# Patient Record
Sex: MALE | Race: WHITE | NOT HISPANIC OR LATINO | Employment: OTHER | URBAN - METROPOLITAN AREA
[De-identification: names, ages, dates, MRNs, and addresses within clinical notes are randomized per-mention and may not be internally consistent; named-entity substitution may affect disease eponyms.]

---

## 2017-09-08 ENCOUNTER — ALLSCRIPTS OFFICE VISIT (OUTPATIENT)
Dept: OTHER | Facility: OTHER | Age: 67
End: 2017-09-08

## 2017-09-08 DIAGNOSIS — N40.1 ENLARGED PROSTATE WITH LOWER URINARY TRACT SYMPTOMS (LUTS): ICD-10-CM

## 2017-09-08 LAB
BILIRUB UR QL STRIP: NORMAL
CLARITY UR: NORMAL
COLOR UR: YELLOW
GLUCOSE (HISTORICAL): NORMAL
HGB UR QL STRIP.AUTO: NORMAL
KETONES UR STRIP-MCNC: NORMAL MG/DL
LEUKOCYTE ESTERASE UR QL STRIP: NORMAL
NITRITE UR QL STRIP: POSITIVE
PH UR STRIP.AUTO: 5.5 [PH]
PROT UR STRIP-MCNC: NORMAL MG/DL
SP GR UR STRIP.AUTO: 1.02
UROBILINOGEN UR QL STRIP.AUTO: 0.2

## 2018-01-12 VITALS
SYSTOLIC BLOOD PRESSURE: 114 MMHG | HEIGHT: 68 IN | BODY MASS INDEX: 25.16 KG/M2 | DIASTOLIC BLOOD PRESSURE: 62 MMHG | WEIGHT: 166 LBS

## 2018-02-12 ENCOUNTER — TELEPHONE (OUTPATIENT)
Dept: UROLOGY | Facility: AMBULATORY SURGERY CENTER | Age: 68
End: 2018-02-12

## 2018-02-16 ENCOUNTER — DOCUMENTATION (OUTPATIENT)
Dept: UROLOGY | Facility: MEDICAL CENTER | Age: 68
End: 2018-02-16

## 2018-02-16 ENCOUNTER — TELEPHONE (OUTPATIENT)
Dept: UROLOGY | Facility: MEDICAL CENTER | Age: 68
End: 2018-02-16

## 2018-02-16 NOTE — TELEPHONE ENCOUNTER
LMOM WITH ABNORMAL PSA RESULTS  C/B TO SCHEDULE SOONER APPT OR MAY KEEP PREVIOUSLY SCHEDULED APPT FOR 3/19

## 2018-02-16 NOTE — PROGRESS NOTES
LMOM WITH RECENT PSA RESULTS AND TO C/B TO SCHEDULE SOONER APPT OR HE MAY KEEP PREVIOUSLY SCHEDULED APPT FOR 3/19

## 2018-03-01 RX ORDER — OSELTAMIVIR PHOSPHATE 75 MG/1
CAPSULE ORAL
COMMUNITY
Start: 2018-01-20 | End: 2018-05-30

## 2018-03-01 RX ORDER — PRAVASTATIN SODIUM 20 MG
20 TABLET ORAL DAILY
COMMUNITY

## 2018-03-01 RX ORDER — FLUOROURACIL 50 MG/G
CREAM TOPICAL
COMMUNITY
Start: 2018-01-09 | End: 2018-05-30

## 2018-03-05 ENCOUNTER — OFFICE VISIT (OUTPATIENT)
Dept: UROLOGY | Facility: MEDICAL CENTER | Age: 68
End: 2018-03-05
Payer: COMMERCIAL

## 2018-03-05 VITALS
HEIGHT: 68 IN | BODY MASS INDEX: 25.61 KG/M2 | SYSTOLIC BLOOD PRESSURE: 116 MMHG | DIASTOLIC BLOOD PRESSURE: 70 MMHG | WEIGHT: 169 LBS

## 2018-03-05 DIAGNOSIS — R35.1 BENIGN PROSTATIC HYPERPLASIA WITH NOCTURIA: ICD-10-CM

## 2018-03-05 DIAGNOSIS — N40.1 BENIGN PROSTATIC HYPERPLASIA WITH NOCTURIA: ICD-10-CM

## 2018-03-05 DIAGNOSIS — R97.20 ELEVATED PSA: Primary | ICD-10-CM

## 2018-03-05 PROBLEM — N13.8 BPH WITH URINARY OBSTRUCTION: Status: ACTIVE | Noted: 2017-04-27

## 2018-03-05 PROBLEM — M75.101 TEAR OF RIGHT ROTATOR CUFF: Status: ACTIVE | Noted: 2017-10-20

## 2018-03-05 PROCEDURE — 99214 OFFICE O/P EST MOD 30 MIN: CPT | Performed by: UROLOGY

## 2018-03-05 RX ORDER — TAMSULOSIN HYDROCHLORIDE 0.4 MG/1
0.4 CAPSULE ORAL
COMMUNITY
End: 2018-12-11 | Stop reason: SDUPTHER

## 2018-03-05 NOTE — LETTER
March 5, 2018     Richard Russell DO  [address]    Patient: Feliberto Shepherd   YOB: 1950   Date of Visit: 3/5/2018       Dear Dr Chris Marte: Thank you for referring Feliberto Shepherd to me for evaluation  Below are my notes for this consultation  If you have questions, please do not hesitate to call me  I look forward to following your patient along with you  Sincerely,        Kristin Younger MD        CC: No Recipients  Krisitn Younger MD  3/5/2018 10:17 AM  Sign at close encounter  Assessment/Plan:    Benign prostatic hyperplasia with nocturia  He is voiding adequately with A UA symptom score of 10  He has resumed TAMSULOSIN as he does feel it helps a little  We will continue to follow his voiding pattern  Elevated PSA  Baseline PSA is in the 3 range  His most recent PSA was 15 6 earlier in February  Pathology of his transurethral resection specimen was benign last year  We will check a urinalysis and urine culture and repeat his PSA  Consider biopsy if PSA remains elevated  He will return in 6 weeks  Diagnoses and all orders for this visit:    Elevated PSA  -     PSA, total and free; Future  -     Urine culture; Future    Benign prostatic hyperplasia with nocturia  -     Urinalysis with microscopic; Future    Other orders  -     fluorouracil (EFUDEX) 5 % cream;   -     hydrocortisone 2 5 % ointment;   -     oseltamivir (TAMIFLU) 75 mg capsule;   -     pravastatin (PRAVACHOL) 20 mg tablet; Take by mouth  -     tamsulosin (FLOMAX) 0 4 mg; Take 0 4 mg by mouth daily with dinner          Subjective:      Patient ID: Feliberto Shepherd is a 77 y o  male  64-year-old male who was now approximately 1 year post transurethral resection of the prostate  He notes he is voiding well  His stream is adequate he feels he empties his bladder well  He gets up twice a night to urinate  There is no gross hematuria, dysuria or symptoms of infection  He notes his urine does smell strong  He has resumed taking TAMSULOSIN as he does feel it makes his stream a little bit better  The following portions of the patient's history were reviewed and updated as appropriate: allergies, current medications, past family history, past medical history, past social history, past surgical history and problem list     Review of Systems   Constitutional: Negative for chills, diaphoresis, fatigue and fever  HENT: Negative  Eyes: Negative  Respiratory: Negative  Cardiovascular: Negative  Endocrine: Negative  Musculoskeletal: Negative  Skin: Negative  Allergic/Immunologic: Negative  Neurological: Negative  Hematological: Negative  Psychiatric/Behavioral: Negative  Objective:      /70 (BP Location: Left arm, Patient Position: Sitting)   Ht 5' 8" (1 727 m)   Wt 76 7 kg (169 lb)   BMI 25 70 kg/m²           Physical Exam   Constitutional: He is oriented to person, place, and time  He appears well-developed and well-nourished  HENT:   Head: Normocephalic and atraumatic  Eyes: Conjunctivae are normal    Neck: Neck supple  Cardiovascular: Normal rate  Pulmonary/Chest: Effort normal    Abdominal: Soft  Bowel sounds are normal  He exhibits no distension and no mass  There is no tenderness  There is no rebound, no guarding and no CVA tenderness  Hernia confirmed negative in the right inguinal area and confirmed negative in the left inguinal area  Genitourinary: Rectum normal, testes normal and penis normal  Prostate is enlarged  Prostate is not tender  Right testis shows no mass  Left testis shows no mass  No phimosis or hypospadias  Genitourinary Comments: DEYA:  Normal tone, no mass, prostate moderately enlarged and palpably benign  No tenderness or nodularity  Musculoskeletal: Normal range of motion  He exhibits no edema  Neurological: He is alert and oriented to person, place, and time     Skin: Skin is warm and dry    Psychiatric: He has a normal mood and affect  His behavior is normal  Judgment and thought content normal          Jarek Dominguez MD  3/5/2018  9:49 AM  Sign at close encounter  IPSS Questionnaire (AUA-7): Over the past month    1)  How often have you had a sensation of not emptying your bladder completely after you finish urinating? 1 - Less than 1 time in 5   2)  How often have you had to urinate again less than two hours after you finished urinating? 2 - Less than half the time   3)  How often have you found you stopped and started again several times when you urinated? 1 - Less than 1 time in 5   4) How difficult have you found it to postpone urination? 1 - Less than 1 time in 5   5) How often have you had a weak urinary stream?  1 - Less than 1 time in 5   6) How often have you had to push or strain to begin urination? 1 - Less than 1 time in 5   7) How many times did you most typically get up to urinate from the time you went to bed until the time you got up in the morning?   2 - 2 times   Total Score:  9

## 2018-03-05 NOTE — PATIENT INSTRUCTIONS
Prostate Specific Antigen   WHAT YOU NEED TO KNOW:   What is a prostate specific antigen (PSA) test?  A PSA test is a blood test used to screen men for prostate cancer  A PSA test is also used to monitor how well prostate cancer treatment is working  What other test may be done with a PSA test?  A digital rectal exam is usually performed with a PSA test  Your healthcare provider will insert a gloved finger into your rectum to feel if your prostate is large, firm, or has lumps  Who needs a PSA test?  Some experts recommend a PSA test for men ages 48 to 79  They also recommend testing men with a high risk for prostate cancer at age 36 or 39  Risk factors may include being  or having a brother or father with prostate cancer  Other experts may not recommend PSA testing  Your healthcare provider can help you decide if you need a PSA test    What do the results of a PSA test mean? Most healthy men have a PSA level less than 4 ng/mL  If your PSA level is higher than 4 ng/mL you may need more tests  Examples include blood or urine tests, an ultrasound, MRI, CT scan, or a prostate biopsy  Ask your healthcare provider for more information on these tests  What else can cause a high PSA level? A high PSA level does not always mean you have prostate cancer  Certain conditions or procedures can increase PSA levels  Examples include:  · Older age    · Procedures such as a prostate biopsy or a cystoscopy    · An enlarged prostate    · Recent sexual activity    · A prostate infection    · Certain exercises that put pressure on the prostate such as bicycling    · Medicine such as testosterone  CARE AGREEMENT:   You have the right to help plan your care  Learn about your health condition and how it may be treated  Discuss treatment options with your caregivers to decide what care you want to receive  You always have the right to refuse treatment  The above information is an  only   It is not intended as medical advice for individual conditions or treatments  Talk to your doctor, nurse or pharmacist before following any medical regimen to see if it is safe and effective for you  © 2017 [facility] Information is for End User's use only and may not be sold, redistributed or otherwise used for commercial purposes  All illustrations and images included in CareNotes® are the copyrighted property of A D A M , Inc  or Josiah Lizarraga

## 2018-03-05 NOTE — LETTER
March 5, 2018     Vasquez Murrieta DO  [address]    Patient: Leanna Reed   YOB: 1950   Date of Visit: 3/5/2018       Dear Dr Elle Durham: Thank you for referring Leanna Reed to me for evaluation  Below are my notes for this consultation  If you have questions, please do not hesitate to call me  I look forward to following your patient along with you  Sincerely,        Robson Howard MD        CC: No Recipients  Robson Howard MD  3/5/2018 10:16 AM  Sign at close encounter  Assessment/Plan:    Benign prostatic hyperplasia with nocturia  He is voiding adequately with A UA symptom score of 10  He has resumed TAMSULOSIN as he does feel it helps a little  We will continue to follow his voiding pattern  Elevated PSA  Baseline PSA is in the 3 range  His most recent PSA was 15 6 earlier in February  Pathology of his transurethral resection specimen was benign last year  We will check a urinalysis and urine culture and repeat his PSA  Consider biopsy if PSA remains elevated  He will return in 6 weeks  {Assess/PlanSmartLinks:58329}      Subjective:      Patient ID: Leanna Reed is a 77 y o  male  68-year-old male who was now approximately 1 year post transurethral resection of the prostate  He notes he is voiding well  His stream is adequate he feels he empties his bladder well  He gets up twice a night to urinate  There is no gross hematuria, dysuria or symptoms of infection  He notes his urine does smell strong  He has resumed taking TAMSULOSIN as he does feel it makes his stream a little bit better  {Common ambulatory SmartLinks:96834}    Review of Systems   Constitutional: Negative for chills, diaphoresis, fatigue and fever  HENT: Negative  Eyes: Negative  Respiratory: Negative  Cardiovascular: Negative  Endocrine: Negative  Musculoskeletal: Negative  Skin: Negative      Allergic/Immunologic: Negative  Neurological: Negative  Hematological: Negative  Psychiatric/Behavioral: Negative  Objective:      /70 (BP Location: Left arm, Patient Position: Sitting)   Ht 5' 8" (1 727 m)   Wt 76 7 kg (169 lb)   BMI 25 70 kg/m²           Physical Exam   Constitutional: He is oriented to person, place, and time  He appears well-developed and well-nourished  HENT:   Head: Normocephalic and atraumatic  Eyes: Conjunctivae are normal    Neck: Neck supple  Cardiovascular: Normal rate  Pulmonary/Chest: Effort normal    Abdominal: Soft  Bowel sounds are normal  He exhibits no distension and no mass  There is no tenderness  There is no rebound, no guarding and no CVA tenderness  Hernia confirmed negative in the right inguinal area and confirmed negative in the left inguinal area  Genitourinary: Rectum normal, testes normal and penis normal  Prostate is enlarged  Prostate is not tender  Right testis shows no mass  Left testis shows no mass  No phimosis or hypospadias  Genitourinary Comments: DEYA:  Normal tone, no mass, prostate moderately enlarged and palpably benign  No tenderness or nodularity  Musculoskeletal: Normal range of motion  He exhibits no edema  Neurological: He is alert and oriented to person, place, and time  Skin: Skin is warm and dry  Psychiatric: He has a normal mood and affect  His behavior is normal  Judgment and thought content normal          Jacek Purcell MD  3/5/2018  9:49 AM  Sign at close encounter  IPSS Questionnaire (AUA-7): Over the past month    1)  How often have you had a sensation of not emptying your bladder completely after you finish urinating? 1 - Less than 1 time in 5   2)  How often have you had to urinate again less than two hours after you finished urinating? 2 - Less than half the time   3)  How often have you found you stopped and started again several times when you urinated?   1 - Less than 1 time in 5   4) How difficult have you found it to postpone urination? 1 - Less than 1 time in 5   5) How often have you had a weak urinary stream?  1 - Less than 1 time in 5   6) How often have you had to push or strain to begin urination? 1 - Less than 1 time in 5   7) How many times did you most typically get up to urinate from the time you went to bed until the time you got up in the morning?   2 - 2 times   Total Score:  9

## 2018-03-05 NOTE — ASSESSMENT & PLAN NOTE
He is voiding adequately with A UA symptom score of 10  He has resumed TAMSULOSIN as he does feel it helps a little  We will continue to follow his voiding pattern

## 2018-03-05 NOTE — ASSESSMENT & PLAN NOTE
Baseline PSA is in the 3 range  His most recent PSA was 15 6 earlier in February  Pathology of his transurethral resection specimen was benign last year  We will check a urinalysis and urine culture and repeat his PSA  Consider biopsy if PSA remains elevated  He will return in 6 weeks

## 2018-03-05 NOTE — PROGRESS NOTES
Assessment/Plan:    Benign prostatic hyperplasia with nocturia  He is voiding adequately with A UA symptom score of 10  He has resumed TAMSULOSIN as he does feel it helps a little  We will continue to follow his voiding pattern  Elevated PSA  Baseline PSA is in the 3 range  His most recent PSA was 15 6 earlier in February  Pathology of his transurethral resection specimen was benign last year  We will check a urinalysis and urine culture and repeat his PSA  Consider biopsy if PSA remains elevated  He will return in 6 weeks  Diagnoses and all orders for this visit:    Elevated PSA  -     PSA, total and free; Future  -     Urine culture; Future    Benign prostatic hyperplasia with nocturia  -     Urinalysis with microscopic; Future    Other orders  -     fluorouracil (EFUDEX) 5 % cream;   -     hydrocortisone 2 5 % ointment;   -     oseltamivir (TAMIFLU) 75 mg capsule;   -     pravastatin (PRAVACHOL) 20 mg tablet; Take by mouth  -     tamsulosin (FLOMAX) 0 4 mg; Take 0 4 mg by mouth daily with dinner          Subjective:      Patient ID: Annalee Garcia is a 77 y o  male  28-year-old male who was now approximately 1 year post transurethral resection of the prostate  He notes he is voiding well  His stream is adequate he feels he empties his bladder well  He gets up twice a night to urinate  There is no gross hematuria, dysuria or symptoms of infection  He notes his urine does smell strong  He has resumed taking TAMSULOSIN as he does feel it makes his stream a little bit better  The following portions of the patient's history were reviewed and updated as appropriate: allergies, current medications, past family history, past medical history, past social history, past surgical history and problem list     Review of Systems   Constitutional: Negative for chills, diaphoresis, fatigue and fever  HENT: Negative  Eyes: Negative  Respiratory: Negative  Cardiovascular: Negative  Endocrine: Negative  Musculoskeletal: Negative  Skin: Negative  Allergic/Immunologic: Negative  Neurological: Negative  Hematological: Negative  Psychiatric/Behavioral: Negative  Objective:      /70 (BP Location: Left arm, Patient Position: Sitting)   Ht 5' 8" (1 727 m)   Wt 76 7 kg (169 lb)   BMI 25 70 kg/m²          Physical Exam   Constitutional: He is oriented to person, place, and time  He appears well-developed and well-nourished  HENT:   Head: Normocephalic and atraumatic  Eyes: Conjunctivae are normal    Neck: Neck supple  Cardiovascular: Normal rate  Pulmonary/Chest: Effort normal    Abdominal: Soft  Bowel sounds are normal  He exhibits no distension and no mass  There is no tenderness  There is no rebound, no guarding and no CVA tenderness  Hernia confirmed negative in the right inguinal area and confirmed negative in the left inguinal area  Genitourinary: Rectum normal, testes normal and penis normal  Prostate is enlarged  Prostate is not tender  Right testis shows no mass  Left testis shows no mass  No phimosis or hypospadias  Genitourinary Comments: DEYA:  Normal tone, no mass, prostate moderately enlarged and palpably benign  No tenderness or nodularity  Musculoskeletal: Normal range of motion  He exhibits no edema  Neurological: He is alert and oriented to person, place, and time  Skin: Skin is warm and dry  Psychiatric: He has a normal mood and affect   His behavior is normal  Judgment and thought content normal

## 2018-03-05 NOTE — PROGRESS NOTES
IPSS Questionnaire (AUA-7): Over the past month    1)  How often have you had a sensation of not emptying your bladder completely after you finish urinating? 1 - Less than 1 time in 5   2)  How often have you had to urinate again less than two hours after you finished urinating? 2 - Less than half the time   3)  How often have you found you stopped and started again several times when you urinated? 1 - Less than 1 time in 5   4) How difficult have you found it to postpone urination? 1 - Less than 1 time in 5   5) How often have you had a weak urinary stream?  1 - Less than 1 time in 5   6) How often have you had to push or strain to begin urination? 1 - Less than 1 time in 5   7) How many times did you most typically get up to urinate from the time you went to bed until the time you got up in the morning?   2 - 2 times   Total Score:  9

## 2018-03-06 ENCOUNTER — APPOINTMENT (OUTPATIENT)
Dept: LAB | Age: 68
End: 2018-03-06
Payer: COMMERCIAL

## 2018-03-06 DIAGNOSIS — R35.1 BENIGN PROSTATIC HYPERPLASIA WITH NOCTURIA: ICD-10-CM

## 2018-03-06 DIAGNOSIS — R97.20 ELEVATED PSA: ICD-10-CM

## 2018-03-06 DIAGNOSIS — N40.1 BENIGN PROSTATIC HYPERPLASIA WITH NOCTURIA: ICD-10-CM

## 2018-03-06 LAB
BACTERIA UR QL AUTO: ABNORMAL /HPF
BILIRUB UR QL STRIP: NEGATIVE
CLARITY UR: ABNORMAL
COLOR UR: YELLOW
GLUCOSE UR STRIP-MCNC: NEGATIVE MG/DL
HGB UR QL STRIP.AUTO: ABNORMAL
HYALINE CASTS #/AREA URNS LPF: ABNORMAL /LPF
KETONES UR STRIP-MCNC: NEGATIVE MG/DL
LEUKOCYTE ESTERASE UR QL STRIP: ABNORMAL
NITRITE UR QL STRIP: POSITIVE
NON-SQ EPI CELLS URNS QL MICRO: ABNORMAL /HPF
PH UR STRIP.AUTO: 6 [PH] (ref 4.5–8)
PROT UR STRIP-MCNC: NEGATIVE MG/DL
RBC #/AREA URNS AUTO: ABNORMAL /HPF
SP GR UR STRIP.AUTO: 1.01 (ref 1–1.03)
UROBILINOGEN UR QL STRIP.AUTO: 0.2 E.U./DL
WBC #/AREA URNS AUTO: ABNORMAL /HPF

## 2018-03-06 PROCEDURE — 87077 CULTURE AEROBIC IDENTIFY: CPT

## 2018-03-06 PROCEDURE — 87186 SC STD MICRODIL/AGAR DIL: CPT

## 2018-03-06 PROCEDURE — 81001 URINALYSIS AUTO W/SCOPE: CPT

## 2018-03-06 PROCEDURE — 87086 URINE CULTURE/COLONY COUNT: CPT

## 2018-03-08 ENCOUNTER — TELEPHONE (OUTPATIENT)
Dept: UROLOGY | Facility: AMBULATORY SURGERY CENTER | Age: 68
End: 2018-03-08

## 2018-03-08 DIAGNOSIS — N30.00 ACUTE CYSTITIS WITHOUT HEMATURIA: Primary | ICD-10-CM

## 2018-03-08 LAB — BACTERIA UR CULT: ABNORMAL

## 2018-03-08 RX ORDER — SULFAMETHOXAZOLE AND TRIMETHOPRIM 800; 160 MG/1; MG/1
1 TABLET ORAL EVERY 12 HOURS SCHEDULED
Qty: 14 TABLET | Refills: 0 | Status: SHIPPED | OUTPATIENT
Start: 2018-03-08 | End: 2018-03-18

## 2018-03-08 NOTE — TELEPHONE ENCOUNTER
Spoke with pharmacy, they had questions about the medication directions and quantity of pills  Please give them a call back   Thank you

## 2018-04-16 ENCOUNTER — OFFICE VISIT (OUTPATIENT)
Dept: UROLOGY | Facility: MEDICAL CENTER | Age: 68
End: 2018-04-16
Payer: COMMERCIAL

## 2018-04-16 VITALS
HEIGHT: 68 IN | BODY MASS INDEX: 25.61 KG/M2 | SYSTOLIC BLOOD PRESSURE: 138 MMHG | WEIGHT: 169 LBS | DIASTOLIC BLOOD PRESSURE: 76 MMHG

## 2018-04-16 DIAGNOSIS — N40.1 BPH WITH OBSTRUCTION/LOWER URINARY TRACT SYMPTOMS: ICD-10-CM

## 2018-04-16 DIAGNOSIS — R97.20 ELEVATED PSA: ICD-10-CM

## 2018-04-16 DIAGNOSIS — N13.8 BPH WITH OBSTRUCTION/LOWER URINARY TRACT SYMPTOMS: ICD-10-CM

## 2018-04-16 DIAGNOSIS — N39.0 URINARY TRACT INFECTION WITHOUT HEMATURIA, SITE UNSPECIFIED: Primary | ICD-10-CM

## 2018-04-16 PROBLEM — J11.1 INFLUENZA: Status: ACTIVE | Noted: 2018-01-20

## 2018-04-16 LAB
SL AMB  POCT GLUCOSE, UA: ABNORMAL
SL AMB LEUKOCYTE ESTERASE,UA: ABNORMAL
SL AMB POCT BILIRUBIN,UA: ABNORMAL
SL AMB POCT BLOOD,UA: ABNORMAL
SL AMB POCT CLARITY,UA: ABNORMAL
SL AMB POCT COLOR,UA: ABNORMAL
SL AMB POCT KETONES,UA: ABNORMAL
SL AMB POCT NITRITE,UA: POSITIVE
SL AMB POCT PH,UA: 6
SL AMB POCT SPECIFIC GRAVITY,UA: 1.02
SL AMB POCT URINE PROTEIN: 100
SL AMB POCT UROBILINOGEN: 0.2

## 2018-04-16 PROCEDURE — 87186 SC STD MICRODIL/AGAR DIL: CPT | Performed by: UROLOGY

## 2018-04-16 PROCEDURE — 87086 URINE CULTURE/COLONY COUNT: CPT | Performed by: UROLOGY

## 2018-04-16 PROCEDURE — 87077 CULTURE AEROBIC IDENTIFY: CPT | Performed by: UROLOGY

## 2018-04-16 PROCEDURE — 81003 URINALYSIS AUTO W/O SCOPE: CPT | Performed by: UROLOGY

## 2018-04-16 PROCEDURE — 99214 OFFICE O/P EST MOD 30 MIN: CPT | Performed by: UROLOGY

## 2018-04-16 RX ORDER — CLINDAMYCIN HYDROCHLORIDE 150 MG/1
CAPSULE ORAL
COMMUNITY
Start: 2018-04-10 | End: 2018-05-30

## 2018-04-16 RX ORDER — SULFAMETHOXAZOLE AND TRIMETHOPRIM 800; 160 MG/1; MG/1
1 TABLET ORAL EVERY 12 HOURS SCHEDULED
Qty: 20 TABLET | Refills: 0 | Status: SHIPPED | OUTPATIENT
Start: 2018-04-16 | End: 2018-04-26

## 2018-04-16 NOTE — LETTER
April 16, 2018     Caryn Johnson DO  [address] [facility]  [address]  [id number]    Patient: Abhijit Marie   YOB: 1950   Date of Visit: 4/16/2018       Dear Dr Hong Adorno: Thank you for referring Abhijit Marie to me for evaluation  Below are my notes for this consultation  If you have questions, please do not hesitate to call me  I look forward to following your patient along with you  Sincerely,        Dariela Sandy MD        CC: No Recipients  Dariela Sandy MD  4/16/2018 12:06 PM  Sign at close encounter  Assessment/Plan:    Urinary tract infection without hematuria  He appears to have a recurring urinary infection  A urine culture will be sent  He will be a again started empirically on Bactrim DS 1 p o  b i d  times 10 days  We will assess the upper tracts with a renal ultrasound  We will plan to recheck a urine culture after his treatment has completed and prior to cystoscopy  BPH with obstruction/lower urinary tract symptoms  The patient has resumed TAMSULOSIN  When his urinary tract infection was treated he was pleased with his voiding pattern  He will remain on TAMSULOSIN at this time  We will treat his urinary tract infection and he will return for cystoscopy  Elevated PSA  PSA has improved and is presently 4 34 with 27% free on April 5, 2018  In light of pyuria noted today we will again institute antibiotic therapy and continue to follow his PSA  Diagnoses and all orders for this visit:    Urinary tract infection without hematuria, site unspecified  -     POCT urine dip auto non-scope  -     Urine culture  -     sulfamethoxazole-trimethoprim (BACTRIM DS) 800-160 mg per tablet; Take 1 tablet by mouth every 12 (twelve) hours for 10 days  -     US retroperitoneal complete; Future  -     Cystoscopy; Future  -     Urine culture; Future    BPH with obstruction/lower urinary tract symptoms  -     Cystoscopy;  Future    Elevated PSA    Other orders  - clindamycin (CLEOCIN) 150 mg capsule;           Subjective:      Patient ID: Vielka Ma is a 77 y o  male  [place] male returns to the office for follow-up  He is followed for BPH and a history of elevated PSA  At his last visit he was noted to have a urinary tract infection  He was treated with a 10 day course of Bactrim DS  His voiding symptoms improved after treatment however they recently return  He notes his urine is cloudy and foul-smelling  He is voiding adequately but does have mild dysuria  He has mild bilateral flank pain  There is no fever or chills  He returns today for follow-up  The following portions of the patient's history were reviewed and updated as appropriate: allergies, current medications, past family history, past medical history, past social history, past surgical history and problem list     Review of Systems   Constitutional: Positive for fatigue  Negative for chills, diaphoresis and fever  HENT: Negative  Eyes: Negative  Respiratory: Negative  Cardiovascular: Negative  Endocrine: Negative  Musculoskeletal: Negative  Skin: Negative  Allergic/Immunologic: Negative  Neurological: Negative  Hematological: Negative  Psychiatric/Behavioral: Negative  Objective:      /76 (BP Location: Left arm, Patient Position: Sitting)   Ht 5' 8" (1 727 m)   Wt 76 7 kg (169 lb)   BMI 25 70 kg/m²           Physical Exam   Constitutional: He is oriented to person, place, and time  He appears well-developed and well-nourished  HENT:   Head: Normocephalic and atraumatic  Eyes: Conjunctivae are normal    Neck: Neck supple  Cardiovascular: Normal rate  Pulmonary/Chest: Effort normal    Abdominal: Soft  He exhibits no distension and no mass  There is no tenderness  There is no rebound and no guarding  Neurological: He is alert and oriented to person, place, and time  Skin: Skin is warm and dry     Psychiatric: He has a normal mood and affect  His behavior is normal  Judgment and thought content normal    Vitals reviewed

## 2018-04-16 NOTE — ASSESSMENT & PLAN NOTE
He appears to have a recurring urinary infection  A urine culture will be sent  He will be a again started empirically on Bactrim DS 1 p o  b i d  times 10 days  We will assess the upper tracts with a renal ultrasound  We will plan to recheck a urine culture after his treatment has completed and prior to cystoscopy

## 2018-04-16 NOTE — PROGRESS NOTES
Assessment/Plan:    Urinary tract infection without hematuria  He appears to have a recurring urinary infection  A urine culture will be sent  He will be a again started empirically on Bactrim DS 1 p o  b i d  times 10 days  We will assess the upper tracts with a renal ultrasound  We will plan to recheck a urine culture after his treatment has completed and prior to cystoscopy  BPH with obstruction/lower urinary tract symptoms  The patient has resumed TAMSULOSIN  When his urinary tract infection was treated he was pleased with his voiding pattern  He will remain on TAMSULOSIN at this time  We will treat his urinary tract infection and he will return for cystoscopy  Elevated PSA  PSA has improved and is presently 4 34 with 27% free on April 5, 2018  In light of pyuria noted today we will again institute antibiotic therapy and continue to follow his PSA  Diagnoses and all orders for this visit:    Urinary tract infection without hematuria, site unspecified  -     POCT urine dip auto non-scope  -     Urine culture  -     sulfamethoxazole-trimethoprim (BACTRIM DS) 800-160 mg per tablet; Take 1 tablet by mouth every 12 (twelve) hours for 10 days  -     US retroperitoneal complete; Future  -     Cystoscopy; Future  -     Urine culture; Future    BPH with obstruction/lower urinary tract symptoms  -     Cystoscopy; Future    Elevated PSA    Other orders  -     clindamycin (CLEOCIN) 150 mg capsule;           Subjective:      Patient ID: Juanito Smith is a 77 y o  male  75-year-old male returns to the office for follow-up  He is followed for BPH and a history of elevated PSA  At his last visit he was noted to have a urinary tract infection  He was treated with a 10 day course of Bactrim DS  His voiding symptoms improved after treatment however they recently return  He notes his urine is cloudy and foul-smelling  He is voiding adequately but does have mild dysuria    He has mild bilateral flank pain   There is no fever or chills  He returns today for follow-up  The following portions of the patient's history were reviewed and updated as appropriate: allergies, current medications, past family history, past medical history, past social history, past surgical history and problem list     Review of Systems   Constitutional: Positive for fatigue  Negative for chills, diaphoresis and fever  HENT: Negative  Eyes: Negative  Respiratory: Negative  Cardiovascular: Negative  Endocrine: Negative  Musculoskeletal: Negative  Skin: Negative  Allergic/Immunologic: Negative  Neurological: Negative  Hematological: Negative  Psychiatric/Behavioral: Negative  Objective:      /76 (BP Location: Left arm, Patient Position: Sitting)   Ht 5' 8" (1 727 m)   Wt 76 7 kg (169 lb)   BMI 25 70 kg/m²          Physical Exam   Constitutional: He is oriented to person, place, and time  He appears well-developed and well-nourished  HENT:   Head: Normocephalic and atraumatic  Eyes: Conjunctivae are normal    Neck: Neck supple  Cardiovascular: Normal rate  Pulmonary/Chest: Effort normal    Abdominal: Soft  He exhibits no distension and no mass  There is no tenderness  There is no rebound and no guarding  Neurological: He is alert and oriented to person, place, and time  Skin: Skin is warm and dry  Psychiatric: He has a normal mood and affect  His behavior is normal  Judgment and thought content normal    Vitals reviewed

## 2018-04-16 NOTE — ASSESSMENT & PLAN NOTE
PSA has improved and is presently 4 34 with 27% free on April 5, 2018  In light of pyuria noted today we will again institute antibiotic therapy and continue to follow his PSA

## 2018-04-16 NOTE — PATIENT INSTRUCTIONS
Sulfamethoxazole/Trimethoprim (By mouth)   Sulfamethoxazole (sul-fa-meth-OX-a-zole), Trimethoprim (trye-METH-oh-prim)  Treats or prevents infections  Brand Name(s): Bactrim, Bactrim DS, SMZ-TMP Pediatric, Sulfatrim, Sulfatrim Pediatric   There may be other brand names for this medicine  When This Medicine Should Not Be Used: This medicine is not right for everyone  Do not use it if you had an allergic reaction to trimethoprim, sulfamethoxazole, or any sulfa drug  Do not use this medicine if you are pregnant, if you have anemia caused by low levels of folic acid, or if you have a history of drug-induced thrombocytopenia  How to Use This Medicine:   Liquid, Tablet  · Your doctor will tell you how much medicine to use  Do not use more than directed  · Measure the oral liquid medicine with a marked measuring spoon, oral syringe, or medicine cup  · Drink extra fluids so you will urinate more often and help prevent kidney problems  · Take all of the medicine in your prescription to clear up your infection, even if you feel better after the first few doses  · Missed dose: Take a dose as soon as you remember  If it is almost time for your next dose, wait until then and take a regular dose  Do not take extra medicine to make up for a missed dose  · Store the medicine in a closed container at room temperature, away from heat, moisture, and direct light  Do not freeze the oral liquid  Drugs and Foods to Avoid:   Ask your doctor or pharmacist before using any other medicine, including over-the-counter medicines, vitamins, and herbal products  · Some medicines can affect how this medicine works   Tell your doctor if you also use the following:   ¨ amantadine, cyclosporine, digoxin, indomethacin, memantine, methotrexate, phenytoin, pyrimethamine, or warfarin  ¨ an ACE inhibitor, diabetes medicine (glipizide, glyburide, metformin, pioglitazone, repaglinide, rosiglitazone), a diuretic (water pill, such as hydrochlorothiazide), or a tricyclic antidepressant  Warnings While Using This Medicine:   · It is not safe to take this medicine during pregnancy  It could harm an unborn baby  Tell your doctor right away if you become pregnant  · Tell your doctor if you are breastfeeding, or if you have kidney disease, liver disease, diabetes, malabsorption or malnutrition, folate deficiency, porphyria, thyroid problems, or a history of alcoholism  Tell your doctor if you have asthma or severe allergies, especially if you are allergic to any medicines  It is important for your doctor to know if you have HIV or AIDS, because this medicine might work differently for you  · This medicine may cause a severe allergic reaction  · This medicine may lower the number of platelets in your body, which are necessary for proper blood clotting  This may cause you to bleed or get infections more easily  Talk with your doctor if you have concerns about this  · This medicine can cause diarrhea  Call your doctor if the diarrhea becomes severe, does not stop, or is bloody  Do not take any medicine to stop diarrhea until you have talked to your doctor  Diarrhea can occur 2 months or more after you stop taking this medicine  · Tell any doctor or dentist who treats you that you are using this medicine  This medicine may affect certain medical test results  · Your doctor will do lab tests at regular visits to check on the effects of this medicine  Keep all appointments  · Keep all medicine out of the reach of children  Never share your medicine with anyone    Possible Side Effects While Using This Medicine:   Call your doctor right away if you notice any of these side effects:  · Allergic reaction: Itching or hives, swelling in your face or hands, swelling or tingling in your mouth or throat, chest tightness, trouble breathing  · Blistering, peeling, or red skin rash  · Dark urine or pale stools, nausea, vomiting, loss of appetite, stomach pain, yellow skin or eyes  · Chest pain, cough, or trouble breathing  · Confusion, weakness  · Muscle twitching  · Severe diarrhea, stomach pain, cramps, bloating  · Skin rash, purple spots on your skin, or very pale or yellow skin  · Sore throat, fever, muscle pain  · Uneven heartbeat, numbness or tingling in your hands, feet, or lips  · Unusual bleeding, bruising, or weakness  If you notice these less serious side effects, talk with your doctor:   · Mild nausea, vomiting, or loss of appetite  If you notice other side effects that you think are caused by this medicine, tell your doctor  Call your doctor for medical advice about side effects  You may report side effects to FDA at 1-899-FDA-6138  © 2017 [employer] Information is for End User's use only and may not be sold, redistributed or otherwise used for commercial purposes  The above information is an  only  It is not intended as medical advice for individual conditions or treatments  Talk to your doctor, nurse or pharmacist before following any medical regimen to see if it is safe and effective for you

## 2018-04-16 NOTE — ASSESSMENT & PLAN NOTE
The patient has resumed TAMSULOSIN  When his urinary tract infection was treated he was pleased with his voiding pattern  He will remain on TAMSULOSIN at this time  We will treat his urinary tract infection and he will return for cystoscopy

## 2018-04-19 LAB — BACTERIA UR CULT: ABNORMAL

## 2018-04-19 NOTE — PROGRESS NOTES
Spoke with patient's wife; His urine came back showing infection  He is to continue taking the [facility] as prescribed  She will inform the patient

## 2018-04-19 NOTE — PROGRESS NOTES
Spoke with patient's wife; His urine culture shows infection  Dr Drea Nance would like him to continue on Bactrim as prescribed  She will let the patient know

## 2018-05-03 ENCOUNTER — HOSPITAL ENCOUNTER (OUTPATIENT)
Dept: RADIOLOGY | Facility: HOSPITAL | Age: 68
Discharge: HOME | End: 2018-05-03
Attending: UROLOGY
Payer: COMMERCIAL

## 2018-05-03 DIAGNOSIS — N39.0 URINARY TRACT INFECTION WITHOUT HEMATURIA, SITE UNSPECIFIED: ICD-10-CM

## 2018-05-03 PROCEDURE — 76770 US EXAM ABDO BACK WALL COMP: CPT

## 2018-05-07 ENCOUNTER — TELEPHONE (OUTPATIENT)
Dept: UROLOGY | Facility: AMBULATORY SURGERY CENTER | Age: 68
End: 2018-05-07

## 2018-05-14 ENCOUNTER — APPOINTMENT (OUTPATIENT)
Dept: LAB | Age: 68
End: 2018-05-14
Payer: COMMERCIAL

## 2018-05-14 DIAGNOSIS — N39.0 URINARY TRACT INFECTION WITHOUT HEMATURIA, SITE UNSPECIFIED: ICD-10-CM

## 2018-05-14 PROCEDURE — 87186 SC STD MICRODIL/AGAR DIL: CPT

## 2018-05-14 PROCEDURE — 87086 URINE CULTURE/COLONY COUNT: CPT

## 2018-05-14 PROCEDURE — 87077 CULTURE AEROBIC IDENTIFY: CPT

## 2018-05-16 DIAGNOSIS — N30.00 ACUTE CYSTITIS WITHOUT HEMATURIA: Primary | ICD-10-CM

## 2018-05-16 LAB — BACTERIA UR CULT: ABNORMAL

## 2018-05-16 RX ORDER — SULFAMETHOXAZOLE AND TRIMETHOPRIM 800; 160 MG/1; MG/1
1 TABLET ORAL EVERY 12 HOURS SCHEDULED
Qty: 20 TABLET | Refills: 0 | Status: SHIPPED | OUTPATIENT
Start: 2018-05-16 | End: 2018-05-26

## 2018-05-16 NOTE — PROGRESS NOTES
For positive urine culture  He is undergoing a urologic workup at this time for recurrent urinary tract infections

## 2018-05-22 ENCOUNTER — PROCEDURE VISIT (OUTPATIENT)
Dept: UROLOGY | Facility: MEDICAL CENTER | Age: 68
End: 2018-05-22
Payer: COMMERCIAL

## 2018-05-22 VITALS
WEIGHT: 169 LBS | HEIGHT: 68 IN | BODY MASS INDEX: 25.61 KG/M2 | SYSTOLIC BLOOD PRESSURE: 140 MMHG | DIASTOLIC BLOOD PRESSURE: 82 MMHG

## 2018-05-22 DIAGNOSIS — N32.0 BLADDER NECK CONTRACTURE: ICD-10-CM

## 2018-05-22 DIAGNOSIS — N13.8 BPH WITH OBSTRUCTION/LOWER URINARY TRACT SYMPTOMS: Primary | ICD-10-CM

## 2018-05-22 DIAGNOSIS — N40.1 BPH WITH OBSTRUCTION/LOWER URINARY TRACT SYMPTOMS: Primary | ICD-10-CM

## 2018-05-22 DIAGNOSIS — N13.39 OTHER HYDRONEPHROSIS: ICD-10-CM

## 2018-05-22 PROBLEM — N13.30 HYDRONEPHROSIS: Status: ACTIVE | Noted: 2018-05-22

## 2018-05-22 LAB
SL AMB  POCT GLUCOSE, UA: ABNORMAL
SL AMB LEUKOCYTE ESTERASE,UA: ABNORMAL
SL AMB POCT BILIRUBIN,UA: ABNORMAL
SL AMB POCT BLOOD,UA: ABNORMAL
SL AMB POCT CLARITY,UA: CLEAR
SL AMB POCT COLOR,UA: YELLOW
SL AMB POCT KETONES,UA: ABNORMAL
SL AMB POCT NITRITE,UA: ABNORMAL
SL AMB POCT PH,UA: 7
SL AMB POCT SPECIFIC GRAVITY,UA: 1.02
SL AMB POCT URINE PROTEIN: ABNORMAL
SL AMB POCT UROBILINOGEN: 0.2

## 2018-05-22 PROCEDURE — 81003 URINALYSIS AUTO W/O SCOPE: CPT | Performed by: UROLOGY

## 2018-05-22 PROCEDURE — 99214 OFFICE O/P EST MOD 30 MIN: CPT | Performed by: UROLOGY

## 2018-05-22 PROCEDURE — 52000 CYSTOURETHROSCOPY: CPT | Performed by: UROLOGY

## 2018-05-22 NOTE — ASSESSMENT & PLAN NOTE
This is the likely cause of the patient's recurrent urinary tract infections  The patient underwent transurethral resection of the prostate 1 year ago  He now has a bladder neck contracture  We will plan to proceed with cystoscopy and transurethral incision of the bladder neck  The procedure was described  Risks, benefits and alternatives discussed  Consent form was signed

## 2018-05-22 NOTE — LETTER
May 22, 2018     Theodore Zheng DO  [address] [facility]  [address]  [id number]    Patient: Elina Pemberton   YOB: 1950   Date of Visit: 5/22/2018       Dear Dr Iraj Chralton: Thank you for referring Elina Pemberton to me for evaluation  Below are my notes for this consultation  If you have questions, please do not hesitate to call me  I look forward to following your patient along with you  Sincerely,        Samuel Pabon MD        CC: No Recipients  Samuel Pabon MD  5/22/2018  9:26 AM  Sign at close encounter  Assessment/Plan:    Other hydronephrosis  Right hydronephrosis is noted on ultrasound  Causes were discussed  He does have a history of kidney stones  We will investigate with right retrograde pyelography and possible ureteroscopy  Bladder neck contracture  This is the likely cause of the patient's recurrent urinary tract infections  The patient underwent transurethral resection of the prostate 1 year ago  He now has a bladder neck contracture  We will plan to proceed with cystoscopy and transurethral incision of the bladder neck  The procedure was described  Risks, benefits and alternatives discussed  Consent form was signed  Diagnoses and all orders for this visit:    BPH with obstruction/lower urinary tract symptoms  -     POCT urine dip auto non-scope    Bladder neck contracture    Other hydronephrosis    Other orders  -     Cystoscopy          Subjective:      Patient ID: Elina Pemberton is a 77 y o  male  77-year-old male with recurrent urinary tract infections here today for cystoscopy  He is 1 year post transurethral resection of the prostate  He remains on Flomax  He notes he is voiding well  No fever, chills or voiding symptoms at this time  He feels his stream is adequate          The following portions of the patient's history were reviewed and updated as appropriate: allergies, current medications, past family history, past medical history, past social history, past surgical history and problem list     Review of Systems   Constitutional: Negative for chills, diaphoresis, fatigue and fever  HENT: Negative  Eyes: Negative  Respiratory: Negative  Cardiovascular: Negative  Endocrine: Negative  Musculoskeletal: Negative  Skin: Negative  Allergic/Immunologic: Negative  Neurological: Negative  Hematological: Negative  Psychiatric/Behavioral: Negative  All other systems reviewed and are negative  Objective:      /82 (BP Location: Left arm, Patient Position: Sitting)   Ht 5' 8" (1 727 m)   Wt 76 7 kg (169 lb)   BMI 25 70 kg/m²           Physical Exam   Constitutional: He is oriented to person, place, and time  He appears well-developed and well-nourished  HENT:   Head: Normocephalic and atraumatic  Eyes: Conjunctivae are normal    Neck: Neck supple  Cardiovascular: Normal rate  Pulmonary/Chest: Effort normal    Abdominal: Soft  He exhibits no distension and no mass  There is no tenderness  There is no rebound and no guarding  No hernia   Genitourinary: Penis normal    Genitourinary Comments: Testes descended and normal to palpation   Neurological: He is alert and oriented to person, place, and time  Skin: Skin is warm and dry  Psychiatric: He has a normal mood and affect  His behavior is normal  Judgment and thought content normal    Vitals reviewed          Cystoscopy  Date/Time: 5/22/2018 9:13 AM  Performed by: Ronni Lee  Authorized by: Ronni Lee     Procedure details: cystoscopy    Patient tolerance: Patient tolerated the procedure well with no immediate complications    Additional Procedure Details: Cystoscopy Procedure Note        Pre-operative Diagnosis: Recurrent urinary tract infections    Post-operative Diagnosis:  Bladder neck contracture      Procedure Details   The risks, benefits, complications, treatment options, and expected outcomes were discussed with the patient  The patient concurred with the proposed plan, giving informed consent  Cystoscopy was performed today under local anesthesia, using sterile technique  The patient was placed in the supine position, prepped and draped in the usual sterile fashion  A 15 [place]vian flexible cystoscope  was used to inspect both the urethra and bladder  Findings:  Normal urethra, vera was evident and there is apical prostatic tissue, a bladder neck contracture precludes completion of the cystoscopy  Specimens:  None                          Discussion:  Right hydronephrosis is noted on ultrasound  No stone is seen  I recommended to the patient that we proceed with cystoscopy, transurethral incision of the bladder neck and right retrograde pyelography  Ureteroscopy will be performed if needed to further investigate the right hydro

## 2018-05-22 NOTE — ASSESSMENT & PLAN NOTE
Right hydronephrosis is noted on ultrasound  Causes were discussed  He does have a history of kidney stones  We will investigate with right retrograde pyelography and possible ureteroscopy

## 2018-05-22 NOTE — PROGRESS NOTES
Assessment/Plan:    Other hydronephrosis  Right hydronephrosis is noted on ultrasound  Causes were discussed  He does have a history of kidney stones  We will investigate with right retrograde pyelography and possible ureteroscopy  Bladder neck contracture  This is the likely cause of the patient's recurrent urinary tract infections  The patient underwent transurethral resection of the prostate 1 year ago  He now has a bladder neck contracture  We will plan to proceed with cystoscopy and transurethral incision of the bladder neck  The procedure was described  Risks, benefits and alternatives discussed  Consent form was signed  Diagnoses and all orders for this visit:    BPH with obstruction/lower urinary tract symptoms  -     POCT urine dip auto non-scope    Bladder neck contracture    Other hydronephrosis    Other orders  -     Cystoscopy          Subjective:      Patient ID: Linda Mcmillan is a 77 y o  male  75-year-old male with recurrent urinary tract infections here today for cystoscopy  He is 1 year post transurethral resection of the prostate  He remains on Flomax  He notes he is voiding well  No fever, chills or voiding symptoms at this time  He feels his stream is adequate  The following portions of the patient's history were reviewed and updated as appropriate: allergies, current medications, past family history, past medical history, past social history, past surgical history and problem list     Review of Systems   Constitutional: Negative for chills, diaphoresis, fatigue and fever  HENT: Negative  Eyes: Negative  Respiratory: Negative  Cardiovascular: Negative  Endocrine: Negative  Musculoskeletal: Negative  Skin: Negative  Allergic/Immunologic: Negative  Neurological: Negative  Hematological: Negative  Psychiatric/Behavioral: Negative  All other systems reviewed and are negative          Objective:      /82 (BP Location: Left arm, Patient Position: Sitting)   Ht 5' 8" (1 727 m)   Wt 76 7 kg (169 lb)   BMI 25 70 kg/m²          Physical Exam   Constitutional: He is oriented to person, place, and time  He appears well-developed and well-nourished  HENT:   Head: Normocephalic and atraumatic  Eyes: Conjunctivae are normal    Neck: Neck supple  Cardiovascular: Normal rate  Pulmonary/Chest: Effort normal    Abdominal: Soft  He exhibits no distension and no mass  There is no tenderness  There is no rebound and no guarding  No hernia   Genitourinary: Penis normal    Genitourinary Comments: Testes descended and normal to palpation   Neurological: He is alert and oriented to person, place, and time  Skin: Skin is warm and dry  Psychiatric: He has a normal mood and affect  His behavior is normal  Judgment and thought content normal    Vitals reviewed  Cystoscopy  Date/Time: 5/22/2018 9:13 AM  Performed by: Richar Bowling  Authorized by: [employer]     Procedure details: cystoscopy    Patient tolerance: Patient tolerated the procedure well with no immediate complications    Additional Procedure Details: Cystoscopy Procedure Note        Pre-operative Diagnosis: Recurrent urinary tract infections    Post-operative Diagnosis:  Bladder neck contracture      Procedure Details   The risks, benefits, complications, treatment options, and expected outcomes were discussed with the patient  The patient concurred with the proposed plan, giving informed consent  Cystoscopy was performed today under local anesthesia, using sterile technique  The patient was placed in the supine position, prepped and draped in the usual sterile fashion  A 15 Slovak flexible cystoscope  was used to inspect both the urethra and bladder  Findings:  Normal urethra, vera was evident and there is apical prostatic tissue, a bladder neck contracture precludes completion of the cystoscopy             Specimens:  None                          Discussion: Right hydronephrosis is noted on ultrasound  No stone is seen  I recommended to the patient that we proceed with cystoscopy, transurethral incision of the bladder neck and right retrograde pyelography  Ureteroscopy will be performed if needed to further investigate the right hydro

## 2018-05-22 NOTE — H&P
Assessment/Plan:    Other hydronephrosis  Right hydronephrosis is noted on ultrasound  Causes were discussed  He does have a history of kidney stones  We will investigate with right retrograde pyelography and possible ureteroscopy  Bladder neck contracture  This is the likely cause of the patient's recurrent urinary tract infections  The patient underwent transurethral resection of the prostate 1 year ago  He now has a bladder neck contracture  We will plan to proceed with cystoscopy and transurethral incision of the bladder neck  The procedure was described  Risks, benefits and alternatives discussed  Consent form was signed  Diagnoses and all orders for this visit:    BPH with obstruction/lower urinary tract symptoms  -     POCT urine dip auto non-scope    Bladder neck contracture    Other hydronephrosis    Other orders  -     Cystoscopy          Subjective:      Patient ID: Mandi Becerra is a 77 y o  male  58-year-old male with recurrent urinary tract infections here today for cystoscopy  He is 1 year post transurethral resection of the prostate  He remains on Flomax  He notes he is voiding well  No fever, chills or voiding symptoms at this time  He feels his stream is adequate  The following portions of the patient's history were reviewed and updated as appropriate: allergies, current medications, past family history, past medical history, past social history, past surgical history and problem list     Review of Systems   Constitutional: Negative for chills, diaphoresis, fatigue and fever  HENT: Negative  Eyes: Negative  Respiratory: Negative  Cardiovascular: Negative  Endocrine: Negative  Musculoskeletal: Negative  Skin: Negative  Allergic/Immunologic: Negative  Neurological: Negative  Hematological: Negative  Psychiatric/Behavioral: Negative  All other systems reviewed and are negative          Objective:      /82 (BP Location: Left arm, Patient Position: Sitting)   Ht 5' 8" (1 727 m)   Wt 76 7 kg (169 lb)   BMI 25 70 kg/m²           Physical Exam   Constitutional: He is oriented to person, place, and time  He appears well-developed and well-nourished  HENT:   Head: Normocephalic and atraumatic  Eyes: Conjunctivae are normal    Neck: Neck supple  Cardiovascular: Normal rate  Pulmonary/Chest: Effort normal    Abdominal: Soft  He exhibits no distension and no mass  There is no tenderness  There is no rebound and no guarding  No hernia   Genitourinary: Penis normal    Genitourinary Comments: Testes descended and normal to palpation   Neurological: He is alert and oriented to person, place, and time  Skin: Skin is warm and dry  Psychiatric: He has a normal mood and affect  His behavior is normal  Judgment and thought content normal    Vitals reviewed  Cystoscopy  Date/Time: 5/22/2018 9:13 AM  Performed by: Angelo Nath  Authorized by: Angelo Nath     Procedure details: cystoscopy    Patient tolerance: Patient tolerated the procedure well with no immediate complications    Additional Procedure Details: Cystoscopy Procedure Note        Pre-operative Diagnosis: Recurrent urinary tract infections    Post-operative Diagnosis:  Bladder neck contracture      Procedure Details   The risks, benefits, complications, treatment options, and expected outcomes were discussed with the patient  The patient concurred with the proposed plan, giving informed consent  Cystoscopy was performed today under local anesthesia, using sterile technique  The patient was placed in the supine position, prepped and draped in the usual sterile fashion  A 15 Slovak flexible cystoscope  was used to inspect both the urethra and bladder  Findings:  Normal urethra, vera was evident and there is apical prostatic tissue, a bladder neck contracture precludes completion of the cystoscopy             Specimens:  None                          Discussion: Right hydronephrosis is noted on ultrasound  No stone is seen  I recommended to the patient that we proceed with cystoscopy, transurethral incision of the bladder neck and right retrograde pyelography  Ureteroscopy will be performed if needed to further investigate the right hydro

## 2018-05-22 NOTE — PATIENT INSTRUCTIONS

## 2018-05-29 ENCOUNTER — ANESTHESIA EVENT (OUTPATIENT)
Dept: PERIOP | Facility: HOSPITAL | Age: 68
End: 2018-05-29
Payer: COMMERCIAL

## 2018-05-29 RX ORDER — SODIUM CHLORIDE 9 MG/ML
125 INJECTION, SOLUTION INTRAVENOUS CONTINUOUS
Status: CANCELLED | OUTPATIENT
Start: 2018-06-07

## 2018-05-30 ENCOUNTER — APPOINTMENT (OUTPATIENT)
Dept: PREADMISSION TESTING | Facility: HOSPITAL | Age: 68
End: 2018-05-30
Payer: COMMERCIAL

## 2018-05-30 ENCOUNTER — HOSPITAL ENCOUNTER (OUTPATIENT)
Dept: NON INVASIVE DIAGNOSTICS | Facility: HOSPITAL | Age: 68
Discharge: HOME | End: 2018-05-30
Attending: UROLOGY
Payer: COMMERCIAL

## 2018-05-30 ENCOUNTER — HOSPITAL ENCOUNTER (OUTPATIENT)
Dept: RADIOLOGY | Facility: HOSPITAL | Age: 68
Discharge: HOME | End: 2018-05-30
Attending: UROLOGY
Payer: COMMERCIAL

## 2018-05-30 ENCOUNTER — APPOINTMENT (OUTPATIENT)
Dept: LAB | Facility: HOSPITAL | Age: 68
End: 2018-05-30
Attending: UROLOGY
Payer: COMMERCIAL

## 2018-05-30 DIAGNOSIS — Z01.812 PRE-OPERATIVE LABORATORY EXAMINATION: ICD-10-CM

## 2018-05-30 DIAGNOSIS — N13.30 HYDRONEPHROSIS, UNSPECIFIED HYDRONEPHROSIS TYPE: ICD-10-CM

## 2018-05-30 DIAGNOSIS — N32.0 BLADDER NECK CONTRACTURE: ICD-10-CM

## 2018-05-30 DIAGNOSIS — Z01.810 PRE-OPERATIVE CARDIOVASCULAR EXAMINATION: ICD-10-CM

## 2018-05-30 DIAGNOSIS — Z79.01 LONG TERM (CURRENT) USE OF ANTICOAGULANTS: ICD-10-CM

## 2018-05-30 DIAGNOSIS — R39.89 SUSPECTED UTI: ICD-10-CM

## 2018-05-30 LAB
ALBUMIN SERPL BCP-MCNC: 3.7 G/DL (ref 3.5–5)
ALP SERPL-CCNC: 43 U/L (ref 46–116)
ALT SERPL W P-5'-P-CCNC: 31 U/L (ref 12–78)
ANION GAP SERPL CALCULATED.3IONS-SCNC: 12 MMOL/L (ref 4–13)
APTT PPP: 26 SECONDS (ref 24–36)
AST SERPL W P-5'-P-CCNC: 30 U/L (ref 5–45)
ATRIAL RATE: 68 BPM
BASOPHILS # BLD AUTO: 0.01 THOUSANDS/ΜL (ref 0–0.1)
BASOPHILS NFR BLD AUTO: 0 % (ref 0–1)
BILIRUB SERPL-MCNC: 0.54 MG/DL (ref 0.2–1)
BUN SERPL-MCNC: 27 MG/DL (ref 5–25)
CALCIUM SERPL-MCNC: 9.4 MG/DL (ref 8.3–10.1)
CHLORIDE SERPL-SCNC: 107 MMOL/L (ref 100–108)
CO2 SERPL-SCNC: 25 MMOL/L (ref 21–32)
CREAT SERPL-MCNC: 1.27 MG/DL (ref 0.6–1.3)
EOSINOPHIL # BLD AUTO: 0.07 THOUSAND/ΜL (ref 0–0.61)
EOSINOPHIL NFR BLD AUTO: 2 % (ref 0–6)
ERYTHROCYTE [DISTWIDTH] IN BLOOD BY AUTOMATED COUNT: 15 % (ref 11.6–15.1)
GFR SERPL CREATININE-BSD FRML MDRD: 57 ML/MIN/1.73SQ M
GLUCOSE SERPL-MCNC: 91 MG/DL (ref 65–140)
HCT VFR BLD AUTO: 37 % (ref 36.5–49.3)
HGB BLD-MCNC: 12.2 G/DL (ref 12–17)
INR PPP: 0.96 (ref 0.86–1.17)
LYMPHOCYTES # BLD AUTO: 1.72 THOUSANDS/ΜL (ref 0.6–4.47)
LYMPHOCYTES NFR BLD AUTO: 37 % (ref 14–44)
MCH RBC QN AUTO: 29.7 PG (ref 26.8–34.3)
MCHC RBC AUTO-ENTMCNC: 33 G/DL (ref 31.4–37.4)
MCV RBC AUTO: 90 FL (ref 82–98)
MONOCYTES # BLD AUTO: 0.31 THOUSAND/ΜL (ref 0.17–1.22)
MONOCYTES NFR BLD AUTO: 7 % (ref 4–12)
NEUTROPHILS # BLD AUTO: 2.49 THOUSANDS/ΜL (ref 1.85–7.62)
NEUTS SEG NFR BLD AUTO: 54 % (ref 43–75)
NRBC BLD AUTO-RTO: 0 /100 WBCS
P AXIS: 43 DEGREES
PLATELET # BLD AUTO: 222 THOUSANDS/UL (ref 149–390)
PMV BLD AUTO: 9.7 FL (ref 8.9–12.7)
POTASSIUM SERPL-SCNC: 3.9 MMOL/L (ref 3.5–5.3)
PR INTERVAL: 180 MS
PROT SERPL-MCNC: 7.4 G/DL (ref 6.4–8.2)
PROTHROMBIN TIME: 12.9 SECONDS (ref 11.8–14.2)
QRS AXIS: -13 DEGREES
QRSD INTERVAL: 98 MS
QT INTERVAL: 354 MS
QTC INTERVAL: 376 MS
RBC # BLD AUTO: 4.11 MILLION/UL (ref 3.88–5.62)
SODIUM SERPL-SCNC: 144 MMOL/L (ref 136–145)
T WAVE AXIS: 26 DEGREES
VENTRICULAR RATE: 68 BPM
WBC # BLD AUTO: 4.6 THOUSAND/UL (ref 4.31–10.16)

## 2018-05-30 PROCEDURE — 80053 COMPREHEN METABOLIC PANEL: CPT

## 2018-05-30 PROCEDURE — 93010 ELECTROCARDIOGRAM REPORT: CPT | Performed by: INTERNAL MEDICINE

## 2018-05-30 PROCEDURE — 87086 URINE CULTURE/COLONY COUNT: CPT

## 2018-05-30 PROCEDURE — 36415 COLL VENOUS BLD VENIPUNCTURE: CPT

## 2018-05-30 PROCEDURE — 71046 X-RAY EXAM CHEST 2 VIEWS: CPT

## 2018-05-30 PROCEDURE — 85730 THROMBOPLASTIN TIME PARTIAL: CPT

## 2018-05-30 PROCEDURE — 85610 PROTHROMBIN TIME: CPT

## 2018-05-30 PROCEDURE — 85025 COMPLETE CBC W/AUTO DIFF WBC: CPT

## 2018-05-30 PROCEDURE — 93005 ELECTROCARDIOGRAM TRACING: CPT

## 2018-05-30 NOTE — ANESTHESIA PREPROCEDURE EVALUATION
Review of Systems/Medical History  Patient summary reviewed  Chart reviewed  No history of anesthetic complications     Cardiovascular  Hyperlipidemia,    Pulmonary  Smoker ex-smoker  ,        GI/Hepatic       Kidney stones, Prostatic disorder, benign prostatic hyperplasia       Endo/Other  Negative endo/other ROS      GYN       Hematology   Musculoskeletal  Back pain (DDD) , cervical pain and lumbar pain,   Arthritis     Neurology  Negative neurology ROS      Psychology   Negative psychology ROS              Physical Exam    Airway    Mallampati score: II  TM Distance: >3 FB  Neck ROM: full     Dental       Cardiovascular  Rhythm: regular, Rate: normal, Cardiovascular exam normal    Pulmonary  Pulmonary exam normal     Other Findings        Anesthesia Plan  ASA Score- 2     Anesthesia Type- general with ASA Monitors  Additional Monitors:   Airway Plan:         Plan Factors- Patient instructed to abstain from smoking on day of procedure  Patient did not smoke on day of surgery  Induction- intravenous  Postoperative Plan- Plan for postoperative opioid use  Informed Consent- Anesthetic plan and risks discussed with patient

## 2018-05-30 NOTE — PRE-PROCEDURE INSTRUCTIONS
Pre-Surgery Instructions:   Medication Instructions    pravastatin (PRAVACHOL) 20 mg tablet Instructed patient per Anesthesia Guidelines   tamsulosin (FLOMAX) 0 4 mg Instructed patient per Anesthesia Guidelines  No meds needed am of surgery per anesthesia DR Nicki Garcia

## 2018-05-31 LAB — BACTERIA UR CULT: NORMAL

## 2018-06-07 ENCOUNTER — HOSPITAL ENCOUNTER (OUTPATIENT)
Facility: HOSPITAL | Age: 68
Setting detail: OUTPATIENT SURGERY
Discharge: HOME | End: 2018-06-07
Attending: UROLOGY | Admitting: UROLOGY
Payer: COMMERCIAL

## 2018-06-07 ENCOUNTER — ANESTHESIA (OUTPATIENT)
Dept: PERIOP | Facility: HOSPITAL | Age: 68
End: 2018-06-07
Payer: COMMERCIAL

## 2018-06-07 ENCOUNTER — HOSPITAL ENCOUNTER (OUTPATIENT)
Dept: RADIOLOGY | Facility: HOSPITAL | Age: 68
Setting detail: OUTPATIENT SURGERY
Discharge: HOME | End: 2018-06-07
Payer: COMMERCIAL

## 2018-06-07 VITALS
OXYGEN SATURATION: 97 % | HEART RATE: 85 BPM | WEIGHT: 166.6 LBS | TEMPERATURE: 97.9 F | DIASTOLIC BLOOD PRESSURE: 66 MMHG | BODY MASS INDEX: 25.25 KG/M2 | RESPIRATION RATE: 18 BRPM | SYSTOLIC BLOOD PRESSURE: 132 MMHG | HEIGHT: 68 IN

## 2018-06-07 DIAGNOSIS — N32.0 BLADDER NECK CONTRACTURE: Primary | ICD-10-CM

## 2018-06-07 DIAGNOSIS — N32.0 BLADDER NECK OBSTRUCTION: ICD-10-CM

## 2018-06-07 DIAGNOSIS — N13.2 HYDRONEPHROSIS WITH URINARY OBSTRUCTION DUE TO RENAL CALCULUS: ICD-10-CM

## 2018-06-07 DIAGNOSIS — N13.2 URETERAL STONE WITH HYDRONEPHROSIS: ICD-10-CM

## 2018-06-07 DIAGNOSIS — N13.39 OTHER HYDRONEPHROSIS: ICD-10-CM

## 2018-06-07 PROBLEM — N13.9 BENIGN LOCALIZED HYPERPLASIA OF PROSTATE WITH URINARY OBSTRUCTION AND LOWER URINARY TRACT SYMPTOMS: Status: ACTIVE | Noted: 2017-08-24

## 2018-06-07 PROBLEM — R25.2 CRAMP OF BOTH LOWER EXTREMITIES: Status: ACTIVE | Noted: 2017-05-04

## 2018-06-07 PROBLEM — N40.1 BENIGN LOCALIZED HYPERPLASIA OF PROSTATE WITH URINARY OBSTRUCTION AND LOWER URINARY TRACT SYMPTOMS: Status: ACTIVE | Noted: 2017-08-24

## 2018-06-07 PROCEDURE — 74450 X-RAY URETHRA/BLADDER: CPT

## 2018-06-07 PROCEDURE — 52356 CYSTO/URETERO W/LITHOTRIPSY: CPT | Performed by: UROLOGY

## 2018-06-07 PROCEDURE — C1894 INTRO/SHEATH, NON-LASER: HCPCS | Performed by: UROLOGY

## 2018-06-07 PROCEDURE — C2625 STENT, NON-COR, TEM W/DEL SY: HCPCS | Performed by: UROLOGY

## 2018-06-07 PROCEDURE — C1769 GUIDE WIRE: HCPCS | Performed by: UROLOGY

## 2018-06-07 DEVICE — STENT KWART RETRO INJECT SET 6FR 145CM: Type: IMPLANTABLE DEVICE | Site: BLADDER | Status: FUNCTIONAL

## 2018-06-07 RX ORDER — MORPHINE SULFATE 2 MG/ML
2 INJECTION, SOLUTION INTRAMUSCULAR; INTRAVENOUS
Status: DISCONTINUED | OUTPATIENT
Start: 2018-06-07 | End: 2018-06-07 | Stop reason: HOSPADM

## 2018-06-07 RX ORDER — MEPERIDINE HYDROCHLORIDE 50 MG/ML
12.5 INJECTION INTRAMUSCULAR; INTRAVENOUS; SUBCUTANEOUS
Status: DISCONTINUED | OUTPATIENT
Start: 2018-06-07 | End: 2018-06-07 | Stop reason: HOSPADM

## 2018-06-07 RX ORDER — FENTANYL CITRATE/PF 50 MCG/ML
25 SYRINGE (ML) INJECTION
Status: DISCONTINUED | OUTPATIENT
Start: 2018-06-07 | End: 2018-06-07 | Stop reason: HOSPADM

## 2018-06-07 RX ORDER — SODIUM CHLORIDE 9 MG/ML
INJECTION, SOLUTION INTRAVENOUS AS NEEDED
Status: DISCONTINUED | OUTPATIENT
Start: 2018-06-07 | End: 2018-06-07 | Stop reason: HOSPADM

## 2018-06-07 RX ORDER — OXYCODONE HYDROCHLORIDE 5 MG/1
5 TABLET ORAL EVERY 4 HOURS PRN
Status: DISCONTINUED | OUTPATIENT
Start: 2018-06-07 | End: 2018-06-07 | Stop reason: HOSPADM

## 2018-06-07 RX ORDER — PROPOFOL 10 MG/ML
INJECTION, EMULSION INTRAVENOUS AS NEEDED
Status: DISCONTINUED | OUTPATIENT
Start: 2018-06-07 | End: 2018-06-07 | Stop reason: SURG

## 2018-06-07 RX ORDER — EPHEDRINE SULFATE 50 MG/ML
INJECTION, SOLUTION INTRAVENOUS AS NEEDED
Status: DISCONTINUED | OUTPATIENT
Start: 2018-06-07 | End: 2018-06-07 | Stop reason: SURG

## 2018-06-07 RX ORDER — ONDANSETRON 2 MG/ML
INJECTION INTRAMUSCULAR; INTRAVENOUS AS NEEDED
Status: DISCONTINUED | OUTPATIENT
Start: 2018-06-07 | End: 2018-06-07 | Stop reason: SURG

## 2018-06-07 RX ORDER — ONDANSETRON 2 MG/ML
4 INJECTION INTRAMUSCULAR; INTRAVENOUS ONCE
Status: DISCONTINUED | OUTPATIENT
Start: 2018-06-07 | End: 2018-06-07 | Stop reason: HOSPADM

## 2018-06-07 RX ORDER — ACETAMINOPHEN 325 MG/1
650 TABLET ORAL EVERY 6 HOURS PRN
Status: DISCONTINUED | OUTPATIENT
Start: 2018-06-07 | End: 2018-06-07 | Stop reason: HOSPADM

## 2018-06-07 RX ORDER — ONDANSETRON 2 MG/ML
4 INJECTION INTRAMUSCULAR; INTRAVENOUS EVERY 6 HOURS PRN
Status: DISCONTINUED | OUTPATIENT
Start: 2018-06-07 | End: 2018-06-07 | Stop reason: HOSPADM

## 2018-06-07 RX ORDER — TAMSULOSIN HYDROCHLORIDE 0.4 MG/1
0.4 CAPSULE ORAL
Status: DISCONTINUED | OUTPATIENT
Start: 2018-06-07 | End: 2018-06-07 | Stop reason: HOSPADM

## 2018-06-07 RX ORDER — LEVOFLOXACIN 5 MG/ML
500 INJECTION, SOLUTION INTRAVENOUS ONCE
Status: COMPLETED | OUTPATIENT
Start: 2018-06-07 | End: 2018-06-07

## 2018-06-07 RX ORDER — LEVOFLOXACIN 500 MG/1
500 TABLET, FILM COATED ORAL EVERY 24 HOURS
Qty: 4 TABLET | Refills: 0 | Status: SHIPPED | OUTPATIENT
Start: 2018-06-07 | End: 2018-06-11

## 2018-06-07 RX ORDER — DEXAMETHASONE SODIUM PHOSPHATE 4 MG/ML
INJECTION, SOLUTION INTRA-ARTICULAR; INTRALESIONAL; INTRAMUSCULAR; INTRAVENOUS; SOFT TISSUE AS NEEDED
Status: DISCONTINUED | OUTPATIENT
Start: 2018-06-07 | End: 2018-06-07 | Stop reason: SURG

## 2018-06-07 RX ORDER — ATROPA BELLADONNA AND OPIUM 16.2; 6 MG/1; MG/1
1 SUPPOSITORY RECTAL EVERY 6 HOURS PRN
Status: DISCONTINUED | OUTPATIENT
Start: 2018-06-07 | End: 2018-06-07 | Stop reason: HOSPADM

## 2018-06-07 RX ORDER — MIDAZOLAM HYDROCHLORIDE 1 MG/ML
INJECTION INTRAMUSCULAR; INTRAVENOUS AS NEEDED
Status: DISCONTINUED | OUTPATIENT
Start: 2018-06-07 | End: 2018-06-07 | Stop reason: SURG

## 2018-06-07 RX ORDER — FENTANYL CITRATE 50 UG/ML
INJECTION, SOLUTION INTRAMUSCULAR; INTRAVENOUS AS NEEDED
Status: DISCONTINUED | OUTPATIENT
Start: 2018-06-07 | End: 2018-06-07 | Stop reason: SURG

## 2018-06-07 RX ORDER — OXYCODONE HYDROCHLORIDE 5 MG/1
5 TABLET ORAL EVERY 6 HOURS PRN
Qty: 20 TABLET | Refills: 0 | Status: SHIPPED | OUTPATIENT
Start: 2018-06-07 | End: 2018-06-17

## 2018-06-07 RX ORDER — SODIUM CHLORIDE 9 MG/ML
125 INJECTION, SOLUTION INTRAVENOUS CONTINUOUS
Status: DISCONTINUED | OUTPATIENT
Start: 2018-06-07 | End: 2018-06-07 | Stop reason: HOSPADM

## 2018-06-07 RX ADMIN — LIDOCAINE HYDROCHLORIDE 100 MG: 20 INJECTION, SOLUTION INTRAVENOUS at 11:50

## 2018-06-07 RX ADMIN — EPHEDRINE SULFATE 5 MG: 50 INJECTION, SOLUTION INTRAMUSCULAR; INTRAVENOUS; SUBCUTANEOUS at 12:02

## 2018-06-07 RX ADMIN — EPHEDRINE SULFATE 5 MG: 50 INJECTION, SOLUTION INTRAMUSCULAR; INTRAVENOUS; SUBCUTANEOUS at 12:51

## 2018-06-07 RX ADMIN — SODIUM CHLORIDE: 0.9 INJECTION, SOLUTION INTRAVENOUS at 12:24

## 2018-06-07 RX ADMIN — FENTANYL CITRATE 25 MCG: 50 INJECTION, SOLUTION INTRAMUSCULAR; INTRAVENOUS at 12:23

## 2018-06-07 RX ADMIN — DEXAMETHASONE SODIUM PHOSPHATE 4 MG: 4 INJECTION, SOLUTION INTRAMUSCULAR; INTRAVENOUS at 11:55

## 2018-06-07 RX ADMIN — MIDAZOLAM 2 MG: 1 INJECTION INTRAMUSCULAR; INTRAVENOUS at 11:40

## 2018-06-07 RX ADMIN — EPHEDRINE SULFATE 5 MG: 50 INJECTION, SOLUTION INTRAMUSCULAR; INTRAVENOUS; SUBCUTANEOUS at 12:09

## 2018-06-07 RX ADMIN — FENTANYL CITRATE 25 MCG: 50 INJECTION, SOLUTION INTRAMUSCULAR; INTRAVENOUS at 12:26

## 2018-06-07 RX ADMIN — LEVOFLOXACIN: 5 INJECTION, SOLUTION INTRAVENOUS at 11:55

## 2018-06-07 RX ADMIN — FENTANYL CITRATE 25 MCG: 50 INJECTION, SOLUTION INTRAMUSCULAR; INTRAVENOUS at 13:11

## 2018-06-07 RX ADMIN — SODIUM CHLORIDE 125 ML/HR: 0.9 INJECTION, SOLUTION INTRAVENOUS at 11:13

## 2018-06-07 RX ADMIN — PROPOFOL 200 MG: 10 INJECTION, EMULSION INTRAVENOUS at 11:50

## 2018-06-07 RX ADMIN — FENTANYL CITRATE 50 MCG: 50 INJECTION, SOLUTION INTRAMUSCULAR; INTRAVENOUS at 11:55

## 2018-06-07 RX ADMIN — ONDANSETRON HYDROCHLORIDE 4 MG: 2 INJECTION, SOLUTION INTRAVENOUS at 11:40

## 2018-06-07 NOTE — INTERVAL H&P NOTE
H&P reviewed  After examining the patient I find no changes in the patients condition since the H&P had been written   Laterality marked- R

## 2018-06-07 NOTE — DISCHARGE INSTR - AVS FIRST PAGE
Use Tylenol as first-line pain medication  Tamsulosin may also help with stent discomfort  Stents are not permanent and do need to be removed to prevent complications  It is normal to have irritated voiding symptoms and blood in the urine with the stent in place  It is also normal to have back pain when urinate  Call for fever, severe pain not improved with oral pain medications or inability to urinate

## 2018-06-07 NOTE — DISCHARGE INSTRUCTIONS
Ureteral Stent Placement   WHAT YOU NEED TO KNOW:   Ureteral stent placement is a procedure to open a blocked or narrow ureter  The ureter is the tube that carries urine from your kidney into your bladder  A stent is a thin hollow plastic tube used to hold your ureter open and allow urine to flow  The stent may stay in for several weeks  DISCHARGE INSTRUCTIONS:   Medicines:   · Pain medicine  may be given to take away or decrease pain  Do not wait until the pain is severe before you take your medicine  · Antibiotics  help prevent infections  Your healthcare provider may prescribe these for you while your stent remains in  · Take your medicine as directed  Contact your healthcare provider if you think your medicine is not helping or if you have side effects  Tell him or her if you are allergic to any medicine  Keep a list of the medicines, vitamins, and herbs you take  Include the amounts, and when and why you take them  Bring the list or the pill bottles to follow-up visits  Carry your medicine list with you in case of an emergency  Follow up with your urologist as directed: You will need regular follow-up visits with your urologist as long as the stent remains in  He will check to make sure the stent is working properly  He may do urine cultures to check for infection  Write down your questions so you remember to ask them during your visits  Self-care:   · Drink liquids  as directed  Ask your healthcare provider how much liquid to drink each day and which liquids are best for you  Fluids such as cranberry or apple juice may be especially helpful to prevent urinary infections  · Return to normal activities  the day after your stent placement or as directed by your healthcare provider  · You may take a shower  the day after your stent placement if your healthcare provider says it is okay  Contact your healthcare provider or urologist if:   · You have a fever or chills      · You feel like you need to urinate often  · You have pain when you urinate or pain around your bladder or kidney  · You see blood in your urine or it looks cloudy  · You have questions or concerns about your condition or care  Seek care immediately or call 911 if:   · You urinate little or not at all  · You have severe pain in your abdomen  © 2017 [address] Information is for End User's use only and may not be sold, redistributed or otherwise used for commercial purposes  All illustrations and images included in CareNotes® are the copyrighted property of [employer]  or [address]  The above information is an  only  It is not intended as medical advice for individual conditions or treatments  Talk to your doctor, nurse or pharmacist before following any medical regimen to see if it is safe and effective for you  How to Strain Your Urine   WHAT YOU NEED TO KNOW:   Urinate into a strainer (funnel with a fine mesh on the bottom) or glass jar to collect kidney stones  DISCHARGE INSTRUCTIONS:   Medicines:   · Pain medicine: You may be given medicine to take away or decrease pain  Do not wait until the pain is severe before you take your medicine  · NSAIDs:  These medicines decrease swelling, pain, and fever  NSAIDs are available without a doctor's order  Ask which medicine is right for you  Ask how much to take and when to take it  Take as directed  NSAIDs can cause stomach bleeding and kidney problems if not taken correctly  · Nausea medicine: This medicine calms your stomach and prevents or controls vomiting  · Take your medicine as directed  Contact your healthcare provider if you think your medicine is not helping or if you have side effects  Tell him of her if you are allergic to any medicine  Keep a list of the medicines, vitamins, and herbs you take  Include the amounts, and when and why you take them   Bring the list or the pill bottles to follow-up visits  Carry your medicine list with you in case of an emergency  Drink liquids as directed:  Drink about 3 liters of liquids each day, or as directed  That equals about 12 glasses of water or fruit juice  Half of your total daily liquids should be water  Limit coffee, tea, and soda to 2 cups daily  Your urine will be pale and clear if you are drinking enough liquid  Self-care:   · Activity:  Exercise, such as walking, may help decrease your pain  · Avoid heat:  Heat may cause you to sweat, urinate less, and become dehydrated  Follow up with your healthcare provider or urologist as directed:  Write down your questions so you remember to ask them during your visits  Contact your healthcare provider or urologist if:   · You have a fever and chills  · Your urine looks cloudy or has a bad smell  · You have burning pain when you urinate  · You have trouble urinating  · You are vomiting and it does not get better, even after you take medicine  · You have questions or concerns about your condition or care  Return to the emergency department if:   · You are not able to urinate  · You have severe pain in your lower abdomen or side  · Your heart flutters or beats faster than usual   © 2017 [address]  Information is for End User's use only and may not be sold, redistributed or otherwise used for commercial purposes  All illustrations and images included in CareNotes® are the copyrighted property of [employer]  or Josiah Lizarraga  The above information is an  only  It is not intended as medical advice for individual conditions or treatments  Talk to your doctor, nurse or pharmacist before following any medical regimen to see if it is safe and effective for you  Levofloxacin (By mouth)   Levofloxacin (eln-qas-OVJN-a-sin)  Treats infections  This medicine is a quinolone antibiotic     Brand Name(s): Levaquin   There may be other brand names for this medicine  When This Medicine Should Not Be Used: This medicine is not right for everyone  Do not use it if you had an allergic reaction to levofloxacin or to similar medicines  How to Use This Medicine:   Liquid, Tablet  · Your doctor will tell you how much medicine to use  Do not use more than directed  Take your medicine at the same time each day  · Tablet: Take it with or without food  · Liquid: Take it 1 hour before or 2 hours after you eat  Measure the oral liquid medicine with a marked measuring spoon, oral syringe, or medicine cup  · Take all of the medicine in your prescription to clear up your infection, even if you feel better after the first few doses  · Drink extra fluids so you will urinate more often and help prevent kidney problems  · This medicine should come with a Medication Guide  Ask your pharmacist for a copy if you do not have one  · Missed dose: Take a dose as soon as you remember  If it is almost time for your next dose, wait until then and take a regular dose  Do not take extra medicine to make up for a missed dose  · Store the medicine in a closed container at room temperature, away from heat, moisture, and direct light  Drugs and Foods to Avoid:   Ask your doctor or pharmacist before using any other medicine, including over-the-counter medicines, vitamins, and herbal products  · Some foods and medicines can affect how levofloxacin works   Tell your doctor if you are using any of the following:  ¨ Theophylline  ¨ Blood thinner (including warfarin)  ¨ Diabetes medicine  ¨ Medicine for heart rhythm problems (including amiodarone, procainamide, quinidine, sotalol)  ¨ NSAID pain or arthritis medicine (including aspirin, celecoxib, diclofenac, ibuprofen, naproxen)  ¨ Steroid medicine (including hydrocortisone, methylprednisolone, prednisone)  · Take levofloxacin at least 2 hours before or 2 hours after you take antacids that contain magnesium or aluminum, zinc, or iron supplements, sucralfate, or didanosine  Warnings While Using This Medicine:   · Tell your doctor if you are pregnant or breastfeeding, or if you have kidney disease, liver disease, diabetes, heart disease, myasthenia gravis, or a history of heart rhythm problems (such as QT prolongation) or seizures  Tell your doctor if you have ever had tendon or joint problems, including rheumatoid arthritis, or if you have received a transplant  · This medicine may cause the following problems:  ¨ Tendinitis and tendon rupture (may happen after treatment ends)  ¨ Liver damage  ¨ Nerve damage in the arms or legs  ¨ Heart rhythm changes  ¨ Changes in blood sugar levels  · This medicine may make you feel dizzy or lightheaded  Do not drive or do anything else that could be dangerous until you know how this medicine affects you  · This medicine can cause diarrhea  Call your doctor if the diarrhea becomes severe, does not stop, or is bloody  Do not take any medicine to stop diarrhea until you have talked to your doctor  Diarrhea can occur 2 months or more after you stop taking this medicine  · Tell any doctor or dentist who treats you that you are using this medicine  This medicine may affect certain medical test results  · This medicine may make your skin more sensitive to sunlight  Wear sunscreen  Do not use sunlamps or tanning beds  · Call your doctor if your symptoms do not improve or if they get worse  · Keep all medicine out of the reach of children  Never share your medicine with anyone    Possible Side Effects While Using This Medicine:   Call your doctor right away if you notice any of these side effects:  · Allergic reaction: Itching or hives, swelling in your face or hands, swelling or tingling in your mouth or throat, chest tightness, trouble breathing  · Blistering, peeling, red skin rash  · Change in how much or how often you urinate  · Dark urine or pale stools, nausea, vomiting, loss of appetite, stomach pain, yellow skin or eyes  · Diarrhea that may contain blood  · Fainting, dizziness, or lightheadedness  · Fast, slow, or uneven heartbeat, chest pain  · Numbness, tingling, or burning pain in your hands, arms, legs, or feet  · Pain, stiffness, swelling, or bruises around your ankle, leg, shoulder, or other joint  · Seizures, severe headache, unusual thoughts or behaviors, trouble sleeping, feeling anxious, confused, or depressed, seeing, hearing, or feeling things that are not there  · Unusual bleeding, bruising, or weakness  If you notice these less serious side effects, talk with your doctor:   · Mild headache or nausea  If you notice other side effects that you think are caused by this medicine, tell your doctor  Call your doctor for medical advice about side effects  You may report side effects to FDA at 8-929-FDA-5373  © 2017 [address] Information is for End User's use only and may not be sold, redistributed or otherwise used for commercial purposes  The above information is an  only  It is not intended as medical advice for individual conditions or treatments  Talk to your doctor, nurse or pharmacist before following any medical regimen to see if it is safe and effective for you  Oxycodone, Rapid Release (By mouth)   Oxycodone Hydrochloride (ph-g-RIT-done joan-droe-KLOR-valery)  Treats moderate to severe pain  This medicine is a narcotic pain reliever  Brand Name(s): Oxaydo, Oxy IR, Roxicodone   There may be other brand names for this medicine  When This Medicine Should Not Be Used: This medicine is not right for everyone  Do not use it if you had an allergic reaction to oxycodone, codeine, hydrocodone, dihydrocodeine, or morphine, or you have a stomach or bowel blockage  How to Use This Medicine:   Capsule, Liquid, Tablet  · Take your medicine as directed  Your dose may need to be changed several times to find what works best for you  · An overdose can be dangerous  Follow directions carefully so you do not get too much medicine at one time  · Oral liquid: Measure the oral liquid medicine with a marked measuring spoon, oral syringe, or medicine cup  · Oxaydo® tablet: Swallow it whole with enough water to swallow it completely  Do not break, crush, chew, or dissolve it  Do not wet the tablet before you put it in your mouth  · This medicine should come with a Medication Guide  Ask your pharmacist for a copy if you do not have one  · Missed dose: Take a dose as soon as you remember  If it is almost time for your next dose, wait until then and take a regular dose  Do not take extra medicine to make up for a missed dose  · Store the medicine in a closed container at room temperature, away from heat, moisture, and direct light  Store the medicine in a secure place to prevent others from getting it  Ask your pharmacist about the best way to dispose of medicine you do not use  Drugs and Foods to Avoid:   Ask your doctor or pharmacist before using any other medicine, including over-the-counter medicines, vitamins, and herbal products  · Do not use this medicine if you are using or have used an MAO inhibitor within the past 14 days  · Some medicines can affect how oxycodone works  Tell your doctor if you are using any of the following:  ¨ Amiodarone, carbamazepine, erythromycin, ketoconazole, phenytoin, quinidine, rifampin, ritonavir  ¨ Diuretic (water pill)  ¨ Medicine to treat depression or anxiety  ¨ Medicine to treat migraine headaches  ¨ Phenothiazine medicine  · Tell your doctor if you use anything else that makes you sleepy  Some examples are allergy medicine, narcotic pain medicine, and alcohol  Tell your doctor if you are using buprenorphine, butorphanol, nalbuphine, pentazocine, or a muscle relaxer  · Do not drink alcohol while you are using this medicine    Warnings While Using This Medicine:   · Tell your doctor if you are pregnant or breastfeeding, or if you have kidney disease, liver disease, heart disease, low blood pressure, lung disease or breathing problems (such as asthma, COPD), scoliosis, an enlarged prostate or trouble urinating, an underactive thyroid, [place] disease, gallbladder or pancreas problems, or digestion problems  Tell your doctor if you have a history of head injury, brain tumor, mental health problems, seizures, or alcohol or drug addiction  · This medicine may cause the following problems:  ¨ High risk of overdose, which can lead to death  ¨ Respiratory depression (serious breathing problem that can be life-threatening)  ¨ Serotonin syndrome, when used with certain medicines  · This medicine may make you dizzy, drowsy, or faint  Do not drive or do anything else that could be dangerous until you know how this medicine affects you  Sit or lie down if you feel dizzy  Stand up carefully  · This medicine can be habit-forming  Do not use more than your prescribed dose  Call your doctor if you think your medicine is not working  · Do not stop using this medicine suddenly  Your doctor will need to slowly decrease your dose before you stop it completely  · This medicine may cause constipation, especially with long-term use  Ask your doctor if you should use a laxative to prevent and treat constipation  Drink plenty of liquids to help avoid constipation  · This medicine could cause infertility  Talk with your doctor before using this medicine if you plan to have children  · Keep all medicine out of the reach of children  Never share your medicine with anyone    Possible Side Effects While Using This Medicine:   Call your doctor right away if you notice any of these side effects:  · Allergic reaction: Itching or hives, swelling in your face or hands, swelling or tingling in your mouth or throat, chest tightness, trouble breathing  · Anxiety, restlessness, fast heartbeat, fever, sweating, muscle spasms, twitching, nausea, vomiting, diarrhea, seeing or hearing things that are not there  · Blue lips, fingernails, or skin, trouble breathing  · Extreme dizziness or weakness, shallow breathing, slow heartbeat, sweating, cold or clammy skin, seizures  · Lightheadedness, dizziness, fainting  · Severe constipation, stomach pain  If you notice these less serious side effects, talk with your doctor:   · Mild constipation  · Sleepiness, tiredness  If you notice other side effects that you think are caused by this medicine, tell your doctor  Call your doctor for medical advice about side effects  You may report side effects to FDA at 7-228-FDA-1073  © 2017 [address] [employer] Information is for End User's use only and may not be sold, redistributed or otherwise used for commercial purposes  The above information is an  only  It is not intended as medical advice for individual conditions or treatments  Talk to your doctor, nurse or pharmacist before following any medical regimen to see if it is safe and effective for you  Lithotripsy   WHAT YOU NEED TO KNOW:   Lithotripsy is a procedure that uses sound waves to break up stones in the kidney, ureter, or bladder  The stone pieces then pass out of your body through your urine  You may have blood in your urine for 1 to 2 days after the procedure  You may also have bruising and discomfort in your back or abdomen  You may have pain whenever you pass pieces of your kidney stone  This may happen over a few weeks  DISCHARGE INSTRUCTIONS:   Call 911 for any of the following:   · You have chest pain  · You have trouble thinking clearly  · You have severe lower back pain  Seek care immediately if:   · You urinate bright red blood  · You have severe vomiting  · You cannot urinate  Contact your healthcare provider if:   · You have a fever and chills  · You feel burning when you urinate  · You feel the urge to urinate often and immediately      · You have questions or concerns about your condition or care  Medicines:   · Medicines  can help decrease pain or prevent an infection  Medicines may also help you pass the stones or prevent more stones from forming  · Take your medicine as directed  Contact your healthcare provider if you think your medicine is not helping or if you have side effects  Tell him or her if you are allergic to any medicine  Keep a list of the medicines, vitamins, and herbs you take  Include the amounts, and when and why you take them  Bring the list or the pill bottles to follow-up visits  Carry your medicine list with you in case of an emergency  Self-care:   · Strain your urine every time you go to the bathroom  Urinate through a strainer or a piece of thin cloth to catch the stones  Take the pieces to your follow-up visits  · If you have a stent, do not pull on it  This can cause pain and bleeding  · Apply heat  on your lower back for 20 to 30 minutes every 2 hours for as many days as directed  Heat helps decrease pain and muscle spasms  · Drink liquids as directed  You may need to drink more liquid than usual  This will help flush any remaining small pieces of stone  Liquids can also help prevent more stones from forming  Ask how much liquid to drink each day and which liquids are best for you  Do not drink caffeine  · Rest  when you feel it is needed  Slowly start to do more each day  · Eat a variety of healthy foods  Ask if you need to make changes to the foods you eat  Healthy foods include fruits, vegetables, whole-grain breads, low-fat dairy products, beans, and fish  You may need to limit nuts, chocolate, coffee, and certain green leafy vegetables  You may also need to limit meat and salt  Follow up with your healthcare provider as directed: You may need to return to have your stent removed  Write down your questions so you remember to ask them during your visits     © 2017 [employer] Information is for End User's use only and may not be sold, redistributed or otherwise used for commercial purposes  All illustrations and images included in CareNotes® are the copyrighted property of [employer]  or Josiah Lizarraga  The above information is an  only  It is not intended as medical advice for individual conditions or treatments  Talk to your doctor, nurse or pharmacist before following any medical regimen to see if it is safe and effective for you

## 2018-06-07 NOTE — H&P (VIEW-ONLY)
Assessment/Plan:    Other hydronephrosis  Right hydronephrosis is noted on ultrasound  Causes were discussed  He does have a history of kidney stones  We will investigate with right retrograde pyelography and possible ureteroscopy  Bladder neck contracture  This is the likely cause of the patient's recurrent urinary tract infections  The patient underwent transurethral resection of the prostate 1 year ago  He now has a bladder neck contracture  We will plan to proceed with cystoscopy and transurethral incision of the bladder neck  The procedure was described  Risks, benefits and alternatives discussed  Consent form was signed  Diagnoses and all orders for this visit:    BPH with obstruction/lower urinary tract symptoms  -     POCT urine dip auto non-scope    Bladder neck contracture    Other hydronephrosis    Other orders  -     Cystoscopy          Subjective:      Patient ID: Jorge Luis Bowman is a 77 y o  male  68-year-old male with recurrent urinary tract infections here today for cystoscopy  He is 1 year post transurethral resection of the prostate  He remains on Flomax  He notes he is voiding well  No fever, chills or voiding symptoms at this time  He feels his stream is adequate  The following portions of the patient's history were reviewed and updated as appropriate: allergies, current medications, past family history, past medical history, past social history, past surgical history and problem list     Review of Systems   Constitutional: Negative for chills, diaphoresis, fatigue and fever  HENT: Negative  Eyes: Negative  Respiratory: Negative  Cardiovascular: Negative  Endocrine: Negative  Musculoskeletal: Negative  Skin: Negative  Allergic/Immunologic: Negative  Neurological: Negative  Hematological: Negative  Psychiatric/Behavioral: Negative  All other systems reviewed and are negative          Objective:      /82 (BP Location: Left arm, Patient Position: Sitting)   Ht 5' 8" (1 727 m)   Wt 76 7 kg (169 lb)   BMI 25 70 kg/m²           Physical Exam   Constitutional: He is oriented to person, place, and time  He appears well-developed and well-nourished  HENT:   Head: Normocephalic and atraumatic  Eyes: Conjunctivae are normal    Neck: Neck supple  Cardiovascular: Normal rate  Pulmonary/Chest: Effort normal    Abdominal: Soft  He exhibits no distension and no mass  There is no tenderness  There is no rebound and no guarding  No hernia   Genitourinary: Penis normal    Genitourinary Comments: Testes descended and normal to palpation   Neurological: He is alert and oriented to person, place, and time  Skin: Skin is warm and dry  Psychiatric: He has a normal mood and affect  His behavior is normal  Judgment and thought content normal    Vitals reviewed  Cystoscopy  Date/Time: 5/22/2018 9:13 AM  Performed by: Ronni Lee  Authorized by: Ronni Lee     Procedure details: cystoscopy    Patient tolerance: Patient tolerated the procedure well with no immediate complications    Additional Procedure Details: Cystoscopy Procedure Note        Pre-operative Diagnosis: Recurrent urinary tract infections    Post-operative Diagnosis:  Bladder neck contracture      Procedure Details   The risks, benefits, complications, treatment options, and expected outcomes were discussed with the patient  The patient concurred with the proposed plan, giving informed consent  Cystoscopy was performed today under local anesthesia, using sterile technique  The patient was placed in the supine position, prepped and draped in the usual sterile fashion  A 15 Polish flexible cystoscope  was used to inspect both the urethra and bladder  Findings:  Normal urethra, vera was evident and there is apical prostatic tissue, a bladder neck contracture precludes completion of the cystoscopy             Specimens:  None                          Discussion: Right hydronephrosis is noted on ultrasound  No stone is seen  I recommended to the patient that we proceed with cystoscopy, transurethral incision of the bladder neck and right retrograde pyelography  Ureteroscopy will be performed if needed to further investigate the right hydro

## 2018-06-07 NOTE — DISCHARGE SUMMARY
DISCHARGE SUMMARY     Patient Name: Chuyita Guardado    Patient MRN: [id number]    Admitting Provider: Amari Steele MD    Discharging Provider: Amari Steele MD    Primary Care Physician at Discharge: Alexandria Vo, [facility]     Admission Date: 6/7/2018     Discharge Date: 6/7/2018    Admission Diagnosis   Bladder neck contracture [N32 0]  Hydronephrosis, unspecified hydronephrosis type [N13 30]    Discharge Diagnoses  Active Problems:    Bladder neck contracture    Ureteral stone with hydronephrosis      Medications  Current Discharge Medication List      START taking these medications    Details   levofloxacin (LEVAQUIN) 500 mg tablet Take 1 tablet (500 mg total) by mouth every 24 hours for 4 days  Qty: 4 tablet, Refills: 0    Associated Diagnoses: Hydronephrosis with urinary obstruction due to renal calculus      oxyCODONE (ROXICODONE) 5 mg immediate release tablet Take 1 tablet (5 mg total) by mouth every 6 (six) hours as needed for moderate pain for up to 10 days Max Daily Amount: 20 mg  Qty: 20 tablet, Refills: 0    Associated Diagnoses: Bladder neck contracture; Other hydronephrosis            Current Discharge Medication List      CONTINUE these medications which have NOT CHANGED    Details   pravastatin (PRAVACHOL) 20 mg tablet Take 20 mg by mouth daily        tamsulosin (FLOMAX) 0 4 mg Take 0 4 mg by mouth daily with dinner             Allergies  Allergies   Allergen Reactions    Penicillins Hives       Outpatient Follow-Up  Amari Steele MD  [facility]  [place] [address]  [phone]    Schedule an appointment as soon as possible for a visit in 2 week(s)  For cysto,  stent removal   Please get x-ray prior to the removal so we can ensure stent removal is indicated  ? Discharge Disposition  Home    Operative Procedures Performed  Procedure(s):  CYSTOSCOPY, RIGHT RETROGRADE PYELOGRAM, RIGHT URETEROSCOPY, LASER LITHOTRIPSY, STENT INSERTION  ?   Physical Exam at Discharge  Condition of Patient on Discharge: stable  ?   Krystal Flowers MD

## 2018-06-07 NOTE — ANESTHESIA POSTPROCEDURE EVALUATION
Post-Op Assessment Note      CV Status:  Stable    Mental Status:  Alert and awake    Hydration Status:  Euvolemic    PONV Controlled:  Controlled    Airway Patency:  Patent    Post Op Vitals Reviewed: Yes          Staff: Anesthesiologist           /73 (06/07/18 1434)    Temp      Pulse 89 (06/07/18 1434)   Resp 15 (06/07/18 1434)    SpO2 95 % (06/07/18 1434)

## 2018-06-07 NOTE — OP NOTE
OPERATIVE REPORT  PATIENT NAME: Tyesha Recinos    :  1950  MRN: [id number]  Pt Location: [facility] OR ROOM 02    SURGERY DATE: 2018    Surgeon(s) and Role:     * Clarisse Siemens, MD - Primary    Preop Diagnosis:  Bladder neck contracture [N32 0]  Hydronephrosis, unspecified hydronephrosis type [N13 30]    Post-Op Diagnosis Codes: * Bladder neck contracture [N32 0]     * Hydronephrosis, unspecified hydronephrosis type [N13 30]     * Ureteral calculus, right [N20 1]    Procedure(s) (LRB):  CYSTOSCOPY, RIGHT RETROGRADE PYELOGRAM, RIGHT URETEROSCOPY, LASER LITHOTRIPSY, STENT INSERTION (Right)    Specimen(s):  * No specimens in log *    Estimated Blood Loss:   Minimal    Drains:  Ureteral Drain/Stent Right ureter 6 Fr  (Active)   Site Assessment [place] 2018  3:55 PM   Number of days: 0       Anesthesia Type:   General    Operative Indications:  Bladder neck contracture [N32 0]  Hydronephrosis, unspecified hydronephrosis type [N13 30]      Operative Findings:  Normal urethra with open prostatic fossa and no definite bladder neck contracture  Bladder is unremarkable  Preliminary fluoroscopic images revealed multiple calcifications in the expected course of the right ureter  These were approximately 1 cm in size  These were found to be ureteral calculi on retrograde study  This was confirmed ureteroscopically  There were 3 stones 2 in the mid ureter and 1 in the upper ureter just below the ureteropelvic junction  All 3 stones were treated with the holmium laser  Complications:   None    Procedure and Technique:  Patient was brought to the operating room and properly identified  General anesthesia was administered he was placed in lithotomy position and prepped and draped in the usual sterile fashion  Compression boots were employed  Intravenous antibiotic was administered  An appropriate time-out was performed  Cystourethroscopy was performed with 25 Turkmen cystoscope with findings as above    Next a tiger tail catheter was used along with dilute Omnipaque to perform a right retrograde pyelogram   With confirmation of several large stones causing hydronephrosis as discussed with the patient preoperatively we elected to proceed with ureteroscopy  A 0 035 guidewire was passed up the right collecting system under fluoroscopic guidance and seen to go into the right kidney  The long rigid ureteral scope was then employed  Right ureteroscopy was performed alongside the wire  In the mid ureter the 1st stone was identified  The holmium laser was used to dust the stone into multiple tiny fragments  An additional stone was then in countered approximately 1 cm more proximal   This too was treated with the holmium laser and broken up nicely  Ureteroscopy proceeded to just below the ureteropelvic junction where the largest stone was seen  This was treated with the holmium laser and broken up nicely  Several large fragments were seen to go into the renal pelvis  These could not be reached with the rigid ureteral scope  The rigid ureteral scope was then withdrawn  A long 10-12 Malay ureteral access sheath was then placed over the wire  The flexible ureteral scope was then employed  Systematic pyeloscopy was performed and several large stone fragments were then identified both in lower pole and in the renal pelvis  These were fragmented with the holmium laser on various settings  Once these stones were broken and fragmented pyeloscopy was again performed and no other definite stone seen  At this point the urine was bloody and there were several clots in the collecting system that precluded further identification of any fragments  It was elected to discontinue the procedure at this time  A guidewire was passed through the ureteral scope up into the kidney under direct vision  The ureteral scope and ureteral access sheath were then withdrawn together    Several small ureteral fragments were identified but no large stones  The ureteral access sheath and ureteroscope were then completely withdrawn  A 6 Western Shantelle Kwart stent was then placed in standard fashion over the guidewire  The stent was seen to be well coiled in the renal pelvis and in the bladder after removal of the guidewire  The bladder was drained with the cystoscope  The Dangler of the stent was cut short  The patient tolerated the procedure well  He was taken to the recovery room in satisfactory condition  Blood loss was minimal   We will plan to check a KUB prior to stent removal in approximately 2 weeks    I was present for the entire procedure    Patient Disposition:  PACU     SIGNATURE: Samuel Pabon MD  DATE: June 7, 2018  TIME: 5:09 PM

## 2018-06-07 NOTE — INTERIM OP NOTE
CYSTOSCOPY, RIGHT RETROGRADE PYELOGRAM,Right  URETEROSCOPY with laser lithotripsy, right stent placement  Postoperative Note  PATIENT NAME: Wing Jeter  : 1950  MRN: [id number]  [facility]    Surgery Date: 2018    Preop Diagnosis:  Bladder neck contracture [N32 0]  Hydronephrosis, unspecified hydronephrosis type [N13 30]    Post-Op Diagnosis Codes: * Bladder neck contracture [N32 0]     * Hydronephrosis, unspecified hydronephrosis type [N13 30]     * Ureteral calculus, right [N20 1]    Procedure(s) (LRB):  CYSTOSCOPY, RIGHT RETROGRADE PYELOGRAM,Right  URETEROSCOPY with laser lithotripsy, right stent placement (Right)    Surgeon(s) and Role:     * Dash Castillo MD - Primary    Specimens:  * No specimens in log *    Estimated Blood Loss:   Minimal    Anesthesia Type:   General     Findings:    Bladder neck is open, right retrograde revealed 3 large right ureteral stones  These were causing right hydronephrosis  Stones were located in the mid ureter and just below the right UPJ  Laser lithotripsy of the stones was performed  A right stent was placed    Complications:   None    SIGNATURE: Dash Castillo MD   DATE: 2018   TIME: 1:27 PM

## 2018-06-08 ENCOUNTER — TELEPHONE (OUTPATIENT)
Dept: UROLOGY | Facility: MEDICAL CENTER | Age: 68
End: 2018-06-08

## 2018-06-08 NOTE — TELEPHONE ENCOUNTER
Spoke to wife, pt needs appt in 2 weeks possible stent removal will forward to Dr Krystian Muro re: appt due to his availability

## 2018-06-22 ENCOUNTER — HOSPITAL ENCOUNTER (OUTPATIENT)
Dept: RADIOLOGY | Facility: HOSPITAL | Age: 68
Discharge: HOME | DRG: 872 | End: 2018-06-22
Attending: UROLOGY
Payer: COMMERCIAL

## 2018-06-22 ENCOUNTER — APPOINTMENT (INPATIENT)
Dept: ULTRASOUND IMAGING | Facility: HOSPITAL | Age: 68
DRG: 872 | End: 2018-06-22
Payer: COMMERCIAL

## 2018-06-22 ENCOUNTER — HOSPITAL ENCOUNTER (INPATIENT)
Facility: HOSPITAL | Age: 68
LOS: 2 days | Discharge: HOME | DRG: 872 | End: 2018-06-24
Attending: EMERGENCY MEDICINE | Admitting: INTERNAL MEDICINE
Payer: COMMERCIAL

## 2018-06-22 ENCOUNTER — TELEPHONE (OUTPATIENT)
Dept: UROLOGY | Facility: MEDICAL CENTER | Age: 68
End: 2018-06-22

## 2018-06-22 DIAGNOSIS — A41.9 SEPSIS (HCC): ICD-10-CM

## 2018-06-22 DIAGNOSIS — IMO0002 HISTORY OF RENAL STENT: ICD-10-CM

## 2018-06-22 DIAGNOSIS — N39.0 ACUTE URINARY TRACT INFECTION: Primary | ICD-10-CM

## 2018-06-22 DIAGNOSIS — N13.2 URETERAL STONE WITH HYDRONEPHROSIS: ICD-10-CM

## 2018-06-22 PROBLEM — R79.89 ELEVATED SERUM CREATININE: Status: ACTIVE | Noted: 2018-06-22

## 2018-06-22 LAB
ANION GAP SERPL CALCULATED.3IONS-SCNC: 9 MMOL/L (ref 4–13)
BACTERIA UR QL AUTO: ABNORMAL /HPF
BASOPHILS # BLD AUTO: 0.02 THOUSANDS/ΜL (ref 0–0.1)
BASOPHILS NFR BLD AUTO: 0 % (ref 0–1)
BILIRUB UR QL STRIP: NEGATIVE
BUN SERPL-MCNC: 22 MG/DL (ref 5–25)
CALCIUM SERPL-MCNC: 8.9 MG/DL (ref 8.3–10.1)
CHLORIDE SERPL-SCNC: 104 MMOL/L (ref 100–108)
CLARITY UR: ABNORMAL
CO2 SERPL-SCNC: 26 MMOL/L (ref 21–32)
COLOR UR: YELLOW
COLOR, POC: YELLOW
CREAT SERPL-MCNC: 1.37 MG/DL (ref 0.6–1.3)
EOSINOPHIL # BLD AUTO: 0.01 THOUSAND/ΜL (ref 0–0.61)
EOSINOPHIL NFR BLD AUTO: 0 % (ref 0–6)
ERYTHROCYTE [DISTWIDTH] IN BLOOD BY AUTOMATED COUNT: 14.6 % (ref 11.6–15.1)
GFR SERPL CREATININE-BSD FRML MDRD: 52 ML/MIN/1.73SQ M
GLUCOSE SERPL-MCNC: 119 MG/DL (ref 65–140)
GLUCOSE UR STRIP-MCNC: NEGATIVE MG/DL
HCT VFR BLD AUTO: 36.7 % (ref 36.5–49.3)
HGB BLD-MCNC: 12.5 G/DL (ref 12–17)
HGB UR QL STRIP.AUTO: ABNORMAL
KETONES UR STRIP-MCNC: NEGATIVE MG/DL
LACTATE SERPL-SCNC: 1 MMOL/L (ref 0.5–2)
LEUKOCYTE ESTERASE UR QL STRIP: ABNORMAL
LYMPHOCYTES # BLD AUTO: 1.36 THOUSANDS/ΜL (ref 0.6–4.47)
LYMPHOCYTES NFR BLD AUTO: 7 % (ref 14–44)
MCH RBC QN AUTO: 30.7 PG (ref 26.8–34.3)
MCHC RBC AUTO-ENTMCNC: 34.1 G/DL (ref 31.4–37.4)
MCV RBC AUTO: 90 FL (ref 82–98)
MONOCYTES # BLD AUTO: 1.13 THOUSAND/ΜL (ref 0.17–1.22)
MONOCYTES NFR BLD AUTO: 6 % (ref 4–12)
NEUTROPHILS # BLD AUTO: 17.24 THOUSANDS/ΜL (ref 1.85–7.62)
NEUTS SEG NFR BLD AUTO: 87 % (ref 43–75)
NITRITE UR QL STRIP: POSITIVE
NON-SQ EPI CELLS URNS QL MICRO: ABNORMAL /HPF
NRBC BLD AUTO-RTO: 0 /100 WBCS
PH UR STRIP.AUTO: 6 [PH] (ref 4.5–8)
PLATELET # BLD AUTO: 285 THOUSANDS/UL (ref 149–390)
PMV BLD AUTO: 9.1 FL (ref 8.9–12.7)
POTASSIUM SERPL-SCNC: 3.5 MMOL/L (ref 3.5–5.3)
PROT UR STRIP-MCNC: ABNORMAL MG/DL
RBC # BLD AUTO: 4.07 MILLION/UL (ref 3.88–5.62)
RBC #/AREA URNS AUTO: ABNORMAL /HPF
SODIUM SERPL-SCNC: 139 MMOL/L (ref 136–145)
SP GR UR STRIP.AUTO: 1.02 (ref 1–1.03)
UROBILINOGEN UR QL STRIP.AUTO: 0.2 E.U./DL
WBC # BLD AUTO: 19.76 THOUSAND/UL (ref 4.31–10.16)
WBC #/AREA URNS AUTO: ABNORMAL /HPF

## 2018-06-22 PROCEDURE — 87040 BLOOD CULTURE FOR BACTERIA: CPT | Performed by: EMERGENCY MEDICINE

## 2018-06-22 PROCEDURE — 76770 US EXAM ABDO BACK WALL COMP: CPT

## 2018-06-22 PROCEDURE — 36415 COLL VENOUS BLD VENIPUNCTURE: CPT | Performed by: EMERGENCY MEDICINE

## 2018-06-22 PROCEDURE — 74018 RADEX ABDOMEN 1 VIEW: CPT

## 2018-06-22 PROCEDURE — 83605 ASSAY OF LACTIC ACID: CPT | Performed by: EMERGENCY MEDICINE

## 2018-06-22 PROCEDURE — 93005 ELECTROCARDIOGRAM TRACING: CPT

## 2018-06-22 PROCEDURE — 80048 BASIC METABOLIC PNL TOTAL CA: CPT | Performed by: EMERGENCY MEDICINE

## 2018-06-22 PROCEDURE — 96365 THER/PROPH/DIAG IV INF INIT: CPT

## 2018-06-22 PROCEDURE — 85025 COMPLETE CBC W/AUTO DIFF WBC: CPT | Performed by: EMERGENCY MEDICINE

## 2018-06-22 PROCEDURE — 99223 1ST HOSP IP/OBS HIGH 75: CPT | Performed by: INTERNAL MEDICINE

## 2018-06-22 PROCEDURE — 87186 SC STD MICRODIL/AGAR DIL: CPT

## 2018-06-22 PROCEDURE — 99285 EMERGENCY DEPT VISIT HI MDM: CPT

## 2018-06-22 PROCEDURE — 87086 URINE CULTURE/COLONY COUNT: CPT

## 2018-06-22 PROCEDURE — 81001 URINALYSIS AUTO W/SCOPE: CPT

## 2018-06-22 PROCEDURE — 87077 CULTURE AEROBIC IDENTIFY: CPT

## 2018-06-22 RX ORDER — HEPARIN SODIUM 5000 [USP'U]/ML
5000 INJECTION, SOLUTION INTRAVENOUS; SUBCUTANEOUS EVERY 8 HOURS SCHEDULED
Status: DISCONTINUED | OUTPATIENT
Start: 2018-06-22 | End: 2018-06-24 | Stop reason: HOSPADM

## 2018-06-22 RX ORDER — SODIUM CHLORIDE 9 MG/ML
100 INJECTION, SOLUTION INTRAVENOUS CONTINUOUS
Status: DISCONTINUED | OUTPATIENT
Start: 2018-06-22 | End: 2018-06-24 | Stop reason: HOSPADM

## 2018-06-22 RX ORDER — ACETAMINOPHEN 325 MG/1
650 TABLET ORAL ONCE
Status: COMPLETED | OUTPATIENT
Start: 2018-06-22 | End: 2018-06-22

## 2018-06-22 RX ORDER — ONDANSETRON 2 MG/ML
4 INJECTION INTRAMUSCULAR; INTRAVENOUS EVERY 4 HOURS PRN
Status: DISCONTINUED | OUTPATIENT
Start: 2018-06-22 | End: 2018-06-24 | Stop reason: HOSPADM

## 2018-06-22 RX ORDER — TAMSULOSIN HYDROCHLORIDE 0.4 MG/1
0.4 CAPSULE ORAL
Status: DISCONTINUED | OUTPATIENT
Start: 2018-06-22 | End: 2018-06-24 | Stop reason: HOSPADM

## 2018-06-22 RX ORDER — ACETAMINOPHEN 325 MG/1
650 TABLET ORAL EVERY 6 HOURS PRN
Status: DISCONTINUED | OUTPATIENT
Start: 2018-06-22 | End: 2018-06-24 | Stop reason: HOSPADM

## 2018-06-22 RX ORDER — PRAVASTATIN SODIUM 20 MG
20 TABLET ORAL
Status: DISCONTINUED | OUTPATIENT
Start: 2018-06-22 | End: 2018-06-22

## 2018-06-22 RX ORDER — PRAVASTATIN SODIUM 20 MG
20 TABLET ORAL
Status: DISCONTINUED | OUTPATIENT
Start: 2018-06-22 | End: 2018-06-24 | Stop reason: HOSPADM

## 2018-06-22 RX ADMIN — PRAVASTATIN SODIUM 20 MG: 20 TABLET ORAL at 21:03

## 2018-06-22 RX ADMIN — TAMSULOSIN HYDROCHLORIDE 0.4 MG: 0.4 CAPSULE ORAL at 21:03

## 2018-06-22 RX ADMIN — ACETAMINOPHEN 650 MG: 325 TABLET, FILM COATED ORAL at 23:53

## 2018-06-22 RX ADMIN — CEFTRIAXONE 1000 MG: 1 INJECTION, POWDER, FOR SOLUTION INTRAMUSCULAR; INTRAVENOUS at 12:08

## 2018-06-22 RX ADMIN — ACETAMINOPHEN 650 MG: 325 TABLET, FILM COATED ORAL at 12:01

## 2018-06-22 RX ADMIN — SODIUM CHLORIDE 100 ML/HR: 0.9 INJECTION, SOLUTION INTRAVENOUS at 15:21

## 2018-06-22 RX ADMIN — HEPARIN SODIUM 5000 UNITS: 5000 INJECTION, SOLUTION INTRAVENOUS; SUBCUTANEOUS at 21:03

## 2018-06-22 RX ADMIN — HEPARIN SODIUM 5000 UNITS: 5000 INJECTION, SOLUTION INTRAVENOUS; SUBCUTANEOUS at 15:45

## 2018-06-22 RX ADMIN — SODIUM CHLORIDE 100 ML/HR: 0.9 INJECTION, SOLUTION INTRAVENOUS at 23:55

## 2018-06-22 NOTE — ASSESSMENT & PLAN NOTE
Secondary to UTI  By tachycardia and leukocytosis    Lactic acid within normal limits  Monitor vs and cbc Follow-up culture follow-up blood culture  Antibiotics as above

## 2018-06-22 NOTE — CONSULTS
Consultation - Urology   Patricio Luna 77 y o  male MRN: [id number]  Unit/Bed#: E5 -01 Encounter: [id number]      Assessment/Plan      Assessment:  1  Urinary tract infection  2  History of kidney stones status post right ureteroscopic laser lithotripsy of multiple right ureteral stones on 06/07/2018   3   4 6 x 1 3 cm right renal subcapsular collection-probable hematoma versus abscess  Plan:  1  Check urine culture and sensitivity results and adjust antibiotics as needed  2  If patient does not respond to antibiotics clinically, imaging of the right subcapsular collection should be reimaged and possibly drained  Hopefully this can be avoided as this most likely represents a hematoma rather than abscess  Repeat imaging with either CT or ultrasound is recommended to track either the increasing Sizer decreasing size of this collection over time  Stent removal will be considered while the patient is still on antibiotics  3   Eventual metabolic stone workup for stone diathesis  4  The patient has BPH with obstructive symptoms and a history of a bladder neck contracture after his acute stone episodes resolve consideration towards management of his voiding pattern will take place  History of Present Illness   Attending: Yon Sanchez MD  Reason for Consult / Principal Problem:  Fever and abdominal discomfort  HPI: Patricio Luna is a 77y o  year old male who presents with a history of recent cystoscopy right retrograde pyelography and right ureteroscopic laser lithotripsy for multiple right ureteral stones  The patient also has a history of BPH with obstruction as well as a bladder neck contracture in the past   The patient was followed by Dr Abran Khoury who perform cystoscopy right retrograde pyelography and right ureteroscopic laser lithotripsy on 6 07/2018    The patient had an indwelling right ureteral stent that was to be removed in 3 days but developed dysuria or urgency frequency with may lays noting a temperature spike to 100 6 the night prior to admission  He contacted our office and was referred to the emergency room from where he was admitted  Inpatient consult to Urology  Consult performed by: Loretta Sandoval ordered by: Vernell Juarez          Review of Systems   Constitutional: Positive for activity change, appetite change, chills, diaphoresis, fatigue and fever  HENT: Negative  Eyes: Negative  Respiratory: Negative  Cardiovascular: Negative  Gastrointestinal: Negative  Endocrine: Negative  Genitourinary: Positive for dysuria, flank pain, frequency, hematuria, penile pain and urgency  Allergic/Immunologic: Negative  Neurological: Negative  Hematological: Negative  Psychiatric/Behavioral: Negative          Historical Information   Past Medical History:   Diagnosis Date    Bladder neck contracture     BPH with obstruction/lower urinary tract symptoms     DDD (degenerative disc disease), cervical     DDD (degenerative disc disease), lumbosacral     Elevated PSA     Hydronephrosis     Hyperlipidemia     Kidney stone     in past    Nocturia     Wears glasses      Past Surgical History:   Procedure Laterality Date    BACK SURGERY      lumbar x2    COLONOSCOPY      CYSTOSCOPY      CYSTOSCOPY W/ RETROGRADES      EXTRACORPOREAL SHOCK WAVE LITHOTRIPSY      Renal    INGUINAL HERNIA REPAIR Bilateral     NE CYSTO/URETERO W/LITHOTRIPSY &INDWELL STENT INSRT Right 6/7/2018    Procedure: CYSTOSCOPY, RIGHT RETROGRADE PYELOGRAM, RIGHT URETEROSCOPY, LASER LITHOTRIPSY, STENT INSERTION;  Surgeon: Krystal Flowers MD;  Location: [facility];  Service: Urology    SHOULDER SURGERY Bilateral     rotator cuff    TOTAL HIP ARTHROPLASTY Bilateral     TRANSURETHRAL RESECTION OF PROSTATE       Social History   History   Alcohol Use    Yes     Comment: 4x wk     History   Drug Use No     History   Smoking Status    Former Smoker    Quit date: 5/30/1983 Smokeless Tobacco    Never Used     Family History:   Family History   Problem Relation Age of Onset   [employer] Breast cancer Mother     Cancer Father        Meds/Allergies   all current active meds have been reviewed  Allergies   Allergen Reactions    Penicillins Hives       Objective   Vitals: Blood pressure 125/72, pulse 97, temperature 99 2 °F (37 3 °C), temperature source Temporal, resp  rate 18, height 5' 8" (1 727 m), weight 73 7 kg (162 lb 7 7 oz), SpO2 94 %  I/O last 24 hours: In: 80 [IV Piggyback:90]  Out: -     Invasive Devices     Peripheral Intravenous Line            Peripheral IV 06/22/18 Left Forearm less than 1 day          Drain            Ureteral Drain/Stent Right ureter 6 Fr  15 days                Physical Exam   Constitutional: He is oriented to person, place, and time  He appears well-developed and well-nourished  HENT:   Head: Normocephalic and atraumatic  Eyes: Conjunctivae and EOM are normal    Neck: Neck supple  Cardiovascular: Normal rate, regular rhythm and normal heart sounds  Pulmonary/Chest: Effort normal and breath sounds normal  No respiratory distress  He has no wheezes  He has no rales  He exhibits no tenderness  Abdominal: Bowel sounds are normal  He exhibits no distension  There is tenderness  There is no rebound  Genitourinary: Penis normal    Neurological: He is alert and oriented to person, place, and time  Psychiatric: He has a normal mood and affect  His behavior is normal  Judgment and thought content normal    Nursing note and vitals reviewed  Lab Results: I have personally reviewed pertinent reports      Imaging Studies: I have personally reviewed pertinent films in PACS PACS Images     Show images for US kidney and bladder   Order Report      Order Details   Study Result     RENAL ULTRASOUND     INDICATION:   Right stent and UTI      COMPARISON: 5/3/2018 and abdominal radiographs dated 6/22/2018      TECHNIQUE:   Ultrasound of the retroperitoneum was performed with a curvilinear transducer utilizing volumetric sweeps and still imaging techniques       FINDINGS:     KIDNEYS:  Symmetric and normal size  Right kidney:  10 9 cm x 6 2 cm  Left kidney:  11 3 cm x 6 2 cm      Right kidney  Crescentic subcapsular collection laterally at the right kidney, new from prior  Collection measures 1 3 cm x 4 6 cm  Mild distortion of the cortical contour  No suspicious parenchymal masses  Upper portion of the ureteral stent is visualized at the renal pelvis  No residual hydronephrosis  Scattered echogenic calculi are seen at the upper and lower poles measuring up to 7 mm      Left kidney  Normal echogenicity and contour  9 mm simple cyst at the left mid kidney  No suspicious masses  No hydronephrosis  Technologist reports that left-sided nephroliths are present, but confirmatory images could not be obtained because of patient discomfort  No perinephric fluid collections      URETERS:  Nonvisualized      BLADDER:   Distally right ureteral stent pigtail demonstrated in the bladder  No focal thickening or mass lesions  Bilateral ureteral jets detected  Central defect in the prostate suggests prior TURP      IMPRESSION:     1   4 6 x 1 3 cm right renal subcapsular collection could represent small subcapsular hematoma or abscess      2  Right ureteral stent in place with resolution of previously seen hydronephrosis      3  Right nephrolithiasis    Left nephrolithiasis reported by the technologist, though confirmatory images cannot be obtained      The study was marked in [facility] for immediate notification      Workstation performed: [id number]      Imaging     US kidney and bladder (Order #[id number]) on 6/22/2018 - Imaging     EKG, Pathology, and Other Studies: I have personally reviewed pertinent films in PACS  VTE Prophylaxis: Sequential compression device Siddhartha George     Code Status: Level 1 - Full Code  Advance Directive and Living Will:      Power of :    POLST:      Counseling / Coordination of Care  Total floor / unit time spent today 20 minutes  Greater than 50% of total time was spent with the patient and / or family counseling and / or coordination of care   A description of the counseling / coordination of care:

## 2018-06-22 NOTE — H&P
H&P- Linda Mcmillan 1950, 77 y o  male MRN: [id number]    Unit/Bed#: E5 -01 Encounter: [id number]    Primary Care Provider: Neno Perez DO   Date and time admitted to hospital: 6/22/2018 10:31 AM      History and Physical - [facility] Internal Medicine    Patient Information: Linda Mcmillan 77 y o  male MRN: [id number]  Unit/Bed#: E5 -01 Encounter: [id number]  Admitting Physician: Celso Carlton PA-C  PCP: Neno Perez DO  Date of Admission:  06/22/18    Assessment/Plan:    Hospital Problem List:     Principal Problem:    Complicated UTI (urinary tract infection)  Active Problems:    Sepsis ([facility] [address] )    History of renal stent    Elevated serum creatinine    Benign localized hyperplasia of prostate with urinary obstruction and lower urinary tract symptoms      * Complicated UTI (urinary tract infection)   Assessment & Plan    In the setting of recent stenting for renal stone and lithotripsy  C/b sepsis  rec'd rocephin in ED  Will admit to IP medicine, continue rocephin  Monitor vs and cbc        Sepsis ([facility] [address] )   Assessment & Plan    Secondary to UTI  By tachycardia and leukocytosis    Lactic acid within normal limits  Monitor vs and cbc Follow-up culture follow-up blood culture  Antibiotics as above          Elevated serum creatinine   Assessment & Plan    1 37 today from 1 27 last month unclear a KI verses development of CKD  No recent data preceding save creatinine 0 9 in 2017  Start IVF hydration at 100cc/hr  Check UPC and pvr given hx of bph/bladder neck contracture  F/u bmp in am        History of renal stent   Assessment & Plan    2* recent ureteral calculus w/lithotripsy on 06/7/18 due out on 6/25/18 by urology  Will f/u renal u/s to r/o migrated stent or worsening hydronephrosis given elevated creatinine  May benefit from removal of stent while IP will defer to urology as consult is pending        Benign localized hyperplasia of prostate with urinary obstruction and lower urinary tract symptoms   Assessment & Plan    Continue flomax          ·     VTE Prophylaxis: Heparin  / reason for no mechanical VTE prophylaxis vte screen   Code Status: FC  POLST: There is no POLST form on file for this patient (pre-hospital)    Anticipated Length of Stay:  Patient will be admitted on an Inpatient basis with an anticipated length of stay of  Greater than 2 midnights  Justification for Hospital Stay: sepsis uti    Total Time for Visit, including Counseling / Coordination of Care: 45 minutes  Greater than 50% of this total time spent on direct patient counseling and coordination of care  Chief Complaint:   Dysuria/urgency/fevers/malaise x 2d    History of Present Illness:    Tali Brunner is a 77 y o  male who presents with PMH of BPH, bladder neck contracture, recent right ureteral stone status post lithotripsy and ureteral stent insertion coming to the ER for dysuria frequency fevers and malaise for the last 2 days  Patient reports that he underwent lithotripsy with ureteral stent insertion earlier this month by his report on 6/7/2018  This was done by his urologist Dr Glenys Opitz and he was due for removal on 6/25/2018  He recovered well from this procedure and was in his normal state of health until 2 days prior to admission when he started developed dysuria, urgency, frequency worse at night with malaise  This morning he took could his temperature and it was 100 6 but given his persistence of symptoms he came into the ED for evaluation  He had no abdominal pain, suprapubic or flank pain  He had no nausea and no diarrhea  He has no chest pain or shortness of breath             Review of Systems:    Review of Systems    Past Medical and Surgical History:     Past Medical History:   Diagnosis Date    Bladder neck contracture     BPH with obstruction/lower urinary tract symptoms     DDD (degenerative disc disease), cervical     DDD (degenerative disc disease), lumbosacral     Elevated PSA     Hydronephrosis     Hyperlipidemia     Kidney stone     in past    Nocturia     Wears glasses        Past Surgical History:   Procedure Laterality Date    BACK SURGERY      lumbar x2    COLONOSCOPY      CYSTOSCOPY      CYSTOSCOPY W/ RETROGRADES      EXTRACORPOREAL SHOCK WAVE LITHOTRIPSY      Renal    INGUINAL HERNIA REPAIR Bilateral     [place] CYSTO/URETERO W/LITHOTRIPSY &INDWELL STENT INSRT Right 6/7/2018    Procedure: CYSTOSCOPY, RIGHT RETROGRADE PYELOGRAM, RIGHT URETEROSCOPY, LASER LITHOTRIPSY, STENT INSERTION;  Surgeon: Bebo Aquino MD;  Location: [facility];  Service: Urology    SHOULDER SURGERY Bilateral     rotator cuff    TOTAL HIP ARTHROPLASTY Bilateral     TRANSURETHRAL RESECTION OF PROSTATE         Meds/Allergies:    Prior to Admission medications    Medication Sig Start Date End Date Taking? Authorizing Provider   pravastatin (PRAVACHOL) 20 mg tablet Take 20 mg by mouth daily     Yes Historical Provider, MD   tamsulosin (FLOMAX) 0 4 mg Take 0 4 mg by mouth daily with dinner   Yes Historical Provider, MD     I have reviewed home medications with patient personally  Allergies:    Allergies   Allergen Reactions    Penicillins Hives       Social History:     Marital Status: /Civil Union   Occupation:   Patient Pre-hospital Living Situation:   Patient Pre-hospital Level of Mobility:   Patient Pre-hospital Diet Restrictions:   Substance Use History:   History   Alcohol Use    Yes     Comment: 4x wk     History   Smoking Status    Former Smoker    Quit date: 5/30/1983   Smokeless Tobacco    Never Used     History   Drug Use No       Family History:    Family History   Problem Relation Age of Onset    Breast cancer Mother     Cancer Father        Physical Exam:     Vitals:   Blood Pressure: 132/71 (06/22/18 1430)  Pulse: 90 (06/22/18 1430)  Temperature: 98 7 °F (37 1 °C) (06/22/18 1430)  Temp Source: Temporal (06/22/18 1430)  Respirations: 16 (06/22/18 1430)  Height: 5' 8" (172 7 cm) (06/22/18 1430)  Weight - Scale: 73 7 kg (162 lb 7 7 oz) (06/22/18 1430)  SpO2: 94 % (06/22/18 1430)    Physical Exam   Constitutional: He appears well-developed  No distress  Appears ill stated age  nad   HENT:   Head: Normocephalic and atraumatic  Right Ear: External ear normal    Left Ear: External ear normal    Eyes: Conjunctivae are normal  Right eye exhibits no discharge  Left eye exhibits no discharge  No scleral icterus  Cardiovascular: Normal rate and regular rhythm  Exam reveals no gallop and no friction rub  No murmur heard  Pulmonary/Chest: Effort normal and breath sounds normal  No respiratory distress  He has no wheezes  He has no rales  Abdominal: Soft  He exhibits no distension  There is no tenderness  There is no rebound and no guarding  No cva tenderness to fist percussion   Musculoskeletal: He exhibits no edema  Neurological: He is alert  Skin: Skin is warm and dry  He is not diaphoretic  No erythema  Psychiatric: He has a normal mood and affect  Vitals reviewed  Additional Data:     Lab Results: I have personally reviewed pertinent reports  Results from last 7 days  Lab Units 06/22/18  1129   WBC Thousand/uL 19 76*   HEMOGLOBIN g/dL 12 5   HEMATOCRIT % 36 7   PLATELETS Thousands/uL 285   NEUTROS PCT % 87*   LYMPHS PCT % 7*   MONOS PCT % 6   EOS PCT % 0       Results from last 7 days  Lab Units 06/22/18  1129   SODIUM mmol/L 139   POTASSIUM mmol/L 3 5   CHLORIDE mmol/L 104   CO2 mmol/L 26   BUN mg/dL 22   CREATININE mg/dL 1 37*   CALCIUM mg/dL 8 9   GLUCOSE RANDOM mg/dL 119           Imaging: none this admission     Xr Chest Pa & Lateral    Result Date: 6/2/2018  Narrative: CHEST INDICATION:   N32 0: Bladder-neck obstruction N13 30: Unspecified hydronephrosis  COMPARISON:  None EXAM PERFORMED/VIEWS:  XR CHEST PA & LATERAL FINDINGS: Cardiomediastinal silhouette appears unremarkable  The lungs are clear  No pneumothorax or pleural effusion   Osseous structures appear within normal limits for patient age  Impression: No acute cardiopulmonary disease  Workstation performed: [id number]     Xr Abdomen 1 View Kub    Result Date: 6/22/2018  Narrative: ABDOMEN INDICATION:   N13 2: Hydronephrosis with renal and ureteral calculous obstruction  COMPARISON:  Retrograde pyelogram June 17, 2018 VIEWS:  AP supine Images: 2 FINDINGS: There is a nonobstructive bowel gas pattern  Moderate amount of feces in the colon  No discernible free air on this supine study  Upright or left lateral decubitus imaging is more sensitive to detect subtle free air in the appropriate setting  Double-J catheter in the right collecting system  Visualized lung bases are clear  Visualized osseous structures are unremarkable for the patient's age  Degenerative changes lumbar spine  Postoperative changes bilateral hips  Impression: Double-J catheter right collecting system  Workstation performed: [id number]     Xr Retrograde Pyelogram    Result Date: 6/9/2018  Narrative: RIGHT RETROGRADE PYELOGRAM INDICATION:   N32 0: Bladder-neck obstruction  COMPARISON: None IMAGES:  16 FLUOROSCOPY TIME:   3 minutes 46 seconds CONTRAST: 17 mL Omnipaque 240 FINDINGS: Fluoroscopic guidance provided for retrograde pyelogram  Initial images demonstrated normal course of the ureter  There were several filling defects noted within the ureter corresponding to calculi  There is prominence of the right renal pelvis  Subsequent images demonstrated stent placement within the right collecting system Osseous and soft tissue detail limited by technique  Impression: Fluoroscopic guidance provided for right retrograde pyelogram and stent placement  Please see procedure report for further details   Workstation performed: [id number]       EKG, Pathology, and Other Studies Reviewed on Admission:   · EKG: nsr    Allscripts / Epic Records Reviewed: Yes     ** Please Note: This note has been constructed using a voice recognition system   **

## 2018-06-22 NOTE — ED PROVIDER NOTES
History  Chief Complaint   Patient presents with    Painful Urination     Burning with urination since midnight  T 100 6 at home  Had cysto with lithotripsy right side and stent placement 6/7  Due for stent out 1025      53-year-old male presents for evaluation of dysuria  Patient states he had gradual onset of dysuria started 1-2 days ago  It is present every time he pees, getting progressively worse and is without modifying factors  It is associated with feeling that he has a difficulty with urination  Patient denies nausea, vomiting, fevers, chills abdominal/back pain, testicular complaints, penile complaints, rash, anorexia  Patient had a stent placed status post lithotripsy on June 7th  He had outpatient KUB done today which reveals stent to be in adequate position upon my read  History provided by:  Patient and medical records      Prior to Admission Medications   Prescriptions Last Dose Informant Patient Reported?  Taking?   pravastatin (PRAVACHOL) 20 mg tablet 6/21/2018 at Unknown time  Yes Yes   Sig: Take 20 mg by mouth daily     tamsulosin (FLOMAX) 0 4 mg 6/21/2018 at Unknown time  Yes Yes   Sig: Take 0 4 mg by mouth daily with dinner      Facility-Administered Medications: None       Past Medical History:   Diagnosis Date    Bladder neck contracture     BPH with obstruction/lower urinary tract symptoms     DDD (degenerative disc disease), cervical     DDD (degenerative disc disease), lumbosacral     Elevated PSA     Hydronephrosis     Hyperlipidemia     Kidney stone     in past    Nocturia     Wears glasses        Past Surgical History:   Procedure Laterality Date    BACK SURGERY      lumbar x2    COLONOSCOPY      CYSTOSCOPY      CYSTOSCOPY W/ RETROGRADES      EXTRACORPOREAL SHOCK WAVE LITHOTRIPSY      Renal    INGUINAL HERNIA REPAIR Bilateral     [place] CYSTO/URETERO W/LITHOTRIPSY &INDWELL STENT INSRT Right 6/7/2018    Procedure: CYSTOSCOPY, RIGHT RETROGRADE PYELOGRAM, RIGHT URETEROSCOPY, LASER LITHOTRIPSY, STENT INSERTION;  Surgeon: Leroy Rosario MD;  Location: [facility];  Service: Urology    SHOULDER SURGERY Bilateral     rotator cuff    TOTAL HIP ARTHROPLASTY Bilateral     TRANSURETHRAL RESECTION OF PROSTATE         Family History   Problem Relation Age of Onset    Breast cancer Mother     Cancer Father      I have reviewed and agree with the history as documented  Social History   Substance Use Topics    Smoking status: Former Smoker     Quit date: 5/30/1983    Smokeless tobacco: Never Used    Alcohol use Yes      Comment: 4x wk        Review of Systems   Constitutional: Negative for activity change, appetite change, fatigue and fever  HENT: Negative for congestion, dental problem, ear pain, rhinorrhea and sore throat  Eyes: Negative for pain and redness  Respiratory: Negative for chest tightness, shortness of breath and wheezing  Cardiovascular: Negative for chest pain and palpitations  Gastrointestinal: Negative for abdominal pain, blood in stool, constipation, diarrhea, nausea and vomiting  Endocrine: Negative for cold intolerance and heat intolerance  Genitourinary: Positive for difficulty urinating and dysuria  Negative for frequency and hematuria  Musculoskeletal: Negative for arthralgias and myalgias  Skin: Negative for color change, pallor and rash  Neurological: Negative for weakness and numbness  Hematological: Does not bruise/bleed easily  Psychiatric/Behavioral: Negative for agitation, hallucinations and suicidal ideas  Physical Exam  Physical Exam   Constitutional: He is oriented to person, place, and time  He appears well-developed and well-nourished  HENT:   Mouth/Throat: No oropharyngeal exudate  TMs normal bilaterally no pharyngeal erythema no rhinorrhea nontender palpation of sinuses, normal looking turbinates   Eyes: Conjunctivae and EOM are normal    Neck: Normal range of motion  Neck supple     No meningeal signs   Cardiovascular: Normal rate, regular rhythm, normal heart sounds and intact distal pulses  Pulmonary/Chest: Effort normal and breath sounds normal  No respiratory distress  He has no wheezes  He has no rales  He exhibits no tenderness  Abdominal: Soft  Bowel sounds are normal  He exhibits no distension and no mass  There is no tenderness  No hernia  No cvat   Musculoskeletal: Normal range of motion  He exhibits no edema  Lymphadenopathy:     He has no cervical adenopathy  Neurological: He is alert and oriented to person, place, and time  No cranial nerve deficit  Skin: No rash noted  No erythema  No edema   Psychiatric: He has a normal mood and affect  His behavior is normal    Nursing note and vitals reviewed  Vital Signs  ED Triage Vitals [06/22/18 1036]   Temperature Pulse Respirations Blood Pressure SpO2   98 7 °F (37 1 °C) (!) 118 16 119/84 97 %      Temp Source Heart Rate Source Patient Position - Orthostatic VS BP Location FiO2 (%)   Oral Monitor Sitting Right arm --      Pain Score       3           Vitals:    06/22/18 1036 06/22/18 1200   BP: 119/84 120/73   Pulse: (!) 118 (!) 108   Patient Position - Orthostatic VS: Sitting        Visual Acuity      ED Medications  Medications   acetaminophen (TYLENOL) tablet 650 mg (650 mg Oral Given 6/22/18 1201)   ceftriaxone (ROCEPHIN) 1 g/50 mL in dextrose IVPB (1,000 mg Intravenous New Bag 6/22/18 1208)       Diagnostic Studies  Results Reviewed     Procedure Component Value Units Date/Time    Lactic acid x2 Q2H [[id number]]  (Normal) Collected:  06/22/18 1202    Lab Status:  Final result Specimen:  Blood from Arm, Left Updated:  06/22/18 1228     LACTIC ACID 1 0 mmol/L     Narrative:         Result may be elevated if tourniquet was used during collection  Blood culture #2 [[id number]] Collected:  06/22/18 1202    Lab Status:   In process Specimen:  Blood from Arm, Left Updated:  06/22/18 1208    Blood culture #1 [[id number]] Collected: 06/22/18 1203    Lab Status: In process Specimen:  Blood from Arm, Right Updated:  06/22/18 1208    Lactic acid x2 Q2H [[id number]]     Lab Status:  No result Specimen:  Blood     Urine Microscopic [[id number]]  (Abnormal) Collected:  06/22/18 1130    Lab Status:  Final result Specimen:  Urine from Urine, Clean Catch Updated:  06/22/18 1147     RBC, UA 10-20 (A) /hpf      WBC, UA Innumerable (A) /hpf      Epithelial Cells Occasional /hpf      Bacteria, UA Innumerable (A) /hpf     Urine culture [[id number]] Collected:  06/22/18 1130    Lab Status: In process Specimen:  Urine from Urine, Clean Catch Updated:  06/22/18 9399    Basic metabolic panel [[id number]]  (Abnormal) Collected:  06/22/18 1129    Lab Status:  Final result Specimen:  Blood from Arm, Left Updated:  06/22/18 1145     Sodium 139 mmol/L      Potassium 3 5 mmol/L      Chloride 104 mmol/L      CO2 26 mmol/L      Anion Gap 9 mmol/L      BUN 22 mg/dL      Creatinine 1 37 (H) mg/dL      Glucose 119 mg/dL      Calcium 8 9 mg/dL      eGFR 52 ml/min/1 73sq m     Narrative:         National Kidney Disease Education Program recommendations are as follows:  GFR calculation is accurate only with a steady state creatinine  Chronic Kidney disease less than 60 ml/min/1 73 sq  meters  Kidney failure less than 15 ml/min/1 73 sq  meters      CBC and differential [[id number]]  (Abnormal) Collected:  06/22/18 1129    Lab Status:  Final result Specimen:  Blood from Arm, Left Updated:  06/22/18 1139     WBC 19 76 (H) Thousand/uL      RBC 4 07 Million/uL      Hemoglobin 12 5 g/dL      Hematocrit 36 7 %      MCV 90 fL      MCH 30 7 pg      MCHC 34 1 g/dL      RDW 14 6 %      MPV 9 1 fL      Platelets 406 Thousands/uL      nRBC 0 /100 WBCs      Neutrophils Relative 87 (H) %      Lymphocytes Relative 7 (L) %      Monocytes Relative 6 %      Eosinophils Relative 0 %      Basophils Relative 0 %      Neutrophils Absolute 17 24 (H) Thousands/µL      Lymphocytes Absolute 1 36 Thousands/µL Monocytes Absolute 1 13 Thousand/µL      Eosinophils Absolute 0 01 Thousand/µL      Basophils Absolute 0 02 Thousands/µL     POCT urinalysis dipstick [[id number]]  (Normal) Resulted:  06/22/18 1130    Lab Status:  Final result Specimen:  Urine Updated:  06/22/18 1138     Color, UA yellow    ED Urine Macroscopic [[id number]]  (Abnormal) Collected:  06/22/18 1130    Lab Status:  Final result Specimen:  Urine Updated:  06/22/18 1125     Color, UA Yellow     Clarity, UA Cloudy     pH, UA 6 0     Leukocytes, UA Large (A)     Nitrite, UA Positive (A)     Protein,  (2+) (A) mg/dl      Glucose, UA Negative mg/dl      Ketones, UA Negative mg/dl      Urobilinogen, UA 0 2 E U /dl      Bilirubin, UA Negative     Blood, UA Moderate (A)     Specific [place], UA 1 020    Narrative:       CLINITEK RESULT                 US retroperitoneal complete    (Results Pending)              Procedures  CriticalCare Time  Performed by: Emma Bernardo by: Crystal Magdaleno, [facility] provider statement:     Critical care time (minutes):  35    Critical care time was exclusive of:  Separately billable procedures and treating other patients and teaching time    Critical care was necessary to treat or prevent imminent or life-threatening deterioration of the following conditions:  Sepsis    Critical care was time spent personally by me on the following activities:  Blood draw for specimens, obtaining history from patient or surrogate, development of treatment plan with patient or surrogate, discussions with consultants, evaluation of patient's response to treatment, examination of patient, interpretation of cardiac output measurements, ordering and performing treatments and interventions, ordering and review of laboratory studies, ordering and review of radiographic studies, re-evaluation of patient's condition and review of old charts           Phone Contacts  ED Phone Contact    ED Course  ED Course as of Jun 22 1245   Fri Jun 22, 2018   1153 New will tx with abx Nitrite, UA: (!) Positive   1153 baseline Creatinine: (!) 1 37   1154 WBC: (!) 19 76   1154 Outpatient KUB reviewed  Stent in satisfactory position  1155 Urology paged                          Initial Sepsis Screening     [address] Name 06/22/18 1155                Is the patient's history suggestive of a new or worsening infection? (!)  Yes (Proceed)  -MH        Suspected source of infection urinary tract infection  -MH        Are two or more of the following signs & symptoms of infection both present and new to the patient? (!)  Yes (Proceed)  -MH        Indicate SIRS criteria Tachycardia > 90 bpm;Leukocytosis (WBC > 20744 IJL)  -MH        If the answer is yes to both questions, suspicion of sepsis is present          If severe sepsis is present AND tissue hypoperfusion perists in the hour after fluid resuscitation or lactate > 4, the patient meets criteria for SEPTIC SHOCK          Are any of the following organ dysfunction criteria present within 6 hours of suspected infection and SIRS criteria that are NOT considered to be chronic conditions?         Organ dysfunction          Date of presentation of severe sepsis          Time of presentation of severe sepsis          Tissue hypoperfusion persists in the hour after crystalloid fluid administration, evidenced, by either:          Was hypotension present within one hour of the conclusion of crystalloid fluid administration?         Date of presentation of septic shock          Time of presentation of septic shock            User Key  (r) = Recorded By, (t) = Taken By, (c) = Cosigned By    [address] Name Provider Type    Amira Whittaker MD Physician                  MDM  Number of Diagnoses or Management Options  Acute urinary tract infection:   History of renal stent:   Sepsis [facility]):   Diagnosis management comments:  The dysuria with recent stent placement-will do septic workup, antibiotics, IV fluids, admit    CritCare Time    Disposition  Final diagnoses:   Acute urinary tract infection   Sepsis ([facility] [address] )   History of renal stent     Time reflects when diagnosis was documented in both MDM as applicable and the Disposition within this note     Time User Action Codes Description Comment    6/22/2018 12:34 PM Black Acre Add [N39 0] Acute urinary tract infection     6/22/2018 12:39 PM [place] Acre Add [A41 9] Sepsis ([facility] [address] )     6/22/2018 12:39 PM Kaitlynn Linares Add [I96 251] History of renal stent       ED Disposition     ED Disposition Condition Comment    Admit  Case was discussed with Dr Angelica Andrade and the patient's admission status was agreed to be Admission Status: inpatient status to the service of Dr Bc Guido   Follow-up Information    None         Patient's Medications   Discharge Prescriptions    No medications on file     No discharge procedures on file      ED Provider  Electronically Signed by           Getachew Quinones MD  06/22/18 4929

## 2018-06-22 NOTE — ED NOTES
Returned from [facility]  EKG in progress then going to room 554       Liana Jackson, RN  06/22/18 2118

## 2018-06-22 NOTE — TELEPHONE ENCOUNTER
Patient was transferred from the call center  Patient complains of fever of 100 7, chills, shaking, cloudy urine with burning  Patient has had stent in for 2 weeks and is to have stent pulled on Monday  Advised patient with those symptoms he needs to go to the ER which he agreed

## 2018-06-22 NOTE — ASSESSMENT & PLAN NOTE
1 37 today from 1 27 last month unclear a KI verses development of CKD  No recent data preceding save creatinine 0 9 in 2017  Start IVF hydration at 100cc/hr  Check UPC and pvr given hx of bph/bladder neck contracture  F/u bmp in am

## 2018-06-22 NOTE — PLAN OF CARE
DISCHARGE PLANNING     Discharge to home or other facility with appropriate resources Progressing        GENITOURINARY - ADULT     Maintains or returns to baseline urinary function Progressing     Absence of urinary retention Progressing     Urinary catheter remains patent Progressing        HEMATOLOGIC - ADULT     Maintains hematologic stability Progressing        INFECTION - ADULT     Absence or prevention of progression during hospitalization Progressing     Absence of fever/infection during neutropenic period Progressing        Knowledge Deficit     Patient/family/caregiver demonstrates understanding of disease process, treatment plan, medications, and discharge instructions Progressing        PAIN - ADULT     Verbalizes/displays adequate comfort level or baseline comfort level Progressing        SAFETY ADULT     Patient will remain free of falls Progressing     Maintain or return to baseline ADL function Progressing     Maintain or return mobility status to optimal level Progressing

## 2018-06-22 NOTE — ASSESSMENT & PLAN NOTE
In the setting of recent stenting for renal stone and lithotripsy  C/b sepsis  rec'd rocephin in ED  Will admit to IP medicine, continue rocephin  Monitor vs and cbc

## 2018-06-22 NOTE — ASSESSMENT & PLAN NOTE
2* recent ureteral calculus w/lithotripsy on 06/7/18 due out on 6/25/18 by urology  Will f/u renal u/s to r/o migrated stent or worsening hydronephrosis given elevated creatinine  May benefit from removal of stent while IP will defer to urology as consult is pending

## 2018-06-23 LAB
ANION GAP SERPL CALCULATED.3IONS-SCNC: 9 MMOL/L (ref 4–13)
BASOPHILS # BLD AUTO: 0.02 THOUSANDS/ΜL (ref 0–0.1)
BASOPHILS NFR BLD AUTO: 0 % (ref 0–1)
BUN SERPL-MCNC: 22 MG/DL (ref 5–25)
CALCIUM SERPL-MCNC: 8.7 MG/DL (ref 8.3–10.1)
CHLORIDE SERPL-SCNC: 108 MMOL/L (ref 100–108)
CO2 SERPL-SCNC: 22 MMOL/L (ref 21–32)
CREAT SERPL-MCNC: 1.21 MG/DL (ref 0.6–1.3)
EOSINOPHIL # BLD AUTO: 0.09 THOUSAND/ΜL (ref 0–0.61)
EOSINOPHIL NFR BLD AUTO: 1 % (ref 0–6)
ERYTHROCYTE [DISTWIDTH] IN BLOOD BY AUTOMATED COUNT: 14.6 % (ref 11.6–15.1)
GFR SERPL CREATININE-BSD FRML MDRD: 60 ML/MIN/1.73SQ M
GLUCOSE SERPL-MCNC: 108 MG/DL (ref 65–140)
HCT VFR BLD AUTO: 32.3 % (ref 36.5–49.3)
HGB BLD-MCNC: 10.9 G/DL (ref 12–17)
LYMPHOCYTES # BLD AUTO: 2.09 THOUSANDS/ΜL (ref 0.6–4.47)
LYMPHOCYTES NFR BLD AUTO: 21 % (ref 14–44)
MCH RBC QN AUTO: 30.4 PG (ref 26.8–34.3)
MCHC RBC AUTO-ENTMCNC: 33.7 G/DL (ref 31.4–37.4)
MCV RBC AUTO: 90 FL (ref 82–98)
MONOCYTES # BLD AUTO: 0.86 THOUSAND/ΜL (ref 0.17–1.22)
MONOCYTES NFR BLD AUTO: 9 % (ref 4–12)
NEUTROPHILS # BLD AUTO: 6.79 THOUSANDS/ΜL (ref 1.85–7.62)
NEUTS SEG NFR BLD AUTO: 69 % (ref 43–75)
NRBC BLD AUTO-RTO: 0 /100 WBCS
PLATELET # BLD AUTO: 260 THOUSANDS/UL (ref 149–390)
PMV BLD AUTO: 9.2 FL (ref 8.9–12.7)
POTASSIUM SERPL-SCNC: 3.6 MMOL/L (ref 3.5–5.3)
RBC # BLD AUTO: 3.59 MILLION/UL (ref 3.88–5.62)
SODIUM SERPL-SCNC: 139 MMOL/L (ref 136–145)
WBC # BLD AUTO: 9.85 THOUSAND/UL (ref 4.31–10.16)

## 2018-06-23 PROCEDURE — 80048 BASIC METABOLIC PNL TOTAL CA: CPT | Performed by: PHYSICIAN ASSISTANT

## 2018-06-23 PROCEDURE — 85025 COMPLETE CBC W/AUTO DIFF WBC: CPT | Performed by: PHYSICIAN ASSISTANT

## 2018-06-23 PROCEDURE — 99232 SBSQ HOSP IP/OBS MODERATE 35: CPT | Performed by: UROLOGY

## 2018-06-23 PROCEDURE — 99232 SBSQ HOSP IP/OBS MODERATE 35: CPT | Performed by: INTERNAL MEDICINE

## 2018-06-23 RX ADMIN — HEPARIN SODIUM 5000 UNITS: 5000 INJECTION, SOLUTION INTRAVENOUS; SUBCUTANEOUS at 21:15

## 2018-06-23 RX ADMIN — HEPARIN SODIUM 5000 UNITS: 5000 INJECTION, SOLUTION INTRAVENOUS; SUBCUTANEOUS at 05:28

## 2018-06-23 RX ADMIN — TAMSULOSIN HYDROCHLORIDE 0.4 MG: 0.4 CAPSULE ORAL at 21:15

## 2018-06-23 RX ADMIN — HEPARIN SODIUM 5000 UNITS: 5000 INJECTION, SOLUTION INTRAVENOUS; SUBCUTANEOUS at 15:16

## 2018-06-23 RX ADMIN — CEFTRIAXONE 1000 MG: 1 INJECTION, POWDER, FOR SOLUTION INTRAMUSCULAR; INTRAVENOUS at 11:15

## 2018-06-23 RX ADMIN — SODIUM CHLORIDE 100 ML/HR: 0.9 INJECTION, SOLUTION INTRAVENOUS at 10:00

## 2018-06-23 RX ADMIN — PRAVASTATIN SODIUM 20 MG: 20 TABLET ORAL at 21:15

## 2018-06-23 NOTE — PROGRESS NOTES
[facility] Progress Note  Patient: Faye Roman 77 y o  male   MRN: [id number]  PCP: Gail Chilel DO  Unit/Bed#: E5 MS [address]78 Encounter: [id number]  Date Of Visit: 18    Assessment:    Principal Problem:    Complicated UTI (urinary tract infection)  Active Problems:    Benign localized hyperplasia of prostate with urinary obstruction and lower urinary tract symptoms    Sepsis ([address] )    History of renal stent    Elevated serum creatinine      Plan:    1 )  Complicated UTI  -in setting of recent renal stone and lithotripsy  -continue with empiric antibiotics  -follow-up cultures    2 )  Sepsis-present on admission  -criteria met by tachycardia, leukocytosis and UTI    3 )  Acute kidney injury  -secondary to UTI and sepsis  -now resolved, patient creatinine is 1 21    4 )  Ultrasound finding  -ultrasound kidney bladder revealed 4 6 x 1 3 cm right renal subcapsular collection could represent small subcapsular hematoma or abscess  -urology input appreciated, recommended to continue with empiric antibiotics and follow up cultures  -patient will need stent removal in office next week    VTE Pharmacologic Prophylaxis:   Pharmacologic: heparin    Patient Centered Rounds: I have performed bedside rounds with nursing staff today  Discussions with Specialists or Other Care Team Provider:  Urologist    Time Spent for Care: 20 minutes  More than 50% of total time spent on counseling and coordination of care as described above      Current Length of Stay: 1 day(s)    Current Patient Status: Inpatient   Certification Statement: The patient will continue to require additional inpatient hospital stay due to sepsis    Discharge Plan / Estimated Discharge Date: 24-48 hours    Code Status: Level 1 - Full Code      Subjective:   Patient seen and examined at bedside, currently denies any complaints    Objective:     Vitals:   Temp (24hrs), Av 2 °F (37 3 °C), Min:98 4 °F (36 9 °C), Max:100 5 °F (38 1 °C)    HR:  [86-97] 87  Resp:  [16-18] 18  BP: (111-141)/(66-78) 111/66  SpO2:  [94 %-96 %] 95 %  Body mass index is 24 7 kg/m²  Input and Output Summary (last 24 hours): Intake/Output Summary (Last 24 hours) at 06/23/18 1311  Last data filed at 06/23/18 1115   Gross per 24 hour   Intake             1980 ml   Output                0 ml   Net             1980 ml       Physical Exam:    Constitutional: Patient is oriented to person, place and time, no acute distress  HEENT:  Normocephalic, atraumatic, EOMI, PERRLA, no scleral icterus, no pallor, moist oral mucosa  Neck:  Supple, no masses, no thyromegaly, no bruits Normal range of motion  Lymph nodes:  No lymphadenopathy  Cardiovascular: Normal S1S2, RRR, No murmurs/rubs/gallops appreciated  Pulmonary: Clear to auscultation bilaterally, No rhonchi/rales/wheezing appreciated  Abdominal: Soft, Bowel sounds present, Non-tender, Non-distended, No rebound/guarding, no hepatomegaly   Musculoskeletal: No tenderness/abnormality   Extremities:  No cyanosis, clubbing or edema  Peripheral pulses palpable and equal bilaterally  Neurological: Cranial nerves II-XII grossly intact, sensation intact, otherwise no focal neurological symptoms  Skin: Skin is warm and dry, no rashes  Additional Data:     Labs:      Results from last 7 days  Lab Units 06/23/18  0526   WBC Thousand/uL 9 85   HEMOGLOBIN g/dL 10 9*   HEMATOCRIT % 32 3*   PLATELETS Thousands/uL 260   NEUTROS PCT % 69   LYMPHS PCT % 21   MONOS PCT % 9   EOS PCT % 1       Results from last 7 days  Lab Units 06/23/18  0526   SODIUM mmol/L 139   POTASSIUM mmol/L 3 6   CHLORIDE mmol/L 108   CO2 mmol/L 22   BUN mg/dL 22   CREATININE mg/dL 1 21   CALCIUM mg/dL 8 7   GLUCOSE RANDOM mg/dL 108            I Have Reviewed All Lab Data Listed Above      Recent Cultures (last 7 days):           Last 24 Hours Medication List:     Current Facility-Administered Medications:  acetaminophen 650 mg Oral Q6H PRN Isaias Saybrandi JANAE Sandy    cefTRIAXone 1,000 mg Intravenous Q24H Gail Sandy PA-C Last Rate: Stopped (06/23/18 1145)   heparin (porcine) 5,000 Units Subcutaneous [facility] Gail Sandy PA-C    ondansetron 4 mg Intravenous Q4H PRN Gail Sandy PA-C    pravastatin 20 mg Oral HS Arielle Vo MD    sodium chloride 100 mL/hr Intravenous Continuous Gail Sandy PA-C Last Rate: 100 mL/hr (06/23/18 1145)   tamsulosin 0 4 mg Oral Daily With Ena Hernandez PA-C         Today, Patient Was Seen By: Arielle Vo MD

## 2018-06-23 NOTE — PROGRESS NOTES
Progress Note - Urology Progress  Elina Pemberton 77 y o  male MRN: [id number]  Unit/Bed#: E5 -01 Encounter: [id number]    Assessment:  Postop fever, cultures pending  Has no symptoms from small  subcapsular hematoma which will resolve spontaneously  No evidence that that needs draining  Plan:  Await cultures, continue meds  Likely ready for discharge tomorrow if fever down  Will have stent out in office this week      Blood pressure 111/66, pulse 87, temperature 98 4 °F (36 9 °C), temperature source Temporal, resp  rate 18, height 5' 8" (1 727 m), weight 73 7 kg (162 lb 7 7 oz), SpO2 95 %  ,Body mass index is 24 7 kg/m²        Intake/Output Summary (Last 24 hours) at 06/23/18 1116  Last data filed at 06/23/18 1115   Gross per 24 hour   Intake             2070 ml   Output                0 ml   Net             2070 ml       Invasive Devices     Peripheral Intravenous Line            Peripheral IV 06/22/18 Left Forearm less than 1 day          Drain            Ureteral Drain/Stent Right ureter 6 Fr  15 days                Physical Exam: General appearance: alert and oriented, in no acute distress  Abdomen: soft, non-tender; bowel sounds normal; no masses,  no organomegaly    Lab, Imaging and other studies:  CBC:   Lab Results   Component Value Date    WBC 9 85 06/23/2018    HGB 10 9 (L) 06/23/2018    HCT 32 3 (L) 06/23/2018    MCV 90 06/23/2018     06/23/2018    MCH 30 4 06/23/2018    MCHC 33 7 06/23/2018    RDW 14 6 06/23/2018    MPV 9 2 06/23/2018    NRBC 0 06/23/2018   , CMP:   Lab Results   Component Value Date     06/23/2018    K 3 6 06/23/2018     06/23/2018    CO2 22 06/23/2018    ANIONGAP 9 06/23/2018    BUN 22 06/23/2018    CREATININE 1 21 06/23/2018    GLUCOSE 108 06/23/2018    CALCIUM 8 7 06/23/2018    EGFR 60 06/23/2018     }

## 2018-06-24 VITALS
TEMPERATURE: 98.2 F | BODY MASS INDEX: 24.62 KG/M2 | HEART RATE: 90 BPM | WEIGHT: 162.48 LBS | DIASTOLIC BLOOD PRESSURE: 90 MMHG | HEIGHT: 68 IN | OXYGEN SATURATION: 98 % | RESPIRATION RATE: 18 BRPM | SYSTOLIC BLOOD PRESSURE: 153 MMHG

## 2018-06-24 PROBLEM — A41.9 SEPSIS (HCC): Status: RESOLVED | Noted: 2018-06-22 | Resolved: 2018-06-24

## 2018-06-24 LAB
ANION GAP SERPL CALCULATED.3IONS-SCNC: 9 MMOL/L (ref 4–13)
ATRIAL RATE: 98 BPM
BACTERIA UR CULT: ABNORMAL
BUN SERPL-MCNC: 16 MG/DL (ref 5–25)
CALCIUM SERPL-MCNC: 8.8 MG/DL (ref 8.3–10.1)
CHLORIDE SERPL-SCNC: 109 MMOL/L (ref 100–108)
CO2 SERPL-SCNC: 24 MMOL/L (ref 21–32)
CREAT SERPL-MCNC: 1.2 MG/DL (ref 0.6–1.3)
GFR SERPL CREATININE-BSD FRML MDRD: 61 ML/MIN/1.73SQ M
GLUCOSE SERPL-MCNC: 110 MG/DL (ref 65–140)
P AXIS: 55 DEGREES
POTASSIUM SERPL-SCNC: 3.9 MMOL/L (ref 3.5–5.3)
PR INTERVAL: 148 MS
QRS AXIS: -15 DEGREES
QRSD INTERVAL: 94 MS
QT INTERVAL: 316 MS
QTC INTERVAL: 403 MS
SODIUM SERPL-SCNC: 142 MMOL/L (ref 136–145)
T WAVE AXIS: 42 DEGREES
VENTRICULAR RATE: 98 BPM

## 2018-06-24 PROCEDURE — 99232 SBSQ HOSP IP/OBS MODERATE 35: CPT | Performed by: UROLOGY

## 2018-06-24 PROCEDURE — 93010 ELECTROCARDIOGRAM REPORT: CPT | Performed by: INTERNAL MEDICINE

## 2018-06-24 PROCEDURE — 99239 HOSP IP/OBS DSCHRG MGMT >30: CPT | Performed by: INTERNAL MEDICINE

## 2018-06-24 PROCEDURE — 80048 BASIC METABOLIC PNL TOTAL CA: CPT | Performed by: INTERNAL MEDICINE

## 2018-06-24 RX ORDER — CIPROFLOXACIN 500 MG/1
500 TABLET, FILM COATED ORAL EVERY 12 HOURS SCHEDULED
Qty: 14 TABLET | Refills: 0 | Status: SHIPPED | OUTPATIENT
Start: 2018-06-24 | End: 2018-06-24

## 2018-06-24 RX ORDER — CIPROFLOXACIN 500 MG/1
500 TABLET, FILM COATED ORAL EVERY 12 HOURS SCHEDULED
Qty: 14 TABLET | Refills: 0 | Status: SHIPPED | OUTPATIENT
Start: 2018-06-24 | End: 2018-07-01

## 2018-06-24 RX ADMIN — CEFTRIAXONE 1000 MG: 1 INJECTION, POWDER, FOR SOLUTION INTRAMUSCULAR; INTRAVENOUS at 11:39

## 2018-06-24 RX ADMIN — HEPARIN SODIUM 5000 UNITS: 5000 INJECTION, SOLUTION INTRAVENOUS; SUBCUTANEOUS at 05:16

## 2018-06-24 NOTE — NURSING NOTE
All d/c instructions were reviewed with the patient and with family at the bedside  All questions answered  IV taken out  All belongings taken  RN ambulated patient off unit in stable condition

## 2018-06-24 NOTE — DISCHARGE SUMMARY
Transition of Care Discharge Summary - [facility]    Patient Information: Jose Maria Carrero 77 y o  male MRN: [id number]  Unit/Bed#: E5 -01 Encounter: [id number]    Discharging Physician / Practitioner: Olivia Guevara MD  PCP: Carmela Olivares DO  Admission Date: 6/22/2018  Discharge Date: 06/24/18    Disposition:      Other: home    For Discharges to [facility] SNF:   · Not Applicable to this Patient - Not Applicable to this Patient    Reason for Admission: fever, chills    Discharge Diagnoses:     Principal Problem:    Complicated UTI (urinary tract infection)  Active Problems:    Benign localized hyperplasia of prostate with urinary obstruction and lower urinary tract symptoms    History of renal stent    Elevated serum creatinine  Resolved Problems:    Sepsis ([address] )      Consultations During Hospital Stay:  · Urology    Procedures Performed:     · none    Medication Adjustments and Discharge Medications:  · Medication Dosing Tapers - Please refer to Discharge Medication List for details on any medication dosing tapers (if applicable to patient)  · Discharge Medication List: See after visit summary for reconciled discharge medications  Wound Care Recommendations:  When applicable, please see wound care section of After Visit Summary  Diet Recommendations at Discharge:  Diet -        Diet Orders            Start     Ordered    06/23/18 1551  Room Service  Once     Question:  Type of Service  Answer:  Room Service-Appropriate    06/23/18 1551    06/22/18 1447  Diet Regular; Regular House  Diet effective now     Question Answer Comment   Diet Type Regular    Regular Regular House    RD to adjust diet per protocol? Yes        06/22/18 1447        Fluid Restriction - No Fluid Restriction at Discharge  Significant Findings / Test Results:     US kidney and bladder:  4 6 x 1 3 cm right renal subcapsular collection could represent small subcapsular hematoma or abscess      2  Right ureteral stent in place with resolution of previously seen hydronephrosis      · 3  Right nephrolithiasis  Left nephrolithiasis reported by the technologist, though confirmatory images cannot be obtained  AXR:  4 6 x 1 3 cm right renal subcapsular collection could represent small subcapsular hematoma or abscess      2  Right ureteral stent in place with resolution of previously seen hydronephrosis      3  Right nephrolithiasis  Left nephrolithiasis reported by the technologist, though confirmatory images cannot be obtained  Hospital Course: Mandi Becerra is a 77 y o  male patient who originally presented to the hospital on 6/22/2018 due to fever, chills, dysuria  Patient has past medical history significant for BPH with recent right ureteral stent placement and lithotripsy  Patient states he has been having fever and chills for last 2 days prior to admission  On imaging patient found to have a 4 6 x 1 3 right renal subcapsular collection which was a hematoma versus abscess  Patient started on antibiotics and Urology was consulted  Urine cultures revealed E coli pansensitive, blood cultures negative, Urology was closely following and patient's symptoms improved  Urology believe that foot collections likely hematoma did not need further imaging  Urology states no further inpatient workup necessary and patient should follow in the office on 06/25/2018 for stent removal   Patient is otherwise medically stable and is to follow PCP in Urology outpatient      Condition at Discharge: good     Discharge Day Visit / Exam:     Subjective:  Patient seen and examined at bedside, denies any complaints    Vitals: Blood Pressure: 153/90 (06/24/18 0801)  Pulse: 90 (06/24/18 0801)  Temperature: 98 2 °F (36 8 °C) (06/24/18 0801)  Temp Source: Temporal (06/24/18 0801)  Respirations: 18 (06/24/18 0801)  Height: 5' 8" (172 7 cm) (06/22/18 1430)  Weight - Scale: 73 7 kg (162 lb 7 7 oz) (06/22/18 1430)  SpO2: 98 % (06/24/18 0801)    Physical Exam:    Constitutional: Patient is oriented to person, place and time, no acute distress  HEENT:  Normocephalic, atraumatic, EOMI, PERRLA, no scleral icterus, no pallor, moist oral mucosa  Neck:  Supple, no masses, no thyromegaly, no bruits Normal range of motion  Lymph nodes:  No lymphadenopathy  Cardiovascular: Normal S1S2, RRR, No murmurs/rubs/gallops appreciated  Pulmonary: Clear to auscultation bilaterally, No rhonchi/rales/wheezing appreciated  Abdominal: Soft, Bowel sounds present, Non-tender, Non-distended, No rebound/guarding, no hepatomegaly   Musculoskeletal: No tenderness/abnormality   Extremities:  No cyanosis, clubbing or edema  Peripheral pulses palpable and equal bilaterally  Neurological: Cranial nerves II-XII grossly intact, sensation intact, otherwise no focal neurological symptoms  Skin: Skin is warm and dry, no rashes  Discharge instructions/Information to patient and family:   See after visit summary section titled Discharge Instructions for information provided to patient and family  Planned Readmission: no     Discharge Statement:  I spent 30 minutes discharging the patient  This time was spent on the day of discharge  I had direct contact with the patient on the day of discharge  Greater than 50% of the total time was spent examining patient, answering all patient questions, arranging and discussing plan of care with patient as well as directly providing post-discharge instructions  Additional time then spent on discharge activities      ** Please Note: This note has been constructed using a voice recognition system **

## 2018-06-24 NOTE — PLAN OF CARE
DISCHARGE PLANNING     Discharge to home or other facility with appropriate resources Adequate for Discharge        GENITOURINARY - ADULT     Maintains or returns to baseline urinary function Adequate for Discharge     Absence of urinary retention Adequate for Discharge     Urinary catheter remains patent Adequate for Discharge        HEMATOLOGIC - ADULT     Maintains hematologic stability Adequate for Discharge        INFECTION - ADULT     Absence or prevention of progression during hospitalization Adequate for Discharge     Absence of fever/infection during neutropenic period Adequate for Discharge        Knowledge Deficit     Patient/family/caregiver demonstrates understanding of disease process, treatment plan, medications, and discharge instructions Adequate for Discharge        PAIN - ADULT     Verbalizes/displays adequate comfort level or baseline comfort level Adequate for Discharge        SAFETY ADULT     Patient will remain free of falls Adequate for Discharge     Maintain or return to baseline ADL function Adequate for Discharge     Maintain or return mobility status to optimal level Adequate for Discharge

## 2018-06-24 NOTE — PROGRESS NOTES
Progress Note - Urology Progress  Mandi Becerra 77 y o  male MRN: [id number]  Unit/Bed#: E5 -01 Encounter: [id number]    Assessment:  Much improved  Blood culture negative  Urine culture still pending    Plan:  If medicine agrees, I think he is ready to go home, once culture is back today, on oral antibiotics  He has appointment in our office tomorrow for probable stent removal, his surgeon will discuss that plan when he sees in tomorrow based on cultures etc      Blood pressure 153/90, pulse 90, temperature 98 2 °F (36 8 °C), temperature source Temporal, resp  rate 18, height 5' 8" (1 727 m), weight 73 7 kg (162 lb 7 7 oz), SpO2 98 %  ,Body mass index is 24 7 kg/m²        Intake/Output Summary (Last 24 hours) at 06/24/18 0811  Last data filed at 06/23/18 1115   Gross per 24 hour   Intake             1125 ml   Output                0 ml   Net             1125 ml       Invasive Devices     Peripheral Intravenous Line            Peripheral IV 06/22/18 Left Forearm 1 day          Drain            Ureteral Drain/Stent Right ureter 6 Fr  16 days                Physical Exam: General appearance: alert and oriented, in no acute distress  Abdomen: soft, non-tender; bowel sounds normal; no masses,  no organomegaly    Lab, Imaging and other studies:  CBC: No results found for: WBC, HGB, HCT, MCV, PLT, ADJUSTEDWBC, MCH, MCHC, RDW, MPV, NRBC, CMP:   Lab Results   Component Value Date     06/24/2018    K 3 9 06/24/2018     (H) 06/24/2018    CO2 24 06/24/2018    ANIONGAP 9 06/24/2018    BUN 16 06/24/2018    CREATININE 1 20 06/24/2018    GLUCOSE 110 06/24/2018    CALCIUM 8 8 06/24/2018    EGFR 61 06/24/2018     }

## 2018-06-24 NOTE — CASE MANAGEMENT
Thank you,  [address] [facility] in the [facility] by Josiah Lizarraga for 2017  Network Utilization Review Department  Phone: [phone]; Fax [phone]  ATTENTION: The Network Utilization Review Department is now centralized for our 7 Facilities  Make a note that we have a new phone and fax numbers for our Department  Please call with any questions or concerns to [phone] and carefully follow the prompts so that you are directed to the right person  All voicemails are confidential  Fax any determinations, approvals, denials, and requests for initial or continue stay review clinical to [phone]  Due to HIGH CALL volume, it would be easier if you could please send faxed requests to expedite your requests and in part, help us provide discharge notifications faster  Initial Clinical Review    Admission: Date/Time/Statement: inpatient  6/22/18 @ 1241     Orders Placed This Encounter   Procedures    Inpatient Admission (expected length of stay for this patient is greater than two midnights)     Standing Status:   Standing     Number of Occurrences:   1     Order Specific Question:   Admitting Physician     Answer:   Mark Jackson     Order Specific Question:   Level of Care     Answer:   Med Surg [16]     Order Specific Question:   Estimated length of stay     Answer:   More than 2 Midnights     Order Specific Question:   Certification     Answer:   I certify that inpatient services are medically necessary for this patient for a duration of greater than two midnights  See H&P and MD Progress Notes for additional information about the patient's course of treatment         ED Arrival Information     Expected Arrival Acuity Means of Arrival Escorted By Service Admission Type    - 6/22/2018 10:25 Urgent Walk-In Self General Medicine Urgent    Arrival Complaint    Fever, UTI        Chief Complaint   Patient presents with    Painful Urination     Burning with urination since midnight  T 100 6 at home  Had cysto with lithotripsy right side and stent placement 6/7  Due for stent out 6/25  History of Illness: 76 yo m to ED c/o dysuria, frequency, fevers, malaise x 2 days  Stent placed 6/7, due for removal 6/25 by urology  Temp 100 6 at home  Appears ill  ED Vital Signs:   ED Triage Vitals [06/22/18 1036]   Temperature Pulse Respirations Blood Pressure SpO2   98 7 °F (37 1 °C) (!) 118 16 119/84 97 %      Temp Source Heart Rate Source Patient Position - Orthostatic VS BP Location FiO2 (%)   Oral Monitor Sitting Right arm --      Pain Score       3        Wt Readings from Last 1 Encounters:   06/22/18 73 7 kg (162 lb 7 7 oz)       Vital Signs (abnormal):   06/22/18 1200  99 8 °F (37 7 °C)   108  --  120/73  97 %  --     06/22/18 2329  100 5 °F (38 1 °C)  86  18  141/78  96 %  None (Room air)  Lying     06/23/18 2225  99 2 °F (37 3 °C)  81  18  120/70  96 %  None (Room air)  Lying     Abnormal Labs/Diagnostic Test Results:   6/22: creat 1 37   Wbc 19 76     UA: cloudy   Mod bld   +nitrities  lg leuks   +2 prot   10-20 rbc   innum wbc/bacteria  occ epithelials  bld c&s pending  Urine c&s positive e  Coli  Renal US: 1   4 6 x 1 3 cm right renal subcapsular collection could represent small subcapsular hematoma or abscess  2   Right ureteral stent in place with resolution of previously seen hydronephrosis  3   Right nephrolithiasis     KUB abd: nonobstructive gas pattern, double J cath R collecting system    6/23: hgb 10 9  hct 32 3    6/24: cl 109     ED Treatment:   Medication Administration from 06/22/2018 1025 to 06/22/2018 1414       Date/Time Order Dose Route Action Action by Comments     06/22/2018 1201 acetaminophen (TYLENOL) tablet 650 mg 650 mg Oral Given Shyann Jordan RN      06/22/2018 1302 ceftriaxone (ROCEPHIN) 1 g/50 mL in dextrose IVPB 0 mg Intravenous Stopped Crista Sandifer, RN      06/22/2018 1208 ceftriaxone (ROCEPHIN) 1 g/50 mL in dextrose IVPB 1,000 mg Intravenous New Bag Sade Rao RN           Past Medical/Surgical History:    Active Ambulatory Problems     Diagnosis Date Noted    BPH with obstruction/lower urinary tract symptoms 04/27/2017    Hyperlipidemia 11/08/2010    Tear of right rotator cuff 10/20/2017    Elevated PSA 03/05/2018    DDD (degenerative disc disease), cervical 03/30/2011    DDD (degenerative disc disease), lumbosacral 03/30/2011    Influenza 01/20/2018    Routine history and physical examination of adult 45/60/0715    Complicated UTI (urinary tract infection) 04/16/2018    Bladder neck contracture 05/22/2018    Other hydronephrosis 05/22/2018    Ureteral stone with hydronephrosis 05/22/2018    Benign localized hyperplasia of prostate with urinary obstruction and lower urinary tract symptoms 08/24/2017    Cramp of both lower extremities 05/04/2017     Resolved Ambulatory Problems     Diagnosis Date Noted    No Resolved Ambulatory Problems     Past Medical History:   Diagnosis Date    Bladder neck contracture     BPH with obstruction/lower urinary tract symptoms     DDD (degenerative disc disease), cervical     DDD (degenerative disc disease), lumbosacral     Elevated PSA     Hydronephrosis     Hyperlipidemia     Kidney stone     Nocturia     Wears glasses        Admitting Diagnosis: Fever [R50 9]  Acute urinary tract infection [N39 0]  Sepsis ([address] ) [A41 9]  History of renal stent [Z98 [id number]]    Age/Sex: 77 y o  male    Assessment/Plan:   SEPSIS: 2nd to UTI, tachycardic, leukocytosis, monitor labs, antbx, bld c&s  COMPLICATED UTI: recent renal stent with lithotripsy, continue rocephin  Cons urology-may benefit from stent removal; cont flomax  ELEVATED CREAT: was 1 27 last month, unclear if kidney injury vs  ckd   /hr, am labs      Admission Orders:  Scheduled Meds:   Current Facility-Administered Medications:  acetaminophen 650 mg Oral Q6H PRN   cefTRIAXone 1,000 mg Intravenous Q24H   heparin (porcine) 5,000 Units Subcutaneous Q8H [facility]   ondansetron 4 mg Intravenous Q4H PRN   pravastatin 20 mg Oral HS   sodium chloride 100 mL/hr Intravenous Continuous   tamsulosin 0 4 mg Oral Daily With Dinner     hse diet  oob as taylor, activity as taylor  I/O  Measure PVR's  Cbc, bmp in am  Renal US  Cons urology    PER UROLOGY 6/22:  Assessment:  1  Urinary tract infection  2  History of kidney stones status post right ureteroscopic laser lithotripsy of multiple right ureteral stones on 06/07/2018   3   4 6 x 1 3 cm right renal subcapsular collection-probable hematoma versus abscess  Plan:  1  Check urine culture and sensitivity results and adjust antibiotics as needed  2  If patient does not respond to antibiotics clinically, imaging of the right subcapsular collection should be reimaged and possibly drained  Hopefully this can be avoided as this most likely represents a hematoma rather than abscess  Repeat imaging with either CT or ultrasound is recommended to track either the increasing Size Or decreasing size of this collection over time  Stent removal will be considered while the patient is still on antibiotics  3   Eventual metabolic stone workup for stone diathesis  4  The patient has BPH with obstructive symptoms and a history of a bladder neck contracture- after his acute stone episodes resolve consideration towards management of his voiding pattern will take place  PER MED 6/23:   will need stent removal next week   Continue hospital stay due to sepsis, continue antbx    PER UROLOGY 6/23: Await cultures, cont meds    PER UROLOGY 6/24: dc on oral antbx, remove stent in office    DISCHARGE TO HOME 6/24:  Principal Problem:  Complicated UTI (urinary tract infection)  Active Problems:    Benign localized hyperplasia of prostate with urinary obstruction and lower urinary tract symptoms    History of renal stent    Elevated serum creatinine   On imaging patient found to have a 4 6 x 1 3 right renal subcapsular collection which was a hematoma versus abscess  Patient started on antibiotics and Urology was consulted  Urine cultures revealed E coli pansensitive, blood cultures negative, Urology was closely following and patient's symptoms improved  Urology believe that fluid collections likely hematoma did not need further imaging

## 2018-06-25 ENCOUNTER — PROCEDURE VISIT (OUTPATIENT)
Dept: UROLOGY | Facility: MEDICAL CENTER | Age: 68
End: 2018-06-25
Payer: COMMERCIAL

## 2018-06-25 VITALS
WEIGHT: 162 LBS | HEIGHT: 68 IN | BODY MASS INDEX: 24.55 KG/M2 | DIASTOLIC BLOOD PRESSURE: 84 MMHG | SYSTOLIC BLOOD PRESSURE: 142 MMHG

## 2018-06-25 DIAGNOSIS — N13.2 URETERAL STONE WITH HYDRONEPHROSIS: Primary | ICD-10-CM

## 2018-06-25 PROBLEM — N32.0 BLADDER NECK CONTRACTURE: Status: RESOLVED | Noted: 2018-05-22 | Resolved: 2018-06-25

## 2018-06-25 LAB
SL AMB  POCT GLUCOSE, UA: ABNORMAL
SL AMB LEUKOCYTE ESTERASE,UA: ABNORMAL
SL AMB POCT BILIRUBIN,UA: ABNORMAL
SL AMB POCT BLOOD,UA: ABNORMAL
SL AMB POCT CLARITY,UA: CLEAR
SL AMB POCT COLOR,UA: YELLOW
SL AMB POCT KETONES,UA: ABNORMAL
SL AMB POCT NITRITE,UA: ABNORMAL
SL AMB POCT PH,UA: 6
SL AMB POCT SPECIFIC GRAVITY,UA: 1.02
SL AMB POCT URINE PROTEIN: ABNORMAL
SL AMB POCT UROBILINOGEN: 0.2

## 2018-06-25 PROCEDURE — 81003 URINALYSIS AUTO W/O SCOPE: CPT | Performed by: UROLOGY

## 2018-06-25 PROCEDURE — 52310 CYSTOSCOPY AND TREATMENT: CPT | Performed by: UROLOGY

## 2018-06-25 NOTE — LETTER
June 25, 2018     Tara Cloud DO  [address] [facility] [address]    Patient: Jorge Luis Bowman   YOB: 1950   Date of Visit: 6/25/2018       Dear Dr Tera Tiwari: Thank you for referring Jorge Luis Bowman to me for evaluation  Below are my notes for this consultation  If you have questions, please do not hesitate to call me  I look forward to following your patient along with you  Sincerely,        Rancho Cm MD        CC: No Recipients  Rancho Cm MD  6/25/2018 12:45 PM  Sign at close encounter  Cystoscopy  Date/Time: 6/25/2018 12:43 PM  Performed by: Winston Velazquez  Authorized by: Winston Velazquez     Procedure details: simple removal of a foreign body, stone, or stent    Patient tolerance: Patient tolerated the procedure well with no immediate complications    Additional Procedure Details: Patient was brought to the procedure room and placed supine  He was prepped and draped in the usual sterile fashion  2% xylocaine jelly was administered to the urethra     While we were letting the local work, the patient's x-rays were reviewed with him on the [facility] system  The flexible cystoscope was then introduced  The bladder was unremarkable  The stent string was grasped with a grasper and the stent removed easily  The patient tolerated the procedure well  He voided at the conclusion the procedure  Discharge instructions were given  He is on antibiotic and will call for fever, chills or severe pain  He will follow up in 6 weeks  In approximately 1 month we will obtain a renal ultrasound and KUB

## 2018-06-25 NOTE — PROGRESS NOTES
Cystoscopy  Date/Time: 6/25/2018 12:43 PM  Performed by: Mary Garcia  Authorized by: Mary Garcia     Procedure details: simple removal of a foreign body, stone, or stent    Patient tolerance: Patient tolerated the procedure well with no immediate complications    Additional Procedure Details: Patient was brought to the procedure room and placed supine  He was prepped and draped in the usual sterile fashion  2% xylocaine jelly was administered to the urethra     While we were letting the local work, the patient's x-rays were reviewed with him on the [facility] system  The flexible cystoscope was then introduced  The bladder was unremarkable  The stent string was grasped with a grasper and the stent removed easily  The patient tolerated the procedure well  He voided at the conclusion the procedure  Discharge instructions were given  He is on antibiotic and will call for fever, chills or severe pain  He will follow up in 6 weeks  In approximately 1 month we will obtain a renal ultrasound and KUB

## 2018-06-25 NOTE — PATIENT INSTRUCTIONS

## 2018-06-27 LAB
BACTERIA BLD CULT: NORMAL
BACTERIA BLD CULT: NORMAL

## 2018-08-06 ENCOUNTER — HOSPITAL ENCOUNTER (OUTPATIENT)
Dept: RADIOLOGY | Facility: HOSPITAL | Age: 68
Discharge: HOME | End: 2018-08-06
Attending: UROLOGY
Payer: COMMERCIAL

## 2018-08-06 DIAGNOSIS — N13.2 URETERAL STONE WITH HYDRONEPHROSIS: ICD-10-CM

## 2018-08-06 PROCEDURE — 74018 RADEX ABDOMEN 1 VIEW: CPT

## 2018-08-09 ENCOUNTER — HOSPITAL ENCOUNTER (OUTPATIENT)
Dept: ULTRASOUND IMAGING | Facility: HOSPITAL | Age: 68
Discharge: HOME | End: 2018-08-09
Attending: UROLOGY
Payer: COMMERCIAL

## 2018-08-09 DIAGNOSIS — N13.2 URETERAL STONE WITH HYDRONEPHROSIS: ICD-10-CM

## 2018-08-09 PROCEDURE — 76770 US EXAM ABDO BACK WALL COMP: CPT

## 2018-08-14 ENCOUNTER — OFFICE VISIT (OUTPATIENT)
Dept: UROLOGY | Facility: MEDICAL CENTER | Age: 68
End: 2018-08-14
Payer: COMMERCIAL

## 2018-08-14 VITALS
HEIGHT: 68 IN | SYSTOLIC BLOOD PRESSURE: 134 MMHG | DIASTOLIC BLOOD PRESSURE: 62 MMHG | BODY MASS INDEX: 25.16 KG/M2 | WEIGHT: 166 LBS

## 2018-08-14 DIAGNOSIS — N13.2 URETERAL STONE WITH HYDRONEPHROSIS: Primary | ICD-10-CM

## 2018-08-14 DIAGNOSIS — N13.8 BPH WITH OBSTRUCTION/LOWER URINARY TRACT SYMPTOMS: ICD-10-CM

## 2018-08-14 DIAGNOSIS — R97.20 ELEVATED PSA: ICD-10-CM

## 2018-08-14 DIAGNOSIS — N40.1 BPH WITH OBSTRUCTION/LOWER URINARY TRACT SYMPTOMS: ICD-10-CM

## 2018-08-14 LAB
SL AMB  POCT GLUCOSE, UA: NORMAL
SL AMB LEUKOCYTE ESTERASE,UA: NORMAL
SL AMB POCT BILIRUBIN,UA: NORMAL
SL AMB POCT BLOOD,UA: NORMAL
SL AMB POCT CLARITY,UA: CLEAR
SL AMB POCT COLOR,UA: YELLOW
SL AMB POCT KETONES,UA: NORMAL
SL AMB POCT NITRITE,UA: NORMAL
SL AMB POCT PH,UA: 5.5
SL AMB POCT SPECIFIC GRAVITY,UA: 1.01
SL AMB POCT URINE PROTEIN: NORMAL
SL AMB POCT UROBILINOGEN: 0.2

## 2018-08-14 PROCEDURE — 99213 OFFICE O/P EST LOW 20 MIN: CPT | Performed by: UROLOGY

## 2018-08-14 PROCEDURE — 81003 URINALYSIS AUTO W/O SCOPE: CPT | Performed by: UROLOGY

## 2018-08-14 PROCEDURE — 4040F PNEUMOC VAC/ADMIN/RCVD: CPT | Performed by: UROLOGY

## 2018-08-14 NOTE — LETTER
August 14, 2018     Carmela Olivares DO  [address] [facility] [address]   [address]  [id number]    Patient: Jose Maria Carrero   YOB: 1950   Date of Visit: 8/14/2018       Dear Dr Dk Ortez: Thank you for referring Jose Maria Carrero to me for evaluation  Below are my notes for this consultation  If you have questions, please do not hesitate to call me  I look forward to following your patient along with you           Sincerely,        Jeffrey Ivan MD        CC: No Recipients

## 2018-08-14 NOTE — PATIENT INSTRUCTIONS
Kidney Stones   WHAT YOU NEED TO KNOW:   What is a kidney stone? Kidney stones form in the urinary system when the water and waste in your urine are out of balance  When this happens, certain types of waste crystals separate from the urine  The crystals build up and form kidney stones  Kidney stones can be made of uric acid, calcium, phosphate, or oxalate crystals  You may have 1 or more kidney stones  What increases my risk for kidney stones? · You do not drink enough liquids (especially water) each day  · You have urinary tract infections often  · You follow a certain type of diet  For example, people who eat a diet high in meat or salt may be at higher risk for kidney stones  People who eat foods high in oxalate may also be at higher risk  Foods that are high in oxalate include nuts, chocolate, coffee, and green leafy vegetables  · You take certain medicines such as diuretics, steroids, and antacids  · A family member has had kidney stones  · You were born with a kidney or bowel disorder, or you have other medical problems such as gout  What are the signs and symptoms of kidney stones? · Pain in the middle of your back that moves across to your side or that may spread to your groin    · Nausea and vomiting    · Urge to urinate often, burning feeling when you urinate, or pink or red urine    · Tenderness in your lower back, side, or stomach  How are kidney stones diagnosed? Your healthcare provider will ask about your health, diet, and lifestyle  He may refer you to a urologist  Ariadna Gonzales may need tests to find out what type of kidney stones you have  Tests can show the size of your kidney stones and where they are in your urinary system  You may have one or more of the following:  · Urine tests  may show if you have blood in your urine  They may also show high amounts of the substances that form kidney stones, such as uric acid  · Blood tests  show how well your kidneys are working   They may also be used to check the levels of calcium or uric acid in your blood  · A noncontrast helical CT scan  is a type of x-ray that uses a computer to take pictures of your kidneys  Healthcare providers use the pictures to check for kidney stones or other problems  · X-rays  of your kidneys, bladder, and ureters may be done  You may be given a dye before the pictures are taken to help healthcare providers see the pictures better  You may need to have more than one x-ray  Tell the healthcare provider if you have ever had an allergic reaction to contrast dye  · An abdominal ultrasound  uses sound waves to show pictures of your abdomen on a monitor  How are kidney stones treated? · NSAIDs , such as ibuprofen, help decrease swelling, pain, and fever  This medicine is available with or without a doctor's order  NSAIDs can cause stomach bleeding or kidney problems in certain people  If you take blood thinner medicine, always ask your healthcare provider if NSAIDs are safe for you  Always read the medicine label and follow directions  · Prescription medicine  may be given  Ask how to take this medicine safely  · Medicines  to balance your electrolytes may be needed  · A procedure or surgery  to remove the kidney stones may be needed if they do not pass on their own  Your treatment will depend on the size and location of your kidney stones  How can I manage my symptoms? · Drink plenty of liquids  Your healthcare provider may tell you to drink at least 8 to 12 (eight-ounce) cups of liquids each day  This helps flush out the kidney stones when you urinate  Water is the best liquid to drink  · Strain your urine every time you go to the bathroom  Urinate through a strainer or a piece of thin cloth to catch the stones  Take the stones to your healthcare provider so they can be sent to the lab for tests  This will help your healthcare providers plan the best treatment for you             · Eat a variety of healthy foods  Healthy foods include fruits, vegetables, whole-grain breads, low-fat dairy products, beans, and fish  You may need to limit how much sodium (salt) or protein you eat  Ask for information about the best foods for you  · Exercise regularly  Your stones may pass more easily if you stay active  Ask about the best activities for you  When should I seek immediate care? · You have vomiting that is not relieved by medicine  When should I contact my healthcare provider? · You have a fever  · You have trouble passing urine  · You see blood in your urine  · You have severe pain  · You have any questions or concerns about your condition or care  CARE AGREEMENT:   You have the right to help plan your care  Learn about your health condition and how it may be treated  Discuss treatment options with your caregivers to decide what care you want to receive  You always have the right to refuse treatment  The above information is an  only  It is not intended as medical advice for individual conditions or treatments  Talk to your doctor, nurse or pharmacist before following any medical regimen to see if it is safe and effective for you  © 2017 [facility] Information is for End User's use only and may not be sold, redistributed or otherwise used for commercial purposes  All illustrations and images included in CareNotes® are the copyrighted property of A D A M , Inc  or Josiah Lizarraga

## 2018-08-14 NOTE — ASSESSMENT & PLAN NOTE
Likely related to urinary tract infection  His PSA had been improving on antibiotics  His urinalysis is now negative and he has not had a recent infection  We will recheck PSA in October  He will return in 6 months for follow-up

## 2018-08-14 NOTE — ASSESSMENT & PLAN NOTE
KUB questions the presence of a small 3 mm stone in the area the right ureter  Renal ultrasound does not reveal hydronephrosis  The patient is asymptomatic and urinalysis is negative  In all likelihood if this is a stone it should pass spontaneously

## 2018-08-14 NOTE — PROGRESS NOTES
IPSS Questionnaire (AUA-7): Over the past month    1)  How often have you had a sensation of not emptying your bladder completely after you finish urinating? 1 - Less than 1 time in 5   2)  How often have you had to urinate again less than two hours after you finished urinating? 1 - Less than 1 time in 5   3)  How often have you found you stopped and started again several times when you urinated? 1 - Less than 1 time in 5   4) How difficult have you found it to postpone urination? 1 - Less than 1 time in 5   5) How often have you had a weak urinary stream?  1 - Less than 1 time in 5   6) How often have you had to push or strain to begin urination? 0 - Not at all   7) How many times did you most typically get up to urinate from the time you went to bed until the time you got up in the morning?   2 - 2 times   Total Score:  7       QOL: Mostly Satisfied

## 2018-08-14 NOTE — ASSESSMENT & PLAN NOTE
The patient remains on tamsulosin  His AUA symptom score is 7  His prostatic fossa was open on recent cystoscopy and there was no evidence of bladder neck contracture noted  He will try to discontinue tamsulosin

## 2018-08-14 NOTE — PROGRESS NOTES
Assessment/Plan:    Ureteral stone with hydronephrosis  KUB questions the presence of a small 3 mm stone in the area the right ureter  Renal ultrasound does not reveal hydronephrosis  The patient is asymptomatic and urinalysis is negative  In all likelihood if this is a stone it should pass spontaneously  Benign localized hyperplasia of prostate with urinary obstruction and lower urinary tract symptoms  The patient remains on tamsulosin  His AUA symptom score is 7  His prostatic fossa was open on recent cystoscopy and there was no evidence of bladder neck contracture noted  He will try to discontinue tamsulosin  Elevated PSA  Likely related to urinary tract infection  His PSA had been improving on antibiotics  His urinalysis is now negative and he has not had a recent infection  We will recheck PSA in October  He will return in 6 months for follow-up  Diagnoses and all orders for this visit:    Ureteral stone with hydronephrosis  -     POCT urine dip auto non-scope    Elevated PSA  -     PSA, total and free; Future    BPH with obstruction/lower urinary tract symptoms          Subjective:      Patient ID: Vielka Ma is a 77 y o  male  77-year-old male followed for lower tract symptoms, a history of PSA elevation and recently for kidney stones  He underwent ureteroscopy and laser lithotripsy on June 7th  His stent was removed several weeks later  He reports since that time he is voiding well  He has no flank pain  He has not had any recurrent infections  He denies gross hematuria, dysuria or symptoms of infection  He remains on tamsulosin for his lower tract symptoms          The following portions of the patient's history were reviewed and updated as appropriate: allergies, current medications, past family history, past medical history, past social history, past surgical history and problem list     Review of Systems   Constitutional: Negative for chills, diaphoresis, fatigue and fever    HENT: Negative  Eyes: Negative  Respiratory: Negative  Cardiovascular: Negative  Endocrine: Negative  Musculoskeletal: Negative  Skin: Negative  Allergic/Immunologic: Negative  Neurological: Negative  Hematological: Negative  Psychiatric/Behavioral: Negative  Objective:      /62 (BP Location: Left arm, Patient Position: Sitting)   Ht 5' 8" (1 727 m)   Wt 75 3 kg (166 lb)   BMI 25 24 kg/m²          Physical Exam   Constitutional: He is oriented to person, place, and time  He appears well-developed and well-nourished  HENT:   Head: Normocephalic and atraumatic  Eyes: Conjunctivae are normal    Neck: Neck supple  Cardiovascular: Normal rate  Pulmonary/Chest: Effort normal    Abdominal: He exhibits no distension and no mass  There is no tenderness  There is no rebound and no guarding  No hernia   Genitourinary: Penis normal    Genitourinary Comments: Testes descended and normal to palpation   Musculoskeletal:   No CVAT   Neurological: He is alert and oriented to person, place, and time  Skin: Skin is warm and dry  Psychiatric: He has a normal mood and affect   His behavior is normal  Judgment and thought content normal

## 2018-11-09 ENCOUNTER — TELEPHONE (OUTPATIENT)
Dept: UROLOGY | Facility: MEDICAL CENTER | Age: 68
End: 2018-11-09

## 2018-11-09 NOTE — TELEPHONE ENCOUNTER
Received [facility]/[employer] year end audit medical records request for 1/1/2017 - present  Records uploaded to their system 11/9/2018

## 2018-11-26 ENCOUNTER — TRANSCRIBE ORDERS (OUTPATIENT)
Dept: LAB | Facility: OTHER | Age: 68
End: 2018-11-26

## 2018-11-26 ENCOUNTER — LAB (OUTPATIENT)
Dept: LAB | Facility: OTHER | Age: 68
End: 2018-11-26
Payer: COMMERCIAL

## 2018-11-26 DIAGNOSIS — R97.20 ELEVATED PSA: ICD-10-CM

## 2018-11-26 PROCEDURE — 84154 ASSAY OF PSA FREE: CPT

## 2018-11-26 PROCEDURE — 36415 COLL VENOUS BLD VENIPUNCTURE: CPT

## 2018-11-26 PROCEDURE — 84153 ASSAY OF PSA TOTAL: CPT

## 2018-11-27 LAB
PSA FREE MFR SERPL: 35.8 %
PSA FREE SERPL-MCNC: 1.11 NG/ML
PSA SERPL-MCNC: 3.1 NG/ML (ref 0–4)

## 2018-11-30 ENCOUNTER — TELEPHONE (OUTPATIENT)
Dept: UROLOGY | Facility: MEDICAL CENTER | Age: 68
End: 2018-11-30

## 2018-11-30 NOTE — TELEPHONE ENCOUNTER
----- Message from Isabela Haley MD sent at 11/29/2018  4:42 PM EST -----  Inform pt that the  test was normal  Keep usual follow up appt    PSA level was normal at 3 1

## 2018-12-11 DIAGNOSIS — N13.9 BENIGN LOCALIZED HYPERPLASIA OF PROSTATE WITH URINARY OBSTRUCTION AND LOWER URINARY TRACT SYMPTOMS: Primary | ICD-10-CM

## 2018-12-11 DIAGNOSIS — N40.1 BENIGN LOCALIZED HYPERPLASIA OF PROSTATE WITH URINARY OBSTRUCTION AND LOWER URINARY TRACT SYMPTOMS: Primary | ICD-10-CM

## 2018-12-11 NOTE — TELEPHONE ENCOUNTER
Patient called requesting refill on Tamsulosin 0 4mg  Patient has an upcoming office visit scheduled for 2/19/19 and will run out of medication until then    Request for same, 90 day supply with NO refills was queued and forwarded to Dr Jr Flores for approval

## 2018-12-13 RX ORDER — TAMSULOSIN HYDROCHLORIDE 0.4 MG/1
0.4 CAPSULE ORAL
Qty: 90 CAPSULE | Refills: 0 | Status: SHIPPED | OUTPATIENT
Start: 2018-12-13 | End: 2019-03-20 | Stop reason: SDUPTHER

## 2019-02-19 ENCOUNTER — OFFICE VISIT (OUTPATIENT)
Dept: UROLOGY | Facility: MEDICAL CENTER | Age: 69
End: 2019-02-19
Payer: COMMERCIAL

## 2019-02-19 VITALS
SYSTOLIC BLOOD PRESSURE: 128 MMHG | DIASTOLIC BLOOD PRESSURE: 60 MMHG | WEIGHT: 170 LBS | HEIGHT: 68 IN | HEART RATE: 85 BPM | BODY MASS INDEX: 25.76 KG/M2

## 2019-02-19 DIAGNOSIS — N13.9 BENIGN LOCALIZED HYPERPLASIA OF PROSTATE WITH URINARY OBSTRUCTION AND LOWER URINARY TRACT SYMPTOMS: Primary | ICD-10-CM

## 2019-02-19 DIAGNOSIS — N40.1 BENIGN LOCALIZED HYPERPLASIA OF PROSTATE WITH URINARY OBSTRUCTION AND LOWER URINARY TRACT SYMPTOMS: Primary | ICD-10-CM

## 2019-02-19 DIAGNOSIS — N13.2 URETERAL STONE WITH HYDRONEPHROSIS: ICD-10-CM

## 2019-02-19 DIAGNOSIS — R97.20 ELEVATED PSA: ICD-10-CM

## 2019-02-19 PROCEDURE — 99214 OFFICE O/P EST MOD 30 MIN: CPT | Performed by: UROLOGY

## 2019-02-19 PROCEDURE — 81003 URINALYSIS AUTO W/O SCOPE: CPT | Performed by: UROLOGY

## 2019-02-19 RX ORDER — GABAPENTIN 100 MG/1
CAPSULE ORAL
COMMUNITY
Start: 2019-02-07

## 2019-02-19 RX ORDER — LEVOFLOXACIN 500 MG/1
500 TABLET, FILM COATED ORAL EVERY 24 HOURS
Qty: 7 TABLET | Refills: 0 | Status: SHIPPED | OUTPATIENT
Start: 2019-02-19 | End: 2019-02-26

## 2019-02-19 NOTE — ASSESSMENT & PLAN NOTE
KUB had questions the presence of a 3 mm calcification in the area of the right ureter  We will repeat a KUB at this time to assess whether the calcification persists  The patient does not feel like he has a stone passing at this time  Urinalysis does reveal positive leukocytes  We will check a urine culture and treat empirically with Levaquin 500 mg daily x1 week  He will call for increasing pain or fever

## 2019-02-19 NOTE — LETTER
February 19, 2019     Lisa Cardoso DO  [address]    Patient: Tai Riggs   YOB: 1950   Date of Visit: 2/19/2019       Dear Dr Lindsey Salgado: Thank you for referring Tai Riggs to me for evaluation  Below are my notes for this consultation  If you have questions, please do not hesitate to call me  I look forward to following your patient along with you  Sincerely,        Joceline Hardy MD        CC: No Recipients  Joceline Hardy MD  2/19/2019  1:35 PM  Sign at close encounter  Assessment/Plan:    Ureteral stone with hydronephrosis  KUB had questions the presence of a 3 mm calcification in the area of the right ureter  We will repeat a KUB at this time to assess whether the calcification persists  The patient does not feel like he has a stone passing at this time  Urinalysis does reveal positive leukocytes  We will check a urine culture and treat empirically with Levaquin 500 mg daily x1 week  He will call for increasing pain or fever  BPH with obstruction/lower urinary tract symptoms  AUA symptom score is 7  He is satisfied with his voiding pattern at this time  Cystoscopy documents an open prostatic fossa  We will continue to follow  Elevated PSA  Most recent PSA had normalized at 3 1  (November 2018)  We will plan to recheck a PSA next November  Diagnoses and all orders for this visit:    Benign localized hyperplasia of prostate with urinary obstruction and lower urinary tract symptoms  -     POCT urine dip auto non-scope  -     PSA Total, Diagnostic; Future    Ureteral stone with hydronephrosis  -     Urine culture; Future  -     XR abdomen 1 view kub; Future  -     levofloxacin (LEVAQUIN) 500 mg tablet; Take 1 tablet (500 mg total) by mouth every 24 hours for 7 days    Elevated PSA          Subjective:      Patient ID: Tai Riggs is a 77 y o  male      Chief complaint:  Lower tract symptoms and history of kidney stones    HPI:  64-year-old male followed for the above complaints  He notes he has generally been voiding well  His stream is adequate and he feels he empties his bladder  For the last several weeks he does note mild right lower quadrant discomfort  This is not described as pain  He notes his urine has been foul smelling  He is concerned he is a urinary infection  He denies gross hematuria, dysuria or other symptoms of infection  He has no flank pain  There is no fever  The following portions of the patient's history were reviewed and updated as appropriate: allergies, current medications, past family history, past medical history, past social history, past surgical history and problem list     Review of Systems   Constitutional: Negative for chills, diaphoresis, fatigue and fever  HENT: Negative  Eyes: Negative  Respiratory: Negative  Cardiovascular: Negative  Gastrointestinal: Negative  RLQ discomfort     Endocrine: Negative  Genitourinary:        See HPI   Musculoskeletal: Negative  Skin: Negative  Allergic/Immunologic: Negative  Neurological: Negative  Hematological: Negative  Psychiatric/Behavioral: Negative  AUA SYMPTOM SCORE      Most Recent Value   AUA SYMPTOM SCORE   How often have you had a sensation of not emptying your bladder completely after you finished urinating? 1   How often have you had to urinate again less than two hours after you finished urinating? 1   How often have you found you stopped and started again several times when you urinate? 1   How often have you found it difficult to postpone urination? 1   How often have you had a weak urinary stream?  1   How often have you had to push or strain to begin urination? 0   How many times did you most typically get up to urinate from the time you went to bed at night until the time you got up in the morning?   2   Quality of Life: If you were to spend the rest of your life with your urinary condition just the way it is now, how would you feel about that?  3   AUA SYMPTOM SCORE  7        Objective:      /60 (BP Location: Left arm, Patient Position: Sitting, Cuff Size: Large)   Pulse 85   Ht 5' 8" (1 727 m)   Wt 77 1 kg (170 lb)   BMI 25 85 kg/m²           Physical Exam   Constitutional: He is oriented to person, place, and time  He appears well-developed and well-nourished  HENT:   Head: Normocephalic and atraumatic  Eyes: Conjunctivae are normal    Neck: Neck supple  Cardiovascular: Normal rate  Pulmonary/Chest: Effort normal    Abdominal: Soft  Bowel sounds are normal  He exhibits no distension and no mass  There is no tenderness  There is no rebound, no guarding and no CVA tenderness  No hernia  Genitourinary: Rectum normal, testes normal and penis normal  Right testis shows no mass  Left testis shows no mass  No phimosis or hypospadias  Genitourinary Comments: Prostate moderately enlarged and palpably benign  The prostate is not tender  There is no induration  Musculoskeletal: He exhibits no edema  Neurological: He is alert and oriented to person, place, and time  Skin: Skin is warm and dry  Psychiatric: He has a normal mood and affect  His behavior is normal  Judgment and thought content normal    Vitals reviewed

## 2019-02-19 NOTE — ASSESSMENT & PLAN NOTE
AUA symptom score is 7  He is satisfied with his voiding pattern at this time  Cystoscopy documents an open prostatic fossa  We will continue to follow

## 2019-02-19 NOTE — ASSESSMENT & PLAN NOTE
Most recent PSA had normalized at 3 1  (November 2018)  We will plan to recheck a PSA next November

## 2019-02-19 NOTE — LETTER
February 19, 2019     Tonia DO Neyda  [address] [employer]    Patient: Ivan Gonzales   YOB: 1950   Date of Visit: 2/19/2019       Dear Dr Sharlene Olivarez: Thank you for referring Ivan Gonzales to me for evaluation  Below are my notes for this consultation  If you have questions, please do not hesitate to call me  I look forward to following your patient along with you  Sincerely,        Saeid Knapp MD        CC: No Recipients  Saeid Knapp MD  2/19/2019  1:34 PM  Incomplete  Assessment/Plan:    Ureteral stone with hydronephrosis  KUB had questions the presence of a 3 mm calcification in the area of the right ureter  We will repeat a KUB at this time to assess whether the calcification persists  The patient does not feel like he has a stone passing at this time  Urinalysis does reveal positive leukocytes  We will check a urine culture and treat empirically with Levaquin 500 mg daily x1 week  He will call for increasing pain or fever  BPH with obstruction/lower urinary tract symptoms  AUA symptom score is 7  He is satisfied with his voiding pattern at this time  Cystoscopy documents an open prostatic fossa  We will continue to follow  Elevated PSA  Most recent PSA had normalized at 3 1  (November 2018)  We will plan to recheck a PSA next November  Diagnoses and all orders for this visit:    Benign localized hyperplasia of prostate with urinary obstruction and lower urinary tract symptoms  -     POCT urine dip auto non-scope  -     PSA Total, Diagnostic; Future    Ureteral stone with hydronephrosis  -     Urine culture; Future  -     XR abdomen 1 view kub; Future  -     levofloxacin (LEVAQUIN) 500 mg tablet; Take 1 tablet (500 mg total) by mouth every 24 hours for 7 days    Elevated PSA          Subjective:      Patient ID: Ivan Gonzales is a 77 y o  male      Chief complaint:  Lower tract symptoms and history of kidney stones    HPI:  23-year-old male followed for the above complaints  He notes he has generally been voiding well  His stream is adequate and he feels he empties his bladder  For the last several weeks he does note mild right lower quadrant discomfort  This is not described as pain  He notes his urine has been foul smelling  He is concerned he is a urinary infection  He denies gross hematuria, dysuria or other symptoms of infection  He has no flank pain  There is no fever  The following portions of the patient's history were reviewed and updated as appropriate: allergies, current medications, past family history, past medical history, past social history, past surgical history and problem list     Review of Systems   Constitutional: Negative for chills, diaphoresis, fatigue and fever  HENT: Negative  Eyes: Negative  Respiratory: Negative  Cardiovascular: Negative  Gastrointestinal: Negative  RLQ discomfort     Endocrine: Negative  Genitourinary:        See HPI   Musculoskeletal: Negative  Skin: Negative  Allergic/Immunologic: Negative  Neurological: Negative  Hematological: Negative  Psychiatric/Behavioral: Negative  AUA SYMPTOM SCORE      Most Recent Value   AUA SYMPTOM SCORE   How often have you had a sensation of not emptying your bladder completely after you finished urinating? 1   How often have you had to urinate again less than two hours after you finished urinating? 1   How often have you found you stopped and started again several times when you urinate? 1   How often have you found it difficult to postpone urination? 1   How often have you had a weak urinary stream?  1   How often have you had to push or strain to begin urination? 0   How many times did you most typically get up to urinate from the time you went to bed at night until the time you got up in the morning?   2   Quality of Life: If you were to spend the rest of your life with your urinary condition just the way it is now, how would you feel about that?  3   AUA SYMPTOM SCORE  7        Objective:      /60 (BP Location: Left arm, Patient Position: Sitting, Cuff Size: Large)   Pulse 85   Ht 5' 8" (1 727 m)   Wt 77 1 kg (170 lb)   BMI 25 85 kg/m²           Physical Exam   Constitutional: He is oriented to person, place, and time  He appears well-developed and well-nourished  HENT:   Head: Normocephalic and atraumatic  Eyes: Conjunctivae are normal    Neck: Neck supple  Cardiovascular: Normal rate  Pulmonary/Chest: Effort normal    Abdominal: Soft  Bowel sounds are normal  He exhibits no distension and no mass  There is no tenderness  There is no rebound, no guarding and no CVA tenderness  No hernia  Genitourinary: Rectum normal, testes normal and penis normal  Right testis shows no mass  Left testis shows no mass  No phimosis or hypospadias  Genitourinary Comments: Prostate moderately enlarged and palpably benign  The prostate is not tender  There is no induration  Musculoskeletal: He exhibits no edema  Neurological: He is alert and oriented to person, place, and time  Skin: Skin is warm and dry  Psychiatric: He has a normal mood and affect  His behavior is normal  Judgment and thought content normal    Vitals reviewed  Sunita Huynh MD  2/19/2019  1:33 PM  Sign at close encounter  Assessment/Plan:    Ureteral stone with hydronephrosis  KUB had questions the presence of a 3 mm calcification in the area of the right ureter  We will repeat a KUB at this time to assess whether the calcification persists  The patient does not feel like he has a stone passing at this time  Urinalysis does reveal positive leukocytes  We will check a urine culture and treat empirically with Levaquin 500 mg daily x1 week  He will call for increasing pain or fever  BPH with obstruction/lower urinary tract symptoms  AUA symptom score is 7   He is satisfied with his voiding pattern at this time  Cystoscopy documents an open prostatic fossa  We will continue to follow  Elevated PSA  Most recent PSA had normalized at 3 1  (November 2018)  We will plan to recheck a PSA next November  Diagnoses and all orders for this visit:    Benign localized hyperplasia of prostate with urinary obstruction and lower urinary tract symptoms  -     POCT urine dip auto non-scope  -     PSA Total, Diagnostic; Future    Ureteral stone with hydronephrosis  -     Urine culture; Future  -     XR abdomen 1 view kub; Future  -     levofloxacin (LEVAQUIN) 500 mg tablet; Take 1 tablet (500 mg total) by mouth every 24 hours for 7 days    Elevated PSA          Subjective:      Patient ID: Issac Vargas is a 77 y o  male  Chief complaint:  Lower tract symptoms and history of kidney stones    HPI:  68-year-old male followed for the above complaints  He notes he has generally been voiding well  His stream is adequate and he feels he empties his bladder  For the last several weeks he does note mild right lower quadrant discomfort  This is not described as pain  He notes his urine has been foul smelling  He is concerned he is a urinary infection  He denies gross hematuria, dysuria or other symptoms of infection  He has no flank pain  There is no fever  The following portions of the patient's history were reviewed and updated as appropriate: allergies, current medications, past family history, past medical history, past social history, past surgical history and problem list     Review of Systems   Constitutional: Negative for chills, diaphoresis, fatigue and fever  HENT: Negative  Eyes: Negative  Respiratory: Negative  Cardiovascular: Negative  Gastrointestinal: Negative  RLQ discomfort     Endocrine: Negative  Genitourinary:        See HPI   Musculoskeletal: Negative  Skin: Negative  Allergic/Immunologic: Negative  Neurological: Negative      Hematological: Negative  Psychiatric/Behavioral: Negative  AUA SYMPTOM SCORE      Most Recent Value   AUA SYMPTOM SCORE   How often have you had a sensation of not emptying your bladder completely after you finished urinating? 1   How often have you had to urinate again less than two hours after you finished urinating? 1   How often have you found you stopped and started again several times when you urinate? 1   How often have you found it difficult to postpone urination? 1   How often have you had a weak urinary stream?  1   How often have you had to push or strain to begin urination? 0   How many times did you most typically get up to urinate from the time you went to bed at night until the time you got up in the morning? 2   Quality of Life: If you were to spend the rest of your life with your urinary condition just the way it is now, how would you feel about that?  3   AUA SYMPTOM SCORE  7        Objective:      /60 (BP Location: Left arm, Patient Position: Sitting, Cuff Size: Large)   Pulse 85   Ht 5' 8" (1 727 m)   Wt 77 1 kg (170 lb)   BMI 25 85 kg/m²           Physical Exam   Constitutional: He is oriented to person, place, and time  He appears well-developed and well-nourished  HENT:   Head: Normocephalic and atraumatic  Eyes: Conjunctivae are normal    Neck: Neck supple  Cardiovascular: Normal rate  Pulmonary/Chest: Effort normal    Abdominal: Soft  Bowel sounds are normal  He exhibits no distension and no mass  There is no tenderness  There is no rebound, no guarding and no CVA tenderness  No hernia  Genitourinary: Rectum normal, testes normal and penis normal  Right testis shows no mass  Left testis shows no mass  No phimosis or hypospadias  Genitourinary Comments: Prostate moderately enlarged and palpably benign  The prostate is not tender  There is no induration  Musculoskeletal: He exhibits no edema  Neurological: He is alert and oriented to person, place, and time  Skin: Skin is warm and dry  Psychiatric: He has a normal mood and affect  His behavior is normal  Judgment and thought content normal    Vitals reviewed  Dale Pang MD  2/19/2019  1:20 PM  Incomplete  Assessment/Plan:    No problem-specific Assessment & Plan notes found for this encounter  {Assess/PlanSmartLinks:39720}      Subjective:      Patient ID: Kranthi Cleaning is a 77 y o  male      HPI    {Common ambulatory SmartLinks:14471}    Review of Systems      Objective:      /60 (BP Location: Left arm, Patient Position: Sitting, Cuff Size: Large)   Pulse 85   Ht 5' 8" (1 727 m)   Wt 77 1 kg (170 lb)   BMI 25 85 kg/m²           Physical Exam

## 2019-02-19 NOTE — LETTER
February 19, 2019     Coleman Coughlin DO  [address]    Patient: Vikas Lira   YOB: 1950   Date of Visit: 2/19/2019       Dear Dr Mason Knox: Thank you for referring Vikas Lira to me for evaluation  Below are my notes for this consultation  If you have questions, please do not hesitate to call me  I look forward to following your patient along with you  Sincerely,        Estrella Hendrix MD        CC: No Recipients  Estrella Hendrix MD  2/19/2019  1:35 PM  Sign at close encounter  Assessment/Plan:    Ureteral stone with hydronephrosis  KUB had questions the presence of a 3 mm calcification in the area of the right ureter  We will repeat a KUB at this time to assess whether the calcification persists  The patient does not feel like he has a stone passing at this time  Urinalysis does reveal positive leukocytes  We will check a urine culture and treat empirically with Levaquin 500 mg daily x1 week  He will call for increasing pain or fever  BPH with obstruction/lower urinary tract symptoms  AUA symptom score is 7  He is satisfied with his voiding pattern at this time  Cystoscopy documents an open prostatic fossa  We will continue to follow  Elevated PSA  Most recent PSA had normalized at 3 1  (November 2018)  We will plan to recheck a PSA next November  Diagnoses and all orders for this visit:    Benign localized hyperplasia of prostate with urinary obstruction and lower urinary tract symptoms  -     POCT urine dip auto non-scope  -     PSA Total, Diagnostic; Future    Ureteral stone with hydronephrosis  -     Urine culture; Future  -     XR abdomen 1 view kub; Future  -     levofloxacin (LEVAQUIN) 500 mg tablet; Take 1 tablet (500 mg total) by mouth every 24 hours for 7 days    Elevated PSA          Subjective:      Patient ID: Vikas Lira is a 77 y o  male      Chief complaint:  Lower tract symptoms and history of kidney stones    HPI:  70-year-old male followed for the above complaints  He notes he has generally been voiding well  His stream is adequate and he feels he empties his bladder  For the last several weeks he does note mild right lower quadrant discomfort  This is not described as pain  He notes his urine has been foul smelling  He is concerned he is a urinary infection  He denies gross hematuria, dysuria or other symptoms of infection  He has no flank pain  There is no fever  The following portions of the patient's history were reviewed and updated as appropriate: allergies, current medications, past family history, past medical history, past social history, past surgical history and problem list     Review of Systems   Constitutional: Negative for chills, diaphoresis, fatigue and fever  HENT: Negative  Eyes: Negative  Respiratory: Negative  Cardiovascular: Negative  Gastrointestinal: Negative  RLQ discomfort     Endocrine: Negative  Genitourinary:        See HPI   Musculoskeletal: Negative  Skin: Negative  Allergic/Immunologic: Negative  Neurological: Negative  Hematological: Negative  Psychiatric/Behavioral: Negative  AUA SYMPTOM SCORE      Most Recent Value   AUA SYMPTOM SCORE   How often have you had a sensation of not emptying your bladder completely after you finished urinating? 1   How often have you had to urinate again less than two hours after you finished urinating? 1   How often have you found you stopped and started again several times when you urinate? 1   How often have you found it difficult to postpone urination? 1   How often have you had a weak urinary stream?  1   How often have you had to push or strain to begin urination? 0   How many times did you most typically get up to urinate from the time you went to bed at night until the time you got up in the morning?   2   Quality of Life: If you were to spend the rest of your life with your urinary condition just the way it is now, how would you feel about that?  3   AUA SYMPTOM SCORE  7        Objective:      /60 (BP Location: Left arm, Patient Position: Sitting, Cuff Size: Large)   Pulse 85   Ht 5' 8" (1 727 m)   Wt 77 1 kg (170 lb)   BMI 25 85 kg/m²           Physical Exam   Constitutional: He is oriented to person, place, and time  He appears well-developed and well-nourished  HENT:   Head: Normocephalic and atraumatic  Eyes: Conjunctivae are normal    Neck: Neck supple  Cardiovascular: Normal rate  Pulmonary/Chest: Effort normal    Abdominal: Soft  Bowel sounds are normal  He exhibits no distension and no mass  There is no tenderness  There is no rebound, no guarding and no CVA tenderness  No hernia  Genitourinary: Rectum normal, testes normal and penis normal  Right testis shows no mass  Left testis shows no mass  No phimosis or hypospadias  Genitourinary Comments: Prostate moderately enlarged and palpably benign  The prostate is not tender  There is no induration  Musculoskeletal: He exhibits no edema  Neurological: He is alert and oriented to person, place, and time  Skin: Skin is warm and dry  Psychiatric: He has a normal mood and affect  His behavior is normal  Judgment and thought content normal    Vitals reviewed  Dale Pang MD  2/19/2019  1:20 PM  Incomplete  Assessment/Plan:    No problem-specific Assessment & Plan notes found for this encounter  {Assess/PlanSmartLinks:02879}      Subjective:      Patient ID: Kranthi Cleaning is a 77 y o  male      HPI    {Common ambulatory SmartLinks:45117}    Review of Systems      Objective:      /60 (BP Location: Left arm, Patient Position: Sitting, Cuff Size: Large)   Pulse 85   Ht 5' 8" (1 727 m)   Wt 77 1 kg (170 lb)   BMI 25 85 kg/m²           Physical Exam

## 2019-02-19 NOTE — PATIENT INSTRUCTIONS
Kidney Stones   WHAT YOU NEED TO KNOW:   What is a kidney stone? Kidney stones form in the urinary system when the water and waste in your urine are out of balance  When this happens, certain types of waste crystals separate from the urine  The crystals build up and form kidney stones  Kidney stones can be made of uric acid, calcium, phosphate, or oxalate crystals  You may have 1 or more kidney stones  What increases my risk for kidney stones? · You do not drink enough liquids (especially water) each day  · You have urinary tract infections often  · You follow a certain type of diet  For example, people who eat a diet high in meat or salt may be at higher risk for kidney stones  People who eat foods high in oxalate may also be at higher risk  Foods that are high in oxalate include nuts, chocolate, coffee, and green leafy vegetables  · You take certain medicines such as diuretics, steroids, and antacids  · A family member has had kidney stones  · You were born with a kidney or bowel disorder, or you have other medical problems such as gout  What are the signs and symptoms of kidney stones? · Pain in the middle of your back that moves across to your side or that may spread to your groin    · Nausea and vomiting    · Urge to urinate often, burning feeling when you urinate, or pink or red urine    · Tenderness in your lower back, side, or stomach  How are kidney stones diagnosed? Your healthcare provider will ask about your health, diet, and lifestyle  He may refer you to a urologist  Ge[place] may need tests to find out what type of kidney stones you have  Tests can show the size of your kidney stones and where they are in your urinary system  You may have one or more of the following:  · Urine tests  may show if you have blood in your urine  They may also show high amounts of the substances that form kidney stones, such as uric acid  · Blood tests  show how well your kidneys are working   They may also be used to check the levels of calcium or uric acid in your blood  · A noncontrast helical CT scan  is a type of x-ray that uses a computer to take pictures of your kidneys  Healthcare providers use the pictures to check for kidney stones or other problems  · X-rays  of your kidneys, bladder, and ureters may be done  You may be given a dye before the pictures are taken to help healthcare providers see the pictures better  You may need to have more than one x-ray  Tell the healthcare provider if you have ever had an allergic reaction to contrast dye  · An abdominal ultrasound  uses sound waves to show pictures of your abdomen on a monitor  How are kidney stones treated? · NSAIDs , such as ibuprofen, help decrease swelling, pain, and fever  This medicine is available with or without a doctor's order  NSAIDs can cause stomach bleeding or kidney problems in certain people  If you take blood thinner medicine, always ask your healthcare provider if NSAIDs are safe for you  Always read the medicine label and follow directions  · Prescription medicine  may be given  Ask how to take this medicine safely  · Medicines  to balance your electrolytes may be needed  · A procedure or surgery  to remove the kidney stones may be needed if they do not pass on their own  Your treatment will depend on the size and location of your kidney stones  How can I manage my symptoms? · Drink plenty of liquids  Your healthcare provider may tell you to drink at least 8 to 12 (eight-ounce) cups of liquids each day  This helps flush out the kidney stones when you urinate  Water is the best liquid to drink  · Strain your urine every time you go to the bathroom  Urinate through a strainer or a piece of thin cloth to catch the stones  Take the stones to your healthcare provider so they can be sent to the lab for tests  This will help your healthcare providers plan the best treatment for you             · Eat a variety of healthy foods  Healthy foods include fruits, vegetables, whole-grain breads, low-fat dairy products, beans, and fish  You may need to limit how much sodium (salt) or protein you eat  Ask for information about the best foods for you  · Exercise regularly  Your stones may pass more easily if you stay active  Ask about the best activities for you  When should I seek immediate care? · You have vomiting that is not relieved by medicine  When should I contact my healthcare provider? · You have a fever  · You have trouble passing urine  · You see blood in your urine  · You have severe pain  · You have any questions or concerns about your condition or care  CARE AGREEMENT:   You have the right to help plan your care  Learn about your health condition and how it may be treated  Discuss treatment options with your caregivers to decide what care you want to receive  You always have the right to refuse treatment  The above information is an  only  It is not intended as medical advice for individual conditions or treatments  Talk to your doctor, nurse or pharmacist before following any medical regimen to see if it is safe and effective for you  © 2017 [employer] Information is for End User's use only and may not be sold, redistributed or otherwise used for commercial purposes  All illustrations and images included in CareNotes® are the copyrighted property of [employer] M [employer]  or Josiah Lizarraga

## 2019-02-19 NOTE — LETTER
February 19, 2019     Courtney Saul DO  [address] [employer]    Patient: Taurus Goff   YOB: 1950   Date of Visit: 2/19/2019       Dear Dr Pako Claudio: Thank you for referring Taurus Goff to me for evaluation  Below are my notes for this consultation  If you have questions, please do not hesitate to call me  I look forward to following your patient along with you  Sincerely,        Jacek Purcell MD        CC: No Recipients  Jacek Purcell MD  2/19/2019  1:33 PM  Sign at close encounter  Assessment/Plan:    Ureteral stone with hydronephrosis  KUB had questions the presence of a 3 mm calcification in the area of the right ureter  We will repeat a KUB at this time to assess whether the calcification persists  The patient does not feel like he has a stone passing at this time  Urinalysis does reveal positive leukocytes  We will check a urine culture and treat empirically with Levaquin 500 mg daily x1 week  He will call for increasing pain or fever  BPH with obstruction/lower urinary tract symptoms  AUA symptom score is 7  He is satisfied with his voiding pattern at this time  Cystoscopy documents an open prostatic fossa  We will continue to follow  Elevated PSA  Most recent PSA had normalized at 3 1  (November 2018)  We will plan to recheck a PSA next November  Diagnoses and all orders for this visit:    Benign localized hyperplasia of prostate with urinary obstruction and lower urinary tract symptoms  -     POCT urine dip auto non-scope  -     PSA Total, Diagnostic; Future    Ureteral stone with hydronephrosis  -     Urine culture; Future  -     XR abdomen 1 view kub; Future  -     levofloxacin (LEVAQUIN) 500 mg tablet; Take 1 tablet (500 mg total) by mouth every 24 hours for 7 days    Elevated PSA          Subjective:      Patient ID: Taurus Goff is a 77 y o  male      Chief complaint:  Lower tract symptoms and history of kidney stones    HPI:  24-year-old male followed for the above complaints  He notes he has generally been voiding well  His stream is adequate and he feels he empties his bladder  For the last several weeks he does note mild right lower quadrant discomfort  This is not described as pain  He notes his urine has been foul smelling  He is concerned he is a urinary infection  He denies gross hematuria, dysuria or other symptoms of infection  He has no flank pain  There is no fever  The following portions of the patient's history were reviewed and updated as appropriate: allergies, current medications, past family history, past medical history, past social history, past surgical history and problem list     Review of Systems   Constitutional: Negative for chills, diaphoresis, fatigue and fever  HENT: Negative  Eyes: Negative  Respiratory: Negative  Cardiovascular: Negative  Gastrointestinal: Negative  RLQ discomfort     Endocrine: Negative  Genitourinary:        See HPI   Musculoskeletal: Negative  Skin: Negative  Allergic/Immunologic: Negative  Neurological: Negative  Hematological: Negative  Psychiatric/Behavioral: Negative  AUA SYMPTOM SCORE      Most Recent Value   AUA SYMPTOM SCORE   How often have you had a sensation of not emptying your bladder completely after you finished urinating? 1   How often have you had to urinate again less than two hours after you finished urinating? 1   How often have you found you stopped and started again several times when you urinate? 1   How often have you found it difficult to postpone urination? 1   How often have you had a weak urinary stream?  1   How often have you had to push or strain to begin urination? 0   How many times did you most typically get up to urinate from the time you went to bed at night until the time you got up in the morning?   2   Quality of Life: If you were to spend the rest of your life with your urinary condition just the way it is now, how would you feel about that?  3   AUA SYMPTOM SCORE  7        Objective:      /60 (BP Location: Left arm, Patient Position: Sitting, Cuff Size: Large)   Pulse 85   Ht 5' 8" (1 727 m)   Wt 77 1 kg (170 lb)   BMI 25 85 kg/m²           Physical Exam   Constitutional: He is oriented to person, place, and time  He appears well-developed and well-nourished  HENT:   Head: Normocephalic and atraumatic  Eyes: Conjunctivae are normal    Neck: Neck supple  Cardiovascular: Normal rate  Pulmonary/Chest: Effort normal    Abdominal: Soft  Bowel sounds are normal  He exhibits no distension and no mass  There is no tenderness  There is no rebound, no guarding and no CVA tenderness  No hernia  Genitourinary: Rectum normal, testes normal and penis normal  Right testis shows no mass  Left testis shows no mass  No phimosis or hypospadias  Genitourinary Comments: Prostate moderately enlarged and palpably benign  The prostate is not tender  There is no induration  Musculoskeletal: He exhibits no edema  Neurological: He is alert and oriented to person, place, and time  Skin: Skin is warm and dry  Psychiatric: He has a normal mood and affect  His behavior is normal  Judgment and thought content normal    Vitals reviewed  Ana Hall MD  2/19/2019  1:20 PM  Incomplete  Assessment/Plan:    No problem-specific Assessment & Plan notes found for this encounter  {Assess/PlanSmartLinks:03202}      Subjective:      Patient ID: Leah Lemos is a 77 y o  male      HPI    {Common ambulatory SmartLinks:12678}    Review of Systems      Objective:      /60 (BP Location: Left arm, Patient Position: Sitting, Cuff Size: Large)   Pulse 85   Ht 5' 8" (1 727 m)   Wt 77 1 kg (170 lb)   BMI 25 85 kg/m²           Physical Exam

## 2019-02-19 NOTE — PROGRESS NOTES
Assessment/Plan:    Ureteral stone with hydronephrosis  KUB had questions the presence of a 3 mm calcification in the area of the right ureter  We will repeat a KUB at this time to assess whether the calcification persists  The patient does not feel like he has a stone passing at this time  Urinalysis does reveal positive leukocytes  We will check a urine culture and treat empirically with Levaquin 500 mg daily x1 week  He will call for increasing pain or fever  BPH with obstruction/lower urinary tract symptoms  AUA symptom score is 7  He is satisfied with his voiding pattern at this time  Cystoscopy documents an open prostatic fossa  We will continue to follow  Elevated PSA  Most recent PSA had normalized at 3 1  (November 2018)  We will plan to recheck a PSA next November  Diagnoses and all orders for this visit:    Benign localized hyperplasia of prostate with urinary obstruction and lower urinary tract symptoms  -     POCT urine dip auto non-scope  -     PSA Total, Diagnostic; Future    Ureteral stone with hydronephrosis  -     Urine culture; Future  -     XR abdomen 1 view kub; Future  -     levofloxacin (LEVAQUIN) 500 mg tablet; Take 1 tablet (500 mg total) by mouth every 24 hours for 7 days    Elevated PSA          Subjective:      Patient ID: Natasha Rahman is a 77 y o  male  Chief complaint:  Lower tract symptoms and history of kidney stones    HPI:  75-year-old male followed for the above complaints  He notes he has generally been voiding well  His stream is adequate and he feels he empties his bladder  For the last several weeks he does note mild right lower quadrant discomfort  This is not described as pain  He notes his urine has been foul smelling  He is concerned he is a urinary infection  He denies gross hematuria, dysuria or other symptoms of infection  He has no flank pain  There is no fever        The following portions of the patient's history were reviewed and updated as appropriate: allergies, current medications, past family history, past medical history, past social history, past surgical history and problem list     Review of Systems   Constitutional: Negative for chills, diaphoresis, fatigue and fever  HENT: Negative  Eyes: Negative  Respiratory: Negative  Cardiovascular: Negative  Gastrointestinal: Negative  RLQ discomfort     Endocrine: Negative  Genitourinary:        See HPI   Musculoskeletal: Negative  Skin: Negative  Allergic/Immunologic: Negative  Neurological: Negative  Hematological: Negative  Psychiatric/Behavioral: Negative  AUA SYMPTOM SCORE      Most Recent Value   AUA SYMPTOM SCORE   How often have you had a sensation of not emptying your bladder completely after you finished urinating? 1   How often have you had to urinate again less than two hours after you finished urinating? 1   How often have you found you stopped and started again several times when you urinate? 1   How often have you found it difficult to postpone urination? 1   How often have you had a weak urinary stream?  1   How often have you had to push or strain to begin urination? 0   How many times did you most typically get up to urinate from the time you went to bed at night until the time you got up in the morning? 2   Quality of Life: If you were to spend the rest of your life with your urinary condition just the way it is now, how would you feel about that?  3   AUA SYMPTOM SCORE  7        Objective:      /60 (BP Location: Left arm, Patient Position: Sitting, Cuff Size: Large)   Pulse 85   Ht 5' 8" (1 727 m)   Wt 77 1 kg (170 lb)   BMI 25 85 kg/m²          Physical Exam   Constitutional: He is oriented to person, place, and time  He appears well-developed and well-nourished  HENT:   Head: Normocephalic and atraumatic  Eyes: Conjunctivae are normal    Neck: Neck supple  Cardiovascular: Normal rate     Pulmonary/Chest: Effort normal    Abdominal: Soft  Bowel sounds are normal  He exhibits no distension and no mass  There is no tenderness  There is no rebound, no guarding and no CVA tenderness  No hernia  Genitourinary: Rectum normal, testes normal and penis normal  Right testis shows no mass  Left testis shows no mass  No phimosis or hypospadias  Genitourinary Comments: Prostate moderately enlarged and palpably benign  The prostate is not tender  There is no induration  Musculoskeletal: He exhibits no edema  Neurological: He is alert and oriented to person, place, and time  Skin: Skin is warm and dry  Psychiatric: He has a normal mood and affect  His behavior is normal  Judgment and thought content normal    Vitals reviewed

## 2019-03-20 DIAGNOSIS — N40.1 BENIGN LOCALIZED HYPERPLASIA OF PROSTATE WITH URINARY OBSTRUCTION AND LOWER URINARY TRACT SYMPTOMS: ICD-10-CM

## 2019-03-20 DIAGNOSIS — N13.9 BENIGN LOCALIZED HYPERPLASIA OF PROSTATE WITH URINARY OBSTRUCTION AND LOWER URINARY TRACT SYMPTOMS: ICD-10-CM

## 2019-03-20 RX ORDER — TAMSULOSIN HYDROCHLORIDE 0.4 MG/1
0.4 CAPSULE ORAL
Qty: 90 CAPSULE | Refills: 3 | Status: SHIPPED | OUTPATIENT
Start: 2019-03-20 | End: 2020-04-02 | Stop reason: SDUPTHER

## 2019-03-20 NOTE — TELEPHONE ENCOUNTER
Patient called requesting refill on Tamsulosin 0 4mg  Patient was recently seen in the office this past January, 2019; continuation of the medication was authorized at that time    Request for same, 90 day supply with 3 refills was queued and forwarded to ARLETH Singh for approval

## 2019-10-30 ENCOUNTER — TRANSCRIBE ORDERS (OUTPATIENT)
Dept: LAB | Facility: OTHER | Age: 69
End: 2019-10-30

## 2019-10-30 ENCOUNTER — APPOINTMENT (OUTPATIENT)
Dept: LAB | Facility: OTHER | Age: 69
End: 2019-10-30
Payer: COMMERCIAL

## 2019-10-30 DIAGNOSIS — N13.9 BENIGN LOCALIZED HYPERPLASIA OF PROSTATE WITH URINARY OBSTRUCTION AND LOWER URINARY TRACT SYMPTOMS: ICD-10-CM

## 2019-10-30 DIAGNOSIS — N40.1 BENIGN LOCALIZED HYPERPLASIA OF PROSTATE WITH URINARY OBSTRUCTION AND LOWER URINARY TRACT SYMPTOMS: ICD-10-CM

## 2019-10-30 LAB — PSA SERPL-MCNC: 2.7 NG/ML (ref 0–4)

## 2019-10-30 PROCEDURE — 84153 ASSAY OF PSA TOTAL: CPT

## 2019-10-30 PROCEDURE — 36415 COLL VENOUS BLD VENIPUNCTURE: CPT

## 2020-02-24 ENCOUNTER — HOSPITAL ENCOUNTER (OUTPATIENT)
Dept: RADIOLOGY | Facility: IMAGING CENTER | Age: 70
Discharge: HOME | End: 2020-02-24
Payer: COMMERCIAL

## 2020-02-24 ENCOUNTER — TRANSCRIBE ORDERS (OUTPATIENT)
Dept: ADMINISTRATIVE | Facility: HOSPITAL | Age: 70
End: 2020-02-24

## 2020-02-24 DIAGNOSIS — N13.2 URETERAL STONE WITH HYDRONEPHROSIS: ICD-10-CM

## 2020-02-24 DIAGNOSIS — N13.2 URETERAL STONE WITH HYDRONEPHROSIS: Primary | ICD-10-CM

## 2020-02-24 PROCEDURE — 74018 RADEX ABDOMEN 1 VIEW: CPT

## 2020-02-26 RX ORDER — NAPROXEN 500 MG/1
500 TABLET ORAL
COMMUNITY
Start: 2019-03-21 | End: 2020-03-20

## 2020-02-28 ENCOUNTER — OFFICE VISIT (OUTPATIENT)
Dept: UROLOGY | Facility: MEDICAL CENTER | Age: 70
End: 2020-02-28
Payer: COMMERCIAL

## 2020-02-28 VITALS
BODY MASS INDEX: 25.61 KG/M2 | HEART RATE: 77 BPM | SYSTOLIC BLOOD PRESSURE: 132 MMHG | DIASTOLIC BLOOD PRESSURE: 84 MMHG | HEIGHT: 68 IN | WEIGHT: 169 LBS

## 2020-02-28 DIAGNOSIS — N13.9 BENIGN LOCALIZED HYPERPLASIA OF PROSTATE WITH URINARY OBSTRUCTION AND LOWER URINARY TRACT SYMPTOMS: Primary | ICD-10-CM

## 2020-02-28 DIAGNOSIS — N40.1 BENIGN LOCALIZED HYPERPLASIA OF PROSTATE WITH URINARY OBSTRUCTION AND LOWER URINARY TRACT SYMPTOMS: Primary | ICD-10-CM

## 2020-02-28 DIAGNOSIS — N20.0 CALCULUS OF KIDNEY: ICD-10-CM

## 2020-02-28 PROBLEM — J11.1 INFLUENZA: Status: RESOLVED | Noted: 2018-01-20 | Resolved: 2020-02-28

## 2020-02-28 PROBLEM — E53.8 B12 DEFICIENCY: Status: ACTIVE | Noted: 2018-10-26

## 2020-02-28 PROBLEM — R22.31 MASS OF FINGER OF RIGHT HAND: Status: ACTIVE | Noted: 2019-03-21

## 2020-02-28 PROBLEM — N13.39 OTHER HYDRONEPHROSIS: Status: RESOLVED | Noted: 2018-05-22 | Resolved: 2020-02-28

## 2020-02-28 PROBLEM — R79.89 ELEVATED SERUM CREATININE: Status: RESOLVED | Noted: 2018-06-22 | Resolved: 2020-02-28

## 2020-02-28 PROBLEM — M79.641 RIGHT HAND PAIN: Status: ACTIVE | Noted: 2019-03-21

## 2020-02-28 PROBLEM — N39.0 COMPLICATED UTI (URINARY TRACT INFECTION): Status: RESOLVED | Noted: 2018-04-16 | Resolved: 2020-02-28

## 2020-02-28 PROBLEM — G60.8 PERIPHERAL SENSORY-MOTOR AXONAL POLYNEUROPATHY: Status: ACTIVE | Noted: 2019-01-07

## 2020-02-28 PROBLEM — N13.8 BPH WITH OBSTRUCTION/LOWER URINARY TRACT SYMPTOMS: Status: RESOLVED | Noted: 2017-04-27 | Resolved: 2020-02-28

## 2020-02-28 PROBLEM — R97.20 ELEVATED PSA: Status: RESOLVED | Noted: 2018-03-05 | Resolved: 2020-02-28

## 2020-02-28 LAB
SL AMB  POCT GLUCOSE, UA: ABNORMAL
SL AMB LEUKOCYTE ESTERASE,UA: ABNORMAL
SL AMB POCT BILIRUBIN,UA: ABNORMAL
SL AMB POCT BLOOD,UA: ABNORMAL
SL AMB POCT CLARITY,UA: CLEAR
SL AMB POCT COLOR,UA: YELLOW
SL AMB POCT KETONES,UA: ABNORMAL
SL AMB POCT NITRITE,UA: ABNORMAL
SL AMB POCT PH,UA: 5.5
SL AMB POCT SPECIFIC GRAVITY,UA: 1.02
SL AMB POCT URINE PROTEIN: ABNORMAL
SL AMB POCT UROBILINOGEN: 0.2

## 2020-02-28 PROCEDURE — 81003 URINALYSIS AUTO W/O SCOPE: CPT | Performed by: UROLOGY

## 2020-02-28 PROCEDURE — 99214 OFFICE O/P EST MOD 30 MIN: CPT | Performed by: UROLOGY

## 2020-02-28 NOTE — LETTER
February 28, 2020     Shanelle Ambriz, [address]    Patient: Dwight Garzon   YOB: 1950   Date of Visit: 2/28/2020       Dear Dr Erik Clarke: Thank you for referring Dwight Garzon to me for evaluation  Below are my notes for this consultation  If you have questions, please do not hesitate to call me  I look forward to following your patient along with you  Sincerely,        Errol Armstrong MD        CC: No Recipients  Errol Armstrong MD  2/28/2020 12:02 PM  Sign at close encounter  Assessment/Plan:    Benign localized hyperplasia of prostate with urinary obstruction and lower urinary tract symptoms  AUA symptom score is 17 on tamsulosin  The patient is now 3 years post transurethral resection of the prostate  Recent cystoscopy revealed an open bladder neck  Urinalysis is negative for blood  PSA in October 2019 was 2 7  Patient is mixed about his voiding pattern  After we discussed options he felt he was stable and did not wish to proceed with any further evaluation  We will continue follow him on present therapy  He will return in 1 year and will recheck a PSA in October 2020  Calculus of kidney  Bilateral nonobstructing stones were noted on ultrasound last year  KUB was reviewed on the [facility] system  I do not appreciate any definite stones at this time  The right kidney is obscured by bowel  Options were discussed and we will continue to follow  He will return in 1 year and we will consider repeat upper tract evaluation at that time  Diagnoses and all orders for this visit:    Benign localized hyperplasia of prostate with urinary obstruction and lower urinary tract symptoms  -     POCT urine dip auto non-scope  -     PSA Total, Diagnostic; Future    Calculus of kidney    Other orders  -     naproxen (NAPROSYN) 500 mg tablet; Take 500 mg by mouth          Subjective:      Patient ID: Dwight Garzon is a 65 y o  male     Benign Prostatic Hypertrophy   This is a chronic problem  The current episode started more than 1 year ago  The problem has been gradually worsening since onset  Irritative symptoms include nocturia ( nocturia x2)  Irritative symptoms do not include frequency or urgency  Obstructive symptoms include incomplete emptying ( occasional), an intermittent stream and a slower stream (Occasional)  Obstructive symptoms do not include dribbling, straining or a weak stream  Pertinent negatives include no chills, dysuria, genital pain, hematuria, hesitancy, nausea or vomiting  AUA score is 8-19  His sexual activity is non-contributory to the current illness  Nothing aggravates the symptoms  Past treatments include tamsulosin (He is s/p TURP 2017)  Kidney stones    He remains asymptomatic  He denies flank pain  There is no dysuria or symptoms of infection  He has not passed any stones over the last year  The following portions of the patient's history were reviewed and updated as appropriate: allergies, current medications, past family history, past medical history, past social history, past surgical history and problem list     Review of Systems   Constitutional: Negative for chills, diaphoresis, fatigue and fever  HENT: Negative  Eyes: Negative  Respiratory: Negative  Cardiovascular: Negative  Gastrointestinal: Negative  Negative for nausea and vomiting  Endocrine: Negative  Genitourinary: Positive for incomplete emptying ( occasional) and nocturia ( nocturia x2)  Negative for dysuria, frequency, hematuria, hesitancy and urgency  See HPI   Musculoskeletal: Positive for arthralgias  Skin: Negative  Allergic/Immunologic: Negative  Neurological: Positive for numbness  Hematological: Negative  Psychiatric/Behavioral: Negative            Objective:      /84 (BP Location: Left arm, Patient Position: Sitting, Cuff Size: Adult)   Pulse 77   Ht 5' 8" (1 727 m)   Wt 76 7 kg (169 lb)   BMI 25 70 kg/m²           Physical Exam   Constitutional: He is oriented to person, place, and time  He appears well-developed and well-nourished  HENT:   Head: Normocephalic and atraumatic  Eyes: Conjunctivae are normal    Neck: Neck supple  Cardiovascular: Normal rate  Pulmonary/Chest: Effort normal    Abdominal: Soft  Bowel sounds are normal  He exhibits no distension and no mass  There is no tenderness  There is no rebound, no guarding and no CVA tenderness  Genitourinary: Rectum normal, testes normal and penis normal  Right testis shows no mass  Left testis shows no mass  No phimosis or hypospadias  Genitourinary Comments: Prostate 2 X enlarged and palpably benign  Musculoskeletal: He exhibits no edema  Neurological: He is alert and oriented to person, place, and time  Skin: Skin is warm and dry  Psychiatric: He has a normal mood and affect  His behavior is normal  Judgment and thought content normal    Vitals reviewed

## 2020-02-28 NOTE — ASSESSMENT & PLAN NOTE
AUA symptom score is 17 on tamsulosin  The patient is now 3 years post transurethral resection of the prostate  Recent cystoscopy revealed an open bladder neck  Urinalysis is negative for blood  PSA in October 2019 was 2 7  Patient is mixed about his voiding pattern  After we discussed options he felt he was stable and did not wish to proceed with any further evaluation  We will continue follow him on present therapy  He will return in 1 year and will recheck a PSA in October 2020

## 2020-02-28 NOTE — PROGRESS NOTES
Assessment/Plan:    Benign localized hyperplasia of prostate with urinary obstruction and lower urinary tract symptoms  AUA symptom score is 17 on tamsulosin  The patient is now 3 years post transurethral resection of the prostate  Recent cystoscopy revealed an open bladder neck  Urinalysis is negative for blood  PSA in October 2019 was 2 7  Patient is mixed about his voiding pattern  After we discussed options he felt he was stable and did not wish to proceed with any further evaluation  We will continue follow him on present therapy  He will return in 1 year and will recheck a PSA in October 2020  Calculus of kidney  Bilateral nonobstructing stones were noted on ultrasound last year  KUB was reviewed on the [facility] system  I do not appreciate any definite stones at this time  The right kidney is obscured by bowel  Options were discussed and we will continue to follow  He will return in 1 year and we will consider repeat upper tract evaluation at that time  Diagnoses and all orders for this visit:    Benign localized hyperplasia of prostate with urinary obstruction and lower urinary tract symptoms  -     POCT urine dip auto non-scope  -     PSA Total, Diagnostic; Future    Calculus of kidney    Other orders  -     naproxen (NAPROSYN) 500 mg tablet; Take 500 mg by mouth          Subjective:      Patient ID: Sonali Coorna is a 65 y o  male  Benign Prostatic Hypertrophy   This is a chronic problem  The current episode started more than 1 year ago  The problem has been gradually worsening since onset  Irritative symptoms include nocturia ( nocturia x2)  Irritative symptoms do not include frequency or urgency  Obstructive symptoms include incomplete emptying ( occasional), an intermittent stream and a slower stream (Occasional)   Obstructive symptoms do not include dribbling, straining or a weak stream  Pertinent negatives include no chills, dysuria, genital pain, hematuria, hesitancy, nausea or vomiting  AUA score is 8-19  His sexual activity is non-contributory to the current illness  Nothing aggravates the symptoms  Past treatments include tamsulosin (He is s/p TURP 2017)  Kidney stones    He remains asymptomatic  He denies flank pain  There is no dysuria or symptoms of infection  He has not passed any stones over the last year  The following portions of the patient's history were reviewed and updated as appropriate: allergies, current medications, past family history, past medical history, past social history, past surgical history and problem list     Review of Systems   Constitutional: Negative for chills, diaphoresis, fatigue and fever  HENT: Negative  Eyes: Negative  Respiratory: Negative  Cardiovascular: Negative  Gastrointestinal: Negative  Negative for nausea and vomiting  Endocrine: Negative  Genitourinary: Positive for incomplete emptying ( occasional) and nocturia ( nocturia x2)  Negative for dysuria, frequency, hematuria, hesitancy and urgency  See HPI   Musculoskeletal: Positive for arthralgias  Skin: Negative  Allergic/Immunologic: Negative  Neurological: Positive for numbness  Hematological: Negative  Psychiatric/Behavioral: Negative  Objective:      /84 (BP Location: Left arm, Patient Position: Sitting, Cuff Size: Adult)   Pulse 77   Ht 5' 8" (1 727 m)   Wt 76 7 kg (169 lb)   BMI 25 70 kg/m²          Physical Exam   Constitutional: He is oriented to person, place, and time  He appears well-developed and well-nourished  HENT:   Head: Normocephalic and atraumatic  Eyes: Conjunctivae are normal    Neck: Neck supple  Cardiovascular: Normal rate  Pulmonary/Chest: Effort normal    Abdominal: Soft  Bowel sounds are normal  He exhibits no distension and no mass  There is no tenderness  There is no rebound, no guarding and no CVA tenderness     Genitourinary: Rectum normal, testes normal and penis normal  Right testis shows no mass  Left testis shows no mass  No phimosis or hypospadias  Genitourinary Comments: Prostate 2 X enlarged and palpably benign  Musculoskeletal: He exhibits no edema  Neurological: He is alert and oriented to person, place, and time  Skin: Skin is warm and dry  Psychiatric: He has a normal mood and affect  His behavior is normal  Judgment and thought content normal    Vitals reviewed

## 2020-02-28 NOTE — ASSESSMENT & PLAN NOTE
Bilateral nonobstructing stones were noted on ultrasound last year  KUB was reviewed on the [facility] system  I do not appreciate any definite stones at this time  The right kidney is obscured by bowel  Options were discussed and we will continue to follow  He will return in 1 year and we will consider repeat upper tract evaluation at that time

## 2020-02-28 NOTE — PATIENT INSTRUCTIONS
Benign Prostatic Hypertrophy   WHAT YOU NEED TO KNOW:   Benign prostatic hypertrophy (BPH) is a condition that causes your prostate gland to grow larger than normal  The prostate gland is the male sex gland that produces a fluid that is part of semen  It is about the size of a walnut and it is located under the bladder  As the prostate grows, it can squeeze the urethra  This can block urine flow and cause urinary problems  DISCHARGE INSTRUCTIONS:   Medicines:   · Alpha blockers: This medicine relaxes the muscles in your prostate and bladder  It may help you urinate more easily  · 5 alpha reductase inhibitors: These medicines block the production of a hormone that causes the prostate to get larger  It may help slow the growth of the prostate or shrink the prostate  · Take your medicine as directed  Contact your healthcare provider if you think your medicine is not helping or if you have side effects  Tell him or her if you are allergic to any medicine  Keep a list of the medicines, vitamins, and herbs you take  Include the amounts, and when and why you take them  Bring the list or the pill bottles to follow-up visits  Carry your medicine list with you in case of an emergency  Follow up with your healthcare provider as directed:  Write down your questions so you remember to ask them during your visits  Manage BPH:   · Do not let your bladder get too full before you empty it  Urinate when you feel the urge  · Limit alcohol  Do not drink large amounts of any liquid at one time  · Decrease the amount of salt you eat  Examples of salty foods are chips, cured meats, and canned soups  Do not use table salt  · Healthcare providers may tell you not to eat spicy foods such as chilli peppers  This may help you find out if spicy food makes your BPH symptoms worse  · You may have sex if you feel well  Contact your healthcare provider if:   · There is a large amount of blood in your urine  · Your signs and symptoms get worse  · You have a fever  · You have questions or concerns about your condition or care  Seek care immediately if:   · You are unable to urinate  · Your bladder feels very full and painful  © 2017 [employer] Information is for End User's use only and may not be sold, redistributed or otherwise used for commercial purposes  All illustrations and images included in CareNotes® are the copyrighted property of A D A M [employer]  or Josiah Lizarraga  The above information is an  only  It is not intended as medical advice for individual conditions or treatments  Talk to your doctor, nurse or pharmacist before following any medical regimen to see if it is safe and effective for you

## 2020-04-02 DIAGNOSIS — N13.9 BENIGN LOCALIZED HYPERPLASIA OF PROSTATE WITH URINARY OBSTRUCTION AND LOWER URINARY TRACT SYMPTOMS: ICD-10-CM

## 2020-04-02 DIAGNOSIS — N40.1 BENIGN LOCALIZED HYPERPLASIA OF PROSTATE WITH URINARY OBSTRUCTION AND LOWER URINARY TRACT SYMPTOMS: ICD-10-CM

## 2020-04-03 RX ORDER — TAMSULOSIN HYDROCHLORIDE 0.4 MG/1
0.4 CAPSULE ORAL
Qty: 90 CAPSULE | Refills: 3 | Status: SHIPPED | OUTPATIENT
Start: 2020-04-03 | End: 2021-03-24

## 2021-01-28 ENCOUNTER — TELEPHONE (OUTPATIENT)
Dept: UROLOGY | Facility: CLINIC | Age: 71
End: 2021-01-28

## 2021-01-28 ENCOUNTER — LAB (OUTPATIENT)
Dept: LAB | Facility: IMAGING CENTER | Age: 71
End: 2021-01-28
Payer: COMMERCIAL

## 2021-01-28 DIAGNOSIS — R97.20 ELEVATED PSA: Primary | ICD-10-CM

## 2021-01-28 DIAGNOSIS — R97.20 ELEVATED PSA: ICD-10-CM

## 2021-01-28 LAB — PSA SERPL-MCNC: 2.7 NG/ML (ref 0–4)

## 2021-01-28 PROCEDURE — 84153 ASSAY OF PSA TOTAL: CPT

## 2021-03-10 DIAGNOSIS — Z23 ENCOUNTER FOR IMMUNIZATION: ICD-10-CM

## 2021-03-23 NOTE — TELEPHONE ENCOUNTER
Patient left a message on the Medication Refill voice mail line requesting a new prescription for Tamsulosin 0 4mg, 90 day supply to [facility] in [place]

## 2021-03-24 DIAGNOSIS — N40.1 BENIGN LOCALIZED HYPERPLASIA OF PROSTATE WITH URINARY OBSTRUCTION AND LOWER URINARY TRACT SYMPTOMS: ICD-10-CM

## 2021-03-24 DIAGNOSIS — N13.9 BENIGN LOCALIZED HYPERPLASIA OF PROSTATE WITH URINARY OBSTRUCTION AND LOWER URINARY TRACT SYMPTOMS: ICD-10-CM

## 2021-03-24 RX ORDER — TAMSULOSIN HYDROCHLORIDE 0.4 MG/1
CAPSULE ORAL
Qty: 90 CAPSULE | Refills: 0 | Status: SHIPPED | OUTPATIENT
Start: 2021-03-24 | End: 2021-10-08 | Stop reason: SDUPTHER

## 2021-03-24 NOTE — TELEPHONE ENCOUNTER
The patient has an upcoming office visit scheduled for 5/28/21 with Dr Tramaine Sandhu in the [facility] location but will run out of medication until then    Request for same, 90 day supply with NO refills was queued and forwarded to the Advanced Practitioner covering the [facility] location for approval

## 2021-05-11 PROCEDURE — 81210 BRAF GENE: CPT | Performed by: PATHOLOGY

## 2021-05-11 PROCEDURE — 88341 IMHCHEM/IMCYTCHM EA ADD ANTB: CPT | Performed by: PATHOLOGY

## 2021-05-11 PROCEDURE — 88342 IMHCHEM/IMCYTCHM 1ST ANTB: CPT | Performed by: PATHOLOGY

## 2021-05-11 PROCEDURE — 88305 TISSUE EXAM BY PATHOLOGIST: CPT | Performed by: PATHOLOGY

## 2021-05-12 ENCOUNTER — LAB REQUISITION (OUTPATIENT)
Dept: LAB | Facility: HOSPITAL | Age: 71
End: 2021-05-12
Payer: COMMERCIAL

## 2021-05-12 DIAGNOSIS — Z12.11 ENCOUNTER FOR SCREENING FOR MALIGNANT NEOPLASM OF COLON: ICD-10-CM

## 2021-05-18 ENCOUNTER — TRANSCRIBE ORDERS (OUTPATIENT)
Dept: LAB | Facility: CLINIC | Age: 71
End: 2021-05-18

## 2022-07-01 ENCOUNTER — TELEPHONE (OUTPATIENT)
Dept: UROLOGY | Facility: MEDICAL CENTER | Age: 72
End: 2022-07-01

## 2022-07-01 DIAGNOSIS — N40.1 BENIGN LOCALIZED HYPERPLASIA OF PROSTATE WITH URINARY OBSTRUCTION AND LOWER URINARY TRACT SYMPTOMS: Primary | ICD-10-CM

## 2022-07-01 DIAGNOSIS — N13.9 BENIGN LOCALIZED HYPERPLASIA OF PROSTATE WITH URINARY OBSTRUCTION AND LOWER URINARY TRACT SYMPTOMS: Primary | ICD-10-CM

## 2022-07-01 NOTE — TELEPHONE ENCOUNTER
Patient of Dr Miri Saleh at [facility]    Patient called to schedule his yearly follow up  Patient needs a new psa order to do prior to his appointment on 07/08/22  Patient will go for it Tuesday or Wednesday next week      Patient can be reached at [phone]  Nursery: [phone]

## 2022-07-05 ENCOUNTER — APPOINTMENT (OUTPATIENT)
Dept: LAB | Facility: IMAGING CENTER | Age: 72
End: 2022-07-05
Payer: COMMERCIAL

## 2022-07-05 DIAGNOSIS — N13.9 BENIGN LOCALIZED HYPERPLASIA OF PROSTATE WITH URINARY OBSTRUCTION AND LOWER URINARY TRACT SYMPTOMS: ICD-10-CM

## 2022-07-05 DIAGNOSIS — N40.1 BENIGN LOCALIZED HYPERPLASIA OF PROSTATE WITH URINARY OBSTRUCTION AND LOWER URINARY TRACT SYMPTOMS: ICD-10-CM

## 2022-07-05 LAB — PSA SERPL-MCNC: 2.2 NG/ML (ref 0–4)

## 2022-07-05 PROCEDURE — 84153 ASSAY OF PSA TOTAL: CPT

## 2022-07-08 ENCOUNTER — OFFICE VISIT (OUTPATIENT)
Dept: UROLOGY | Facility: MEDICAL CENTER | Age: 72
End: 2022-07-08
Payer: COMMERCIAL

## 2022-07-08 VITALS
DIASTOLIC BLOOD PRESSURE: 86 MMHG | BODY MASS INDEX: 24.86 KG/M2 | WEIGHT: 164 LBS | SYSTOLIC BLOOD PRESSURE: 138 MMHG | OXYGEN SATURATION: 95 % | HEART RATE: 70 BPM | HEIGHT: 68 IN

## 2022-07-08 DIAGNOSIS — N13.9 BENIGN LOCALIZED HYPERPLASIA OF PROSTATE WITH URINARY OBSTRUCTION AND LOWER URINARY TRACT SYMPTOMS: Primary | ICD-10-CM

## 2022-07-08 DIAGNOSIS — N20.0 CALCULUS OF KIDNEY: ICD-10-CM

## 2022-07-08 DIAGNOSIS — N40.1 BENIGN LOCALIZED HYPERPLASIA OF PROSTATE WITH URINARY OBSTRUCTION AND LOWER URINARY TRACT SYMPTOMS: Primary | ICD-10-CM

## 2022-07-08 PROCEDURE — 99214 OFFICE O/P EST MOD 30 MIN: CPT | Performed by: UROLOGY

## 2022-07-08 RX ORDER — DOXYCYCLINE HYCLATE 100 MG/1
100 CAPSULE ORAL EVERY 12 HOURS SCHEDULED
COMMUNITY

## 2022-07-08 RX ORDER — FERROUS SULFATE 325(65) MG
325 TABLET ORAL
COMMUNITY

## 2022-07-08 NOTE — ASSESSMENT & PLAN NOTE
Tiny bilateral nonobstructing stones are noted on most recent CT  He remains asymptomatic and we will continue to follow  He will return in 1 year

## 2022-07-08 NOTE — ASSESSMENT & PLAN NOTE
AUA symptom score is 7 on tamsulosin  He is satisfied his voiding pattern  PSA is stable at 2 2 (July 5, 2022)  We will continue present therapy  He will return in 1 year    We will plan to recheck his PSA at that time

## 2022-07-08 NOTE — PROGRESS NOTES
Assessment/Plan:    Benign localized hyperplasia of prostate with urinary obstruction and lower urinary tract symptoms  AUA symptom score is 7 on tamsulosin  He is satisfied his voiding pattern  PSA is stable at 2 2 (July 5, 2022)  We will continue present therapy  He will return in 1 year  We will plan to recheck his PSA at that time    Calculus of kidney  Tiny bilateral nonobstructing stones are noted on most recent CT  He remains asymptomatic and we will continue to follow  He will return in 1 year  Diagnoses and all orders for this visit:    Benign localized hyperplasia of prostate with urinary obstruction and lower urinary tract symptoms  -     PSA Total, Diagnostic; Future  -     Urinalysis with microscopic    Calculus of kidney  -     Urinalysis with microscopic    Other orders  -     doxycycline hyclate (VIBRAMYCIN) 100 mg capsule; Take 100 mg by mouth every 12 (twelve) hours  -     ferrous sulfate 325 (65 Fe) mg tablet; Take 325 mg by mouth daily with breakfast          Subjective:      Patient ID: Gustavo Lombardi is a 74 y o  male  Benign Prostatic Hypertrophy  This is a chronic problem  The current episode started more than 1 year ago  The problem is unchanged  Irritative symptoms include nocturia ( nocturia x 0-2)  Irritative symptoms do not include frequency or urgency  Obstructive symptoms include incomplete emptying ( occasional), an intermittent stream and a slower stream (Occasional)  Obstructive symptoms do not include dribbling, straining or a weak stream  Pertinent negatives include no chills, dysuria, genital pain, hematuria, hesitancy, nausea or vomiting  AUA score is 0-7  His sexual activity is non-contributory to the current illness  Nothing aggravates the symptoms  Past treatments include tamsulosin (He is s/p TURP 2017)  Kidney stones    He remains asymptomatic  He denies flank pain  There is no dysuria or symptoms of infection    He has not passed any stones over the last year  The following portions of the patient's history were reviewed and updated as appropriate: allergies, current medications, past family history, past medical history, past social history, past surgical history and problem list     Review of Systems   Constitutional: Negative for chills, diaphoresis, fatigue and fever  HENT: Negative  Eyes: Negative  Respiratory: Negative  Cardiovascular: Negative  Gastrointestinal: Negative  Negative for nausea and vomiting  Endocrine: Negative  Genitourinary: Positive for incomplete emptying ( occasional) and nocturia ( nocturia x 0-2)  Negative for dysuria, frequency, hematuria, hesitancy and urgency  See HPI   Musculoskeletal: Positive for arthralgias  Skin: Negative  Allergic/Immunologic: Negative  Neurological: Positive for numbness  Hematological: Negative  Psychiatric/Behavioral: Negative          AUA SYMPTOM SCORE    Flowsheet Row Most Recent Value   AUA SYMPTOM SCORE    How often have you had a sensation of not emptying your bladder completely after you finished urinating? 1 (P)     How often have you had to urinate again less than two hours after you finished urinating? 1 (P)     How often have you found you stopped and started again several times when you urinate? 1 (P)     How often have you found it difficult to postpone urination? 1 (P)     How often have you had a weak urinary stream? 1 (P)     How often have you had to push or strain to begin urination? 0 (P)     How many times did you most typically get up to urinate from the time you went to bed at night until the time you got up in the morning? 2 (P)     Quality of Life: If you were to spend the rest of your life with your urinary condition just the way it is now, how would you feel about that? 2 (P)     AUA SYMPTOM SCORE 7 (P)         Objective:      /86   Pulse 70   Ht 5' 8" (1 727 m)   Wt 74 4 kg (164 lb)   SpO2 95%   BMI 24 94 kg/m² Physical Exam  Vitals reviewed  Constitutional:       Appearance: He is well-developed  HENT:      Head: Normocephalic and atraumatic  Eyes:      Conjunctiva/sclera: Conjunctivae normal    Cardiovascular:      Rate and Rhythm: Normal rate  Pulmonary:      Effort: Pulmonary effort is normal    Abdominal:      General: Bowel sounds are normal  There is no distension  Palpations: Abdomen is soft  There is no mass  Tenderness: There is no abdominal tenderness  There is no guarding or rebound  Genitourinary:     Penis: Normal  No phimosis or hypospadias  Testes: Normal          Right: Mass not present  Left: Mass not present  Rectum: Normal       Comments: Prostate 2 X enlarged and palpably benign  Musculoskeletal:      Cervical back: Neck supple  Skin:     General: Skin is warm and dry  Neurological:      Mental Status: He is alert and oriented to person, place, and time  Psychiatric:         Behavior: Behavior normal          Thought Content:  Thought content normal          Judgment: Judgment normal

## 2022-10-11 DIAGNOSIS — N13.9 BENIGN LOCALIZED HYPERPLASIA OF PROSTATE WITH URINARY OBSTRUCTION AND LOWER URINARY TRACT SYMPTOMS: ICD-10-CM

## 2022-10-11 DIAGNOSIS — N40.1 BENIGN LOCALIZED HYPERPLASIA OF PROSTATE WITH URINARY OBSTRUCTION AND LOWER URINARY TRACT SYMPTOMS: ICD-10-CM

## 2022-10-11 RX ORDER — TAMSULOSIN HYDROCHLORIDE 0.4 MG/1
0.4 CAPSULE ORAL
Qty: 90 CAPSULE | Refills: 0 | Status: SHIPPED | OUTPATIENT
Start: 2022-10-11

## 2022-11-23 ENCOUNTER — LAB REQUISITION (OUTPATIENT)
Dept: LAB | Facility: HOSPITAL | Age: 72
End: 2022-11-23

## 2022-11-23 DIAGNOSIS — Z85.038 PERSONAL HISTORY OF OTHER MALIGNANT NEOPLASM OF LARGE INTESTINE: ICD-10-CM

## 2022-11-23 DIAGNOSIS — Z12.11 ENCOUNTER FOR SCREENING FOR MALIGNANT NEOPLASM OF COLON: ICD-10-CM

## 2022-12-29 DIAGNOSIS — N40.1 BENIGN LOCALIZED HYPERPLASIA OF PROSTATE WITH URINARY OBSTRUCTION AND LOWER URINARY TRACT SYMPTOMS: ICD-10-CM

## 2022-12-29 DIAGNOSIS — N13.9 BENIGN LOCALIZED HYPERPLASIA OF PROSTATE WITH URINARY OBSTRUCTION AND LOWER URINARY TRACT SYMPTOMS: ICD-10-CM

## 2022-12-29 RX ORDER — TAMSULOSIN HYDROCHLORIDE 0.4 MG/1
0.4 CAPSULE ORAL
Qty: 90 CAPSULE | Refills: 0 | Status: SHIPPED | OUTPATIENT
Start: 2022-12-29

## 2022-12-29 NOTE — TELEPHONE ENCOUNTER
Medication Refill Request     Name tamsulosin    Dose/Frequency 0 4 mg daily   Quantity 90   Verified pharmacy   [x]  Verified ordering Provider   [x]  Does patient have enough for the next 3 days?  Yes [x] No []

## 2023-04-07 DIAGNOSIS — N40.1 BENIGN LOCALIZED HYPERPLASIA OF PROSTATE WITH URINARY OBSTRUCTION AND LOWER URINARY TRACT SYMPTOMS: ICD-10-CM

## 2023-04-07 DIAGNOSIS — N13.9 BENIGN LOCALIZED HYPERPLASIA OF PROSTATE WITH URINARY OBSTRUCTION AND LOWER URINARY TRACT SYMPTOMS: ICD-10-CM

## 2023-04-07 RX ORDER — TAMSULOSIN HYDROCHLORIDE 0.4 MG/1
0.4 CAPSULE ORAL
Qty: 90 CAPSULE | Refills: 0 | Status: SHIPPED | OUTPATIENT
Start: 2023-04-07

## 2023-04-07 NOTE — TELEPHONE ENCOUNTER
Medication Refill Request     Name tamsulosin (FLOMAX) 0 4 mg  Dose/Frequency Take 1 capsule (0 4 mg total) by mouth daily with dinner  Quantity 90 capsule   Verified pharmacy   [x]  Verified ordering Provider   [x]  Does patient have enough for the next 3 days?  Yes [x] No []

## 2023-06-05 ENCOUNTER — APPOINTMENT (OUTPATIENT)
Dept: LAB | Facility: IMAGING CENTER | Age: 73
End: 2023-06-05
Payer: COMMERCIAL

## 2023-06-05 DIAGNOSIS — N40.1 BENIGN LOCALIZED HYPERPLASIA OF PROSTATE WITH URINARY OBSTRUCTION AND LOWER URINARY TRACT SYMPTOMS: ICD-10-CM

## 2023-06-05 DIAGNOSIS — N13.9 BENIGN LOCALIZED HYPERPLASIA OF PROSTATE WITH URINARY OBSTRUCTION AND LOWER URINARY TRACT SYMPTOMS: ICD-10-CM

## 2023-06-05 LAB
AMORPH URATE CRY URNS QL MICRO: NORMAL
BACTERIA UR QL AUTO: NORMAL /HPF
BILIRUB UR QL STRIP: NEGATIVE
CLARITY UR: CLEAR
COLOR UR: NORMAL
GLUCOSE UR STRIP-MCNC: NEGATIVE MG/DL
HGB UR QL STRIP.AUTO: NEGATIVE
KETONES UR STRIP-MCNC: NEGATIVE MG/DL
LEUKOCYTE ESTERASE UR QL STRIP: NEGATIVE
NITRITE UR QL STRIP: NEGATIVE
NON-SQ EPI CELLS URNS QL MICRO: NORMAL /HPF
PH UR STRIP.AUTO: 7 [PH]
PROT UR STRIP-MCNC: NEGATIVE MG/DL
PSA SERPL-MCNC: 2.8 NG/ML (ref 0–4)
RBC #/AREA URNS AUTO: NORMAL /HPF
SP GR UR STRIP.AUTO: 1.01 (ref 1–1.03)
UROBILINOGEN UR STRIP-ACNC: <2 MG/DL
WBC #/AREA URNS AUTO: NORMAL /HPF

## 2023-06-05 PROCEDURE — 36415 COLL VENOUS BLD VENIPUNCTURE: CPT

## 2023-06-05 PROCEDURE — 84153 ASSAY OF PSA TOTAL: CPT

## 2023-06-09 ENCOUNTER — OFFICE VISIT (OUTPATIENT)
Dept: UROLOGY | Facility: MEDICAL CENTER | Age: 73
End: 2023-06-09
Payer: COMMERCIAL

## 2023-06-09 VITALS
WEIGHT: 160 LBS | BODY MASS INDEX: 24.25 KG/M2 | HEART RATE: 76 BPM | SYSTOLIC BLOOD PRESSURE: 136 MMHG | HEIGHT: 68 IN | DIASTOLIC BLOOD PRESSURE: 80 MMHG

## 2023-06-09 DIAGNOSIS — N20.0 CALCULUS OF KIDNEY: Primary | ICD-10-CM

## 2023-06-09 DIAGNOSIS — N40.1 BENIGN LOCALIZED HYPERPLASIA OF PROSTATE WITH URINARY OBSTRUCTION AND LOWER URINARY TRACT SYMPTOMS: ICD-10-CM

## 2023-06-09 DIAGNOSIS — N13.9 BENIGN LOCALIZED HYPERPLASIA OF PROSTATE WITH URINARY OBSTRUCTION AND LOWER URINARY TRACT SYMPTOMS: ICD-10-CM

## 2023-06-09 PROCEDURE — 99214 OFFICE O/P EST MOD 30 MIN: CPT | Performed by: UROLOGY

## 2023-06-09 RX ORDER — AMLODIPINE BESYLATE 5 MG/1
TABLET ORAL
COMMUNITY
Start: 2023-05-31

## 2023-06-09 RX ORDER — TAMSULOSIN HYDROCHLORIDE 0.4 MG/1
0.4 CAPSULE ORAL
Qty: 90 CAPSULE | Refills: 3 | Status: SHIPPED | OUTPATIENT
Start: 2023-06-09

## 2023-06-09 NOTE — ASSESSMENT & PLAN NOTE
AUA symptom score is 9 on tamsulosin  He is pleased with his voiding pattern  Urinalysis is negative    PSA is normal at 2 8 (June 5, 2023)

## 2023-06-09 NOTE — PROGRESS NOTES
Assessment/Plan:    Calculus of kidney  CT done recently at [facility] reveals right renal atrophy and nonobstructing renal stones  No hydronephrosis is noted  We will have the CT downloaded to the [employer] system for viewing  Patient is currently asymptomatic  We will review his CAT scan and provide further recommendations once the images are  reviewed  Benign localized hyperplasia of prostate with urinary obstruction and lower urinary tract symptoms  AUA symptom score is 9 on tamsulosin  He is pleased with his voiding pattern  Urinalysis is negative  PSA is normal at 2 8 (June 5, 2023)       Diagnoses and all orders for this visit:    Calculus of kidney  -     Urinalysis with microscopic; Future    Benign localized hyperplasia of prostate with urinary obstruction and lower urinary tract symptoms  -     tamsulosin (FLOMAX) 0 4 mg; Take 1 capsule (0 4 mg total) by mouth daily with dinner  -     PSA Total, Diagnostic; Future    Other orders  -     amLODIPine (NORVASC) 5 mg tablet          Subjective:      Patient ID: Gricel Greer is a 74 y o  male  Chief complaint: Lower urinary tract symptoms, kidney stones    HPI: 49-year-old male followed for the above complaints  The patient notes he is voiding well on tamsulosin  Stream is adequate and he feels he empties his bladder well  He gets up 2-3 times at night to urinate  There is no gross hematuria, dysuria or symptoms of infection  He has no incontinence  He does have a long history of kidney stones  He notes no recent symptoms  The patient is followed by oncology for colon cancer  He recently had a CAT scan and was noted to have nonobstructing stones as well as right renal atrophy  It was recommended that he return to urology for follow-up        The following portions of the patient's history were reviewed and updated as appropriate: allergies, current medications, past family history, past medical history, past social history, past surgical "history and problem list     Review of Systems   Constitutional: Negative for chills, diaphoresis, fatigue and fever  HENT: Negative  Eyes: Negative  Respiratory: Negative  Cardiovascular: Negative  Gastrointestinal: Negative  Endocrine: Negative  Genitourinary:        See HPI   Musculoskeletal: Negative  Skin: Negative  Allergic/Immunologic: Negative  Neurological: Negative  Hematological: Negative  Psychiatric/Behavioral: Negative  AUA SYMPTOM SCORE    Flowsheet Row Most Recent Value   AUA SYMPTOM SCORE    How often have you had a sensation of not emptying your bladder completely after you finished urinating? 1 (P)     How often have you had to urinate again less than two hours after you finished urinating? 1 (P)     How often have you found you stopped and started again several times when you urinate? 1 (P)     How often have you found it difficult to postpone urination? 1 (P)     How often have you had a weak urinary stream? 2 (P)     How often have you had to push or strain to begin urination? 0 (P)     How many times did you most typically get up to urinate from the time you went to bed at night until the time you got up in the morning? 3 (P)     Quality of Life: If you were to spend the rest of your life with your urinary condition just the way it is now, how would you feel about that? 1 (P)     AUA SYMPTOM SCORE 9 (P)         Objective:      /80   Pulse 76   Ht 5' 8\" (1 727 m)   Wt 72 6 kg (160 lb)   BMI 24 33 kg/m²          Physical Exam  Vitals reviewed  Constitutional:       General: He is not in acute distress  Appearance: Normal appearance  He is well-developed and normal weight  He is not ill-appearing, toxic-appearing or diaphoretic  HENT:      Head: Normocephalic and atraumatic  Eyes:      General: No scleral icterus  Conjunctiva/sclera: Conjunctivae normal    Cardiovascular:      Rate and Rhythm: Normal rate     Pulmonary:      Effort: " Pulmonary effort is normal    Abdominal:      General: Bowel sounds are normal  There is no distension  Palpations: Abdomen is soft  There is no mass  Tenderness: There is no abdominal tenderness  There is no right CVA tenderness, left CVA tenderness, guarding or rebound  Hernia: No hernia is present  Genitourinary:     Penis: Normal  No phimosis or hypospadias  Testes: Normal          Right: Mass not present  Left: Mass not present  Rectum: Normal       Comments: Prostate moderately enlarged and palpably benign  Musculoskeletal:         General: Normal range of motion  Cervical back: Neck supple  Skin:     General: Skin is warm and dry  Neurological:      General: No focal deficit present  Mental Status: He is alert and oriented to person, place, and time  Psychiatric:         Mood and Affect: Mood normal          Behavior: Behavior normal          Thought Content:  Thought content normal          Judgment: Judgment normal

## 2023-06-09 NOTE — ASSESSMENT & PLAN NOTE
CT done recently at [facility] reveals right renal atrophy and nonobstructing renal stones  No hydronephrosis is noted  We will have the CT downloaded to the [employer] system for viewing  Patient is currently asymptomatic  We will review his CAT scan and provide further recommendations once the images are  reviewed

## 2023-06-20 ENCOUNTER — TELEPHONE (OUTPATIENT)
Dept: UROLOGY | Facility: MEDICAL CENTER | Age: 73
End: 2023-06-20

## 2023-06-20 NOTE — TELEPHONE ENCOUNTER
LVM for patient to obtain disc and have sent to our office or bring it himself so we can download it into Epic system  ----- Message from Christie Lin RN sent at 6/20/2023 12:49 PM EDT -----  Ask to speak to radiology dept for MRI request for images  Otherwise contact pt and ask him to  the disc and bring it to office to have copied  thanks  ----- Message -----  From: Silas Kay MA  Sent: 6/20/2023  12:36 PM EDT  To: [place] [facility]    I do not know who to call at [facility]    ----- Message -----  From: Donny Sparks RN  Sent: 6/20/2023  11:42 AM EDT  To: [facility]    Please reach out to obtain images to go to our PACS  Thanks    ----- Message -----  From: Amanda Banuelos MD  Sent: 6/19/2023   5:10 PM EDT  To: Donny Sparks RN    Aundrea had called [facility] about getting the patient's CT images sent to our PACS  It looks like an attempt was made but there are no pictures to review in our PACS at this time  Can we call [employer] again and asked them to resend the images? If not can we call the patient and have him  a disc    Thanks

## 2023-06-20 NOTE — TELEPHONE ENCOUNTER
LVM [facility] radiology reading room to have CT images uploaded into our PACS

## 2023-06-20 NOTE — TELEPHONE ENCOUNTER
Leonard Reno from [facility] radiology called back and was transferred

## 2023-06-20 NOTE — TELEPHONE ENCOUNTER
Pt returned missed call and stated he gave the disc with the images to Dr Fernando Esquivel when he was in for his appt 6/9/23    If any other info is needed from pt please call and if no answer leave a detailed message    Pt call [phone]

## 2023-06-23 NOTE — TELEPHONE ENCOUNTER
Pt called requested a call back to go over CT scan results  Pt stated that he dropped the disc weeks ago

## 2023-06-28 NOTE — TELEPHONE ENCOUNTER
Spoke to Desiree Ko yesterday at  Reading Room and she was uploading CT;  Called patient and let him know we are working on getting CT uploaded

## 2023-06-28 NOTE — TELEPHONE ENCOUNTER
Patient calling in asking about CT  I made patient aware that we did not have it uploaded yet from [facility]  Patient questioning if we can call [facility] to have this uploaded  Patient requesting call back       CB: [phone]

## 2023-06-30 ENCOUNTER — TELEPHONE (OUTPATIENT)
Dept: UROLOGY | Facility: MEDICAL CENTER | Age: 73
End: 2023-06-30

## 2023-06-30 NOTE — TELEPHONE ENCOUNTER
Please call patient  Was able to finally see his x-rays  He has a tiny stone in the right kidney which appears on some images to be parenchymal   There is no obstructing stone or hydronephrosis  I would continue to follow for now  We can obtain x-rays in the future to ensure that the stone does not grow

## 2023-07-03 NOTE — TELEPHONE ENCOUNTER
I spoke with the patient and provided him with this information. He does not have a follow up scheduled please advise on timeframe he should come back.

## 2023-07-06 NOTE — TELEPHONE ENCOUNTER
Called and spoke with pt; advised dr Jhon Payton wants him back in 1 year and he would get a letter in the mail a couple months prior to remind him to call for appt. Recall already in pt's chart.

## 2024-06-06 ENCOUNTER — APPOINTMENT (OUTPATIENT)
Dept: LAB | Facility: IMAGING CENTER | Age: 74
End: 2024-06-06
Payer: COMMERCIAL

## 2024-06-06 DIAGNOSIS — N13.9 BENIGN LOCALIZED HYPERPLASIA OF PROSTATE WITH URINARY OBSTRUCTION AND LOWER URINARY TRACT SYMPTOMS: ICD-10-CM

## 2024-06-06 DIAGNOSIS — N20.0 CALCULUS OF KIDNEY: ICD-10-CM

## 2024-06-06 DIAGNOSIS — N40.1 BENIGN LOCALIZED HYPERPLASIA OF PROSTATE WITH URINARY OBSTRUCTION AND LOWER URINARY TRACT SYMPTOMS: ICD-10-CM

## 2024-06-06 LAB
AMORPH URATE CRY URNS QL MICRO: ABNORMAL
BACTERIA UR QL AUTO: ABNORMAL /HPF
BILIRUB UR QL STRIP: NEGATIVE
CLARITY UR: CLEAR
COLOR UR: ABNORMAL
GLUCOSE UR STRIP-MCNC: NEGATIVE MG/DL
HGB UR QL STRIP.AUTO: NEGATIVE
KETONES UR STRIP-MCNC: NEGATIVE MG/DL
LEUKOCYTE ESTERASE UR QL STRIP: NEGATIVE
NITRITE UR QL STRIP: NEGATIVE
NON-SQ EPI CELLS URNS QL MICRO: ABNORMAL /HPF
PH UR STRIP.AUTO: 7 [PH]
PROT UR STRIP-MCNC: ABNORMAL MG/DL
PSA SERPL-MCNC: 3.29 NG/ML (ref 0–4)
RBC #/AREA URNS AUTO: ABNORMAL /HPF
SP GR UR STRIP.AUTO: 1.01 (ref 1–1.03)
UROBILINOGEN UR STRIP-ACNC: <2 MG/DL
WBC #/AREA URNS AUTO: ABNORMAL /HPF

## 2024-06-06 PROCEDURE — 81001 URINALYSIS AUTO W/SCOPE: CPT

## 2024-06-06 PROCEDURE — 84153 ASSAY OF PSA TOTAL: CPT

## 2024-06-06 PROCEDURE — 36415 COLL VENOUS BLD VENIPUNCTURE: CPT

## 2024-06-12 ENCOUNTER — TELEPHONE (OUTPATIENT)
Dept: UROLOGY | Facility: MEDICAL CENTER | Age: 74
End: 2024-06-12

## 2024-08-06 ENCOUNTER — OFFICE VISIT (OUTPATIENT)
Dept: UROLOGY | Facility: MEDICAL CENTER | Age: 74
End: 2024-08-06
Payer: COMMERCIAL

## 2024-08-06 VITALS
HEIGHT: 68 IN | SYSTOLIC BLOOD PRESSURE: 112 MMHG | HEART RATE: 92 BPM | BODY MASS INDEX: 23.67 KG/M2 | OXYGEN SATURATION: 96 % | WEIGHT: 156.2 LBS | DIASTOLIC BLOOD PRESSURE: 86 MMHG

## 2024-08-06 DIAGNOSIS — N13.9 BENIGN LOCALIZED HYPERPLASIA OF PROSTATE WITH URINARY OBSTRUCTION AND LOWER URINARY TRACT SYMPTOMS: Primary | ICD-10-CM

## 2024-08-06 DIAGNOSIS — N20.0 CALCULUS OF KIDNEY: ICD-10-CM

## 2024-08-06 DIAGNOSIS — N40.1 BENIGN LOCALIZED HYPERPLASIA OF PROSTATE WITH URINARY OBSTRUCTION AND LOWER URINARY TRACT SYMPTOMS: Primary | ICD-10-CM

## 2024-08-06 PROCEDURE — 99214 OFFICE O/P EST MOD 30 MIN: CPT | Performed by: UROLOGY

## 2024-08-06 RX ORDER — NORTRIPTYLINE HYDROCHLORIDE 10 MG/1
10 CAPSULE ORAL
COMMUNITY
Start: 2024-02-16 | End: 2025-02-15

## 2024-08-06 RX ORDER — ROSUVASTATIN CALCIUM 20 MG/1
20 TABLET, COATED ORAL DAILY
COMMUNITY
Start: 2024-04-05 | End: 2025-04-05

## 2024-08-06 NOTE — PROGRESS NOTES
Assessment/Plan:    Benign localized hyperplasia of prostate with urinary obstruction and lower urinary tract symptoms  He notes nocturia is getting worse.  He remains on tamsulosin.  AUA symptom score is 13.  PSA was 3.29 in June 2024.  Options were discussed.  He will try to double up on the tamsulosin to see if this improves his symptoms.  He will return for cystoscopy.  There is a remote history of TURP.  In 2018 bladder neck was noted to be open on cystoscopy.    Calculus of kidney  Nonobstructing stones were noted in the past.  He is asymptomatic.  Urinalysis was negative.  We will continue to follow clinically.       Diagnoses and all orders for this visit:    Benign localized hyperplasia of prostate with urinary obstruction and lower urinary tract symptoms  -     Cystoscopy; Future    Calculus of kidney    Other orders  -     rosuvastatin (CRESTOR) 20 MG tablet; Take 20 mg by mouth daily  -     nortriptyline (PAMELOR) 10 mg capsule; Take 10 mg by mouth            Subjective:      Patient ID: Juan Mike is a 74 y.o. male.    Chief complaint: Lower urinary tract symptoms, kidney stones    HPI: 74-year-old male followed for the above complaints.  He remains on tamsulosin. Voiding pattern is getting worse.   Stream is adequate and he feels he empties his bladder well.  He gets up 3-4  times at night to urinate.  Stream is slow at night and he voids in small amounts. There is no gross hematuria, dysuria or symptoms of infection.  He has no incontinence.  He does have a long history of kidney stones.  He notes no recent symptoms.  The patient is followed by oncology for colon cancer.  He recently had a CAT scan and was noted to have nonobstructing stones as well as right renal atrophy.          The following portions of the patient's history were reviewed and updated as appropriate: allergies, current medications, past family history, past medical history, past social history, past surgical history and problem  "list.    Review of Systems   Constitutional:  Negative for chills, diaphoresis, fatigue and fever.   HENT: Negative.     Eyes: Negative.    Respiratory: Negative.     Cardiovascular: Negative.    Gastrointestinal: Negative.    Endocrine: Negative.    Genitourinary:         See HPI   Musculoskeletal: Negative.    Skin: Negative.    Allergic/Immunologic: Negative.    Neurological: Negative.    Hematological: Negative.    Psychiatric/Behavioral: Negative.         AUA SYMPTOM SCORE      Flowsheet Row Most Recent Value   AUA SYMPTOM SCORE    How often have you had a sensation of not emptying your bladder completely after you finished urinating? 1 (P)     How often have you had to urinate again less than two hours after you finished urinating? 2 (P)     How often have you found you stopped and started again several times when you urinate? 4 (P)     How often have you found it difficult to postpone urination? 2 (P)     How often have you had a weak urinary stream? 1 (P)     How often have you had to push or strain to begin urination? 0 (P)     How many times did you most typically get up to urinate from the time you went to bed at night until the time you got up in the morning? 3 (P)     Quality of Life: If you were to spend the rest of your life with your urinary condition just the way it is now, how would you feel about that? 2 (P)     AUA SYMPTOM SCORE 13 (P)               Objective:      /86 (BP Location: Left arm, Patient Position: Sitting, Cuff Size: Adult)   Pulse 92   Ht 5' 8\" (1.727 m)   Wt 70.9 kg (156 lb 3.2 oz)   SpO2 96%   BMI 23.75 kg/m²          Physical Exam  Vitals reviewed.   Constitutional:       General: He is not in acute distress.     Appearance: Normal appearance. He is well-developed and normal weight. He is not ill-appearing, toxic-appearing or diaphoretic.   HENT:      Head: Normocephalic and atraumatic.   Eyes:      General: No scleral icterus.     Conjunctiva/sclera: Conjunctivae " normal.   Cardiovascular:      Rate and Rhythm: Normal rate.   Pulmonary:      Effort: Pulmonary effort is normal.   Abdominal:      General: Bowel sounds are normal. There is no distension.      Palpations: Abdomen is soft. There is no mass.      Tenderness: There is no abdominal tenderness. There is no right CVA tenderness, left CVA tenderness, guarding or rebound.      Hernia: No hernia is present.   Genitourinary:     Penis: Normal. No phimosis or hypospadias.       Testes: Normal.         Right: Mass not present.         Left: Mass not present.      Rectum: Normal.      Comments: Prostate moderately enlarged and palpably benign.  Musculoskeletal:         General: Normal range of motion.      Cervical back: Neck supple.   Skin:     General: Skin is warm and dry.   Neurological:      General: No focal deficit present.      Mental Status: He is alert and oriented to person, place, and time.   Psychiatric:         Mood and Affect: Mood normal.         Behavior: Behavior normal.         Thought Content: Thought content normal.         Judgment: Judgment normal.

## 2024-08-06 NOTE — ASSESSMENT & PLAN NOTE
Nonobstructing stones were noted in the past.  He is asymptomatic.  Urinalysis was negative.  We will continue to follow clinically.

## 2024-08-06 NOTE — ASSESSMENT & PLAN NOTE
He notes nocturia is getting worse.  He remains on tamsulosin.  AUA symptom score is 13.  PSA was 3.29 in June 2024.  Options were discussed.  He will try to double up on the tamsulosin to see if this improves his symptoms.  He will return for cystoscopy.  There is a remote history of TURP.  In 2018 bladder neck was noted to be open on cystoscopy.

## 2024-10-24 ENCOUNTER — ANESTHESIA EVENT (OUTPATIENT)
Dept: PERIOP | Facility: HOSPITAL | Age: 74
End: 2024-10-24
Payer: COMMERCIAL

## 2024-10-24 ENCOUNTER — ANESTHESIA (OUTPATIENT)
Dept: PERIOP | Facility: HOSPITAL | Age: 74
End: 2024-10-24
Payer: COMMERCIAL

## 2024-10-24 ENCOUNTER — APPOINTMENT (OUTPATIENT)
Dept: RADIOLOGY | Facility: HOSPITAL | Age: 74
End: 2024-10-24
Payer: COMMERCIAL

## 2024-10-24 ENCOUNTER — HOSPITAL ENCOUNTER (OUTPATIENT)
Facility: HOSPITAL | Age: 74
Setting detail: OBSERVATION
Discharge: HOME | End: 2024-10-24
Attending: EMERGENCY MEDICINE | Admitting: UROLOGY
Payer: COMMERCIAL

## 2024-10-24 ENCOUNTER — APPOINTMENT (EMERGENCY)
Dept: CT IMAGING | Facility: HOSPITAL | Age: 74
End: 2024-10-24
Payer: COMMERCIAL

## 2024-10-24 VITALS
HEART RATE: 79 BPM | BODY MASS INDEX: 23.66 KG/M2 | TEMPERATURE: 97.6 F | RESPIRATION RATE: 19 BRPM | DIASTOLIC BLOOD PRESSURE: 98 MMHG | SYSTOLIC BLOOD PRESSURE: 166 MMHG | WEIGHT: 156.09 LBS | OXYGEN SATURATION: 95 % | HEIGHT: 68 IN

## 2024-10-24 DIAGNOSIS — N17.9 AKI (ACUTE KIDNEY INJURY) (HCC): ICD-10-CM

## 2024-10-24 DIAGNOSIS — N20.1 LEFT URETERAL STONE: ICD-10-CM

## 2024-10-24 DIAGNOSIS — K57.90 DIVERTICULOSIS: ICD-10-CM

## 2024-10-24 DIAGNOSIS — N20.0 CALCULUS OF KIDNEY: Primary | ICD-10-CM

## 2024-10-24 LAB
ANION GAP SERPL CALCULATED.3IONS-SCNC: 7 MMOL/L (ref 4–13)
BASOPHILS # BLD AUTO: 0.01 THOUSANDS/ΜL (ref 0–0.1)
BASOPHILS NFR BLD AUTO: 0 % (ref 0–1)
BILIRUB UR QL STRIP: NEGATIVE
BUN SERPL-MCNC: 33 MG/DL (ref 5–25)
CALCIUM SERPL-MCNC: 9.2 MG/DL (ref 8.4–10.2)
CHLORIDE SERPL-SCNC: 109 MMOL/L (ref 96–108)
CLARITY UR: CLEAR
CO2 SERPL-SCNC: 26 MMOL/L (ref 21–32)
COLOR UR: COLORLESS
CREAT SERPL-MCNC: 1.99 MG/DL (ref 0.6–1.3)
EOSINOPHIL # BLD AUTO: 0.06 THOUSAND/ΜL (ref 0–0.61)
EOSINOPHIL NFR BLD AUTO: 1 % (ref 0–6)
ERYTHROCYTE [DISTWIDTH] IN BLOOD BY AUTOMATED COUNT: 13 % (ref 11.6–15.1)
GFR SERPL CREATININE-BSD FRML MDRD: 31 ML/MIN/1.73SQ M
GLUCOSE SERPL-MCNC: 103 MG/DL (ref 65–140)
GLUCOSE UR STRIP-MCNC: NEGATIVE MG/DL
HCT VFR BLD AUTO: 42.8 % (ref 36.5–49.3)
HGB BLD-MCNC: 14.9 G/DL (ref 12–17)
HGB UR QL STRIP.AUTO: NEGATIVE
IMM GRANULOCYTES # BLD AUTO: 0.04 THOUSAND/UL (ref 0–0.2)
IMM GRANULOCYTES NFR BLD AUTO: 1 % (ref 0–2)
KETONES UR STRIP-MCNC: NEGATIVE MG/DL
LEUKOCYTE ESTERASE UR QL STRIP: NEGATIVE
LYMPHOCYTES # BLD AUTO: 1.01 THOUSANDS/ΜL (ref 0.6–4.47)
LYMPHOCYTES NFR BLD AUTO: 12 % (ref 14–44)
MCH RBC QN AUTO: 31.2 PG (ref 26.8–34.3)
MCHC RBC AUTO-ENTMCNC: 34.8 G/DL (ref 31.4–37.4)
MCV RBC AUTO: 90 FL (ref 82–98)
MONOCYTES # BLD AUTO: 1.03 THOUSAND/ΜL (ref 0.17–1.22)
MONOCYTES NFR BLD AUTO: 12 % (ref 4–12)
NEUTROPHILS # BLD AUTO: 6.63 THOUSANDS/ΜL (ref 1.85–7.62)
NEUTS SEG NFR BLD AUTO: 74 % (ref 43–75)
NITRITE UR QL STRIP: NEGATIVE
NRBC BLD AUTO-RTO: 0 /100 WBCS
PH UR STRIP.AUTO: 7 [PH]
PLATELET # BLD AUTO: 178 THOUSANDS/UL (ref 149–390)
PMV BLD AUTO: 9 FL (ref 8.9–12.7)
POTASSIUM SERPL-SCNC: 3.3 MMOL/L (ref 3.5–5.3)
PROT UR STRIP-MCNC: NEGATIVE MG/DL
RBC # BLD AUTO: 4.78 MILLION/UL (ref 3.88–5.62)
SODIUM SERPL-SCNC: 142 MMOL/L (ref 135–147)
SP GR UR STRIP.AUTO: 1.01 (ref 1–1.03)
UROBILINOGEN UR STRIP-ACNC: <2 MG/DL
WBC # BLD AUTO: 8.78 THOUSAND/UL (ref 4.31–10.16)

## 2024-10-24 PROCEDURE — 52356 CYSTO/URETERO W/LITHOTRIPSY: CPT | Performed by: UROLOGY

## 2024-10-24 PROCEDURE — C1758 CATHETER, URETERAL: HCPCS | Performed by: UROLOGY

## 2024-10-24 PROCEDURE — 74420 UROGRAPHY RTRGR +-KUB: CPT

## 2024-10-24 PROCEDURE — C2625 STENT, NON-COR, TEM W/DEL SY: HCPCS | Performed by: UROLOGY

## 2024-10-24 PROCEDURE — 87086 URINE CULTURE/COLONY COUNT: CPT | Performed by: UROLOGY

## 2024-10-24 PROCEDURE — 74176 CT ABD & PELVIS W/O CONTRAST: CPT

## 2024-10-24 PROCEDURE — 80048 BASIC METABOLIC PNL TOTAL CA: CPT

## 2024-10-24 PROCEDURE — 36415 COLL VENOUS BLD VENIPUNCTURE: CPT

## 2024-10-24 PROCEDURE — 82360 CALCULUS ASSAY QUANT: CPT | Performed by: UROLOGY

## 2024-10-24 PROCEDURE — 96361 HYDRATE IV INFUSION ADD-ON: CPT

## 2024-10-24 PROCEDURE — 96365 THER/PROPH/DIAG IV INF INIT: CPT

## 2024-10-24 PROCEDURE — 96375 TX/PRO/DX INJ NEW DRUG ADDON: CPT

## 2024-10-24 PROCEDURE — NC001 PR NO CHARGE: Performed by: UROLOGY

## 2024-10-24 PROCEDURE — 99284 EMERGENCY DEPT VISIT MOD MDM: CPT

## 2024-10-24 PROCEDURE — 99285 EMERGENCY DEPT VISIT HI MDM: CPT

## 2024-10-24 PROCEDURE — C1769 GUIDE WIRE: HCPCS | Performed by: UROLOGY

## 2024-10-24 PROCEDURE — 99024 POSTOP FOLLOW-UP VISIT: CPT | Performed by: UROLOGY

## 2024-10-24 PROCEDURE — 96366 THER/PROPH/DIAG IV INF ADDON: CPT

## 2024-10-24 PROCEDURE — 85025 COMPLETE CBC W/AUTO DIFF WBC: CPT

## 2024-10-24 PROCEDURE — 87077 CULTURE AEROBIC IDENTIFY: CPT | Performed by: UROLOGY

## 2024-10-24 PROCEDURE — 81003 URINALYSIS AUTO W/O SCOPE: CPT

## 2024-10-24 DEVICE — INLAY OPTIMA URETERAL STENT W/O GUIDEWIRE
Type: IMPLANTABLE DEVICE | Site: URETER | Status: FUNCTIONAL
Brand: BARD® INLAY OPTIMA® URETERAL STENT

## 2024-10-24 RX ORDER — HYDROCODONE BITARTRATE AND ACETAMINOPHEN 5; 325 MG/1; MG/1
1 TABLET ORAL EVERY 6 HOURS PRN
Qty: 5 TABLET | Refills: 0 | Status: SHIPPED | OUTPATIENT
Start: 2024-10-24

## 2024-10-24 RX ORDER — LIDOCAINE HYDROCHLORIDE 20 MG/ML
INJECTION, SOLUTION EPIDURAL; INFILTRATION; INTRACAUDAL; PERINEURAL AS NEEDED
Status: DISCONTINUED | OUTPATIENT
Start: 2024-10-24 | End: 2024-10-24

## 2024-10-24 RX ORDER — DEXAMETHASONE SODIUM PHOSPHATE 10 MG/ML
INJECTION, SOLUTION INTRAMUSCULAR; INTRAVENOUS AS NEEDED
Status: DISCONTINUED | OUTPATIENT
Start: 2024-10-24 | End: 2024-10-24

## 2024-10-24 RX ORDER — GABAPENTIN 100 MG/1
100 CAPSULE ORAL 3 TIMES DAILY PRN
Status: DISCONTINUED | OUTPATIENT
Start: 2024-10-24 | End: 2024-10-25 | Stop reason: HOSPADM

## 2024-10-24 RX ORDER — SODIUM CHLORIDE 9 MG/ML
100 INJECTION, SOLUTION INTRAVENOUS CONTINUOUS
Status: DISCONTINUED | OUTPATIENT
Start: 2024-10-24 | End: 2024-10-25 | Stop reason: HOSPADM

## 2024-10-24 RX ORDER — ONDANSETRON 2 MG/ML
4 INJECTION INTRAMUSCULAR; INTRAVENOUS EVERY 6 HOURS PRN
Status: DISCONTINUED | OUTPATIENT
Start: 2024-10-24 | End: 2024-10-25 | Stop reason: HOSPADM

## 2024-10-24 RX ORDER — CIPROFLOXACIN 500 MG/1
500 TABLET, FILM COATED ORAL EVERY 12 HOURS SCHEDULED
Qty: 6 TABLET | Refills: 0 | Status: SHIPPED | OUTPATIENT
Start: 2024-10-24 | End: 2024-10-27

## 2024-10-24 RX ORDER — POTASSIUM CHLORIDE 14.9 MG/ML
20 INJECTION INTRAVENOUS ONCE
Status: COMPLETED | OUTPATIENT
Start: 2024-10-24 | End: 2024-10-24

## 2024-10-24 RX ORDER — AMLODIPINE BESYLATE 5 MG/1
5 TABLET ORAL DAILY
Status: DISCONTINUED | OUTPATIENT
Start: 2024-10-24 | End: 2024-10-25 | Stop reason: HOSPADM

## 2024-10-24 RX ORDER — PROPOFOL 10 MG/ML
INJECTION, EMULSION INTRAVENOUS AS NEEDED
Status: DISCONTINUED | OUTPATIENT
Start: 2024-10-24 | End: 2024-10-24

## 2024-10-24 RX ORDER — FENTANYL CITRATE 50 UG/ML
25 INJECTION, SOLUTION INTRAMUSCULAR; INTRAVENOUS
Status: DISCONTINUED | OUTPATIENT
Start: 2024-10-24 | End: 2024-10-24 | Stop reason: HOSPADM

## 2024-10-24 RX ORDER — SODIUM CHLORIDE 9 MG/ML
125 INJECTION, SOLUTION INTRAVENOUS CONTINUOUS
OUTPATIENT
Start: 2024-10-24

## 2024-10-24 RX ORDER — CIPROFLOXACIN 500 MG/1
500 TABLET, FILM COATED ORAL 2 TIMES DAILY
Status: DISCONTINUED | OUTPATIENT
Start: 2024-10-25 | End: 2024-10-25 | Stop reason: HOSPADM

## 2024-10-24 RX ORDER — ATORVASTATIN CALCIUM 40 MG/1
40 TABLET, FILM COATED ORAL
Status: DISCONTINUED | OUTPATIENT
Start: 2024-10-24 | End: 2024-10-25 | Stop reason: HOSPADM

## 2024-10-24 RX ORDER — DOCUSATE SODIUM 100 MG/1
100 CAPSULE, LIQUID FILLED ORAL 2 TIMES DAILY
Status: DISCONTINUED | OUTPATIENT
Start: 2024-10-24 | End: 2024-10-25 | Stop reason: HOSPADM

## 2024-10-24 RX ORDER — TAMSULOSIN HYDROCHLORIDE 0.4 MG/1
0.4 CAPSULE ORAL
Status: DISCONTINUED | OUTPATIENT
Start: 2024-10-24 | End: 2024-10-25 | Stop reason: HOSPADM

## 2024-10-24 RX ORDER — KETOROLAC TROMETHAMINE 30 MG/ML
15 INJECTION, SOLUTION INTRAMUSCULAR; INTRAVENOUS ONCE
Status: COMPLETED | OUTPATIENT
Start: 2024-10-24 | End: 2024-10-24

## 2024-10-24 RX ORDER — MAGNESIUM HYDROXIDE 1200 MG/15ML
LIQUID ORAL AS NEEDED
Status: DISCONTINUED | OUTPATIENT
Start: 2024-10-24 | End: 2024-10-24 | Stop reason: HOSPADM

## 2024-10-24 RX ORDER — NORTRIPTYLINE HYDROCHLORIDE 10 MG/1
10 CAPSULE ORAL
Status: DISCONTINUED | OUTPATIENT
Start: 2024-10-24 | End: 2024-10-25 | Stop reason: HOSPADM

## 2024-10-24 RX ORDER — ONDANSETRON 2 MG/ML
4 INJECTION INTRAMUSCULAR; INTRAVENOUS EVERY 6 HOURS PRN
Status: DISCONTINUED | OUTPATIENT
Start: 2024-10-24 | End: 2024-10-24 | Stop reason: HOSPADM

## 2024-10-24 RX ORDER — HYDROCODONE BITARTRATE AND ACETAMINOPHEN 5; 325 MG/1; MG/1
1 TABLET ORAL EVERY 4 HOURS PRN
Status: DISCONTINUED | OUTPATIENT
Start: 2024-10-24 | End: 2024-10-25 | Stop reason: HOSPADM

## 2024-10-24 RX ORDER — ONDANSETRON 2 MG/ML
INJECTION INTRAMUSCULAR; INTRAVENOUS AS NEEDED
Status: DISCONTINUED | OUTPATIENT
Start: 2024-10-24 | End: 2024-10-24

## 2024-10-24 RX ORDER — CEFAZOLIN SODIUM 1 G/3ML
INJECTION, POWDER, FOR SOLUTION INTRAMUSCULAR; INTRAVENOUS AS NEEDED
Status: DISCONTINUED | OUTPATIENT
Start: 2024-10-24 | End: 2024-10-24

## 2024-10-24 RX ORDER — FENTANYL CITRATE 50 UG/ML
INJECTION, SOLUTION INTRAMUSCULAR; INTRAVENOUS AS NEEDED
Status: DISCONTINUED | OUTPATIENT
Start: 2024-10-24 | End: 2024-10-24

## 2024-10-24 RX ORDER — ACETAMINOPHEN 325 MG/1
650 TABLET ORAL EVERY 6 HOURS PRN
Status: DISCONTINUED | OUTPATIENT
Start: 2024-10-24 | End: 2024-10-25 | Stop reason: HOSPADM

## 2024-10-24 RX ADMIN — SODIUM CHLORIDE 1000 ML: 0.9 INJECTION, SOLUTION INTRAVENOUS at 09:21

## 2024-10-24 RX ADMIN — PHENYLEPHRINE HYDROCHLORIDE 30 MCG/MIN: 50 INJECTION INTRAVENOUS at 18:17

## 2024-10-24 RX ADMIN — KETOROLAC TROMETHAMINE 15 MG: 30 INJECTION, SOLUTION INTRAMUSCULAR; INTRAVENOUS at 09:19

## 2024-10-24 RX ADMIN — PROPOFOL 50 MG: 10 INJECTION, EMULSION INTRAVENOUS at 18:11

## 2024-10-24 RX ADMIN — ONDANSETRON 4 MG: 2 INJECTION INTRAMUSCULAR; INTRAVENOUS at 18:17

## 2024-10-24 RX ADMIN — SODIUM CHLORIDE 100 ML/HR: 0.9 INJECTION, SOLUTION INTRAVENOUS at 14:09

## 2024-10-24 RX ADMIN — CEFAZOLIN 1000 MG: 1 INJECTION, POWDER, FOR SOLUTION INTRAMUSCULAR; INTRAVENOUS; PARENTERAL at 18:18

## 2024-10-24 RX ADMIN — DEXAMETHASONE SODIUM PHOSPHATE 10 MG: 10 INJECTION INTRAMUSCULAR; INTRAVENOUS at 18:17

## 2024-10-24 RX ADMIN — PROPOFOL 150 MG: 10 INJECTION, EMULSION INTRAVENOUS at 18:10

## 2024-10-24 RX ADMIN — LIDOCAINE HYDROCHLORIDE 100 MG: 20 INJECTION, SOLUTION EPIDURAL; INFILTRATION; INTRACAUDAL at 18:10

## 2024-10-24 RX ADMIN — FENTANYL CITRATE 25 MCG: 50 INJECTION INTRAMUSCULAR; INTRAVENOUS at 18:17

## 2024-10-24 RX ADMIN — POTASSIUM CHLORIDE 20 MEQ: 14.9 INJECTION, SOLUTION INTRAVENOUS at 10:44

## 2024-10-24 NOTE — ASSESSMENT & PLAN NOTE
On flomax 0.4 mg daily for BPH with urinary frequency   Follows with our team in the outpatient setting - Dr. Banks     Plan:  Continue flomax as prescribed post-op  Return to office as scheduled 11/4 for cystoscopy

## 2024-10-24 NOTE — ANESTHESIA POSTPROCEDURE EVALUATION
Post-Op Assessment Note    CV Status:  Stable    Pain management: adequate       Mental Status:  Awake   Hydration Status:  Stable   PONV Controlled:  Controlled   Airway Patency:  Patent     Post Op Vitals Reviewed: Yes    No anethesia notable event occurred.    Staff: Anesthesiologist           Last Filed PACU Vitals:  Vitals Value Taken Time   Temp 97.7 °F (36.5 °C) 10/24/24 1918   Pulse 72 10/24/24 1929   /95 10/24/24 1921   Resp 18 10/24/24 1928   SpO2 93 % 10/24/24 1929   Vitals shown include unfiled device data.    Modified Taco:  Activity: 2 (10/24/2024  7:18 PM)  Respiration: 2 (10/24/2024  7:18 PM)  Circulation: 2 (10/24/2024  7:18 PM)  Consciousness: 2 (10/24/2024  7:18 PM)  Oxygen Saturation: 2 (10/24/2024  7:18 PM)  Modified Taco Score: 10 (10/24/2024  7:18 PM)

## 2024-10-24 NOTE — QUICK NOTE
Today Juan underwent left ureteroscopy.  I was able to remove his stone.  He has a stent in place with a string.  The patient is cleared for discharge when vital signs are stable, he remains afebrile, pain is controlled, he is able to tolerate p.o. and void.  Please discharge home with 3 days of Cipro and 5 Norco tablets.  The patient was instructed to remove his own stent on Sunday.  Discharge instructions completed.  Outpatient follow-up arranged.

## 2024-10-24 NOTE — ANESTHESIA POSTPROCEDURE EVALUATION
Post-Op Assessment Note    CV Status:  Stable    Pain management: adequate       Mental Status:  Awake   Hydration Status:  Euvolemic   PONV Controlled:  Controlled   Airway Patency:  Patent  Two or more mitigation strategies used for obstructive sleep apnea   Post Op Vitals Reviewed: Yes    No anethesia notable event occurred.    Staff: Anesthesiologist, CRNA           Last Filed PACU Vitals:  Vitals Value Taken Time   Temp 97.2 °F (36.2 °C) 10/24/24 1848   Pulse 78 10/24/24 1850   /89 10/24/24 1848   Resp 15 10/24/24 1850   SpO2 91 % 10/24/24 1850   Vitals shown include unfiled device data.    Modified Taco:  Activity: 2 (10/24/2024  6:48 PM)  Respiration: 2 (10/24/2024  6:48 PM)  Circulation: 2 (10/24/2024  6:48 PM)  Consciousness: 2 (10/24/2024  6:48 PM)  Oxygen Saturation: 2 (10/24/2024  6:48 PM)  Modified Taco Score: 10 (10/24/2024  6:48 PM)

## 2024-10-24 NOTE — PLAN OF CARE
Problem: PAIN - ADULT  Goal: Verbalizes/displays adequate comfort level or baseline comfort level  Description: Interventions:  - Encourage patient to monitor pain and request assistance  - Assess pain using appropriate pain scale  - Administer analgesics based on type and severity of pain and evaluate response  - Implement non-pharmacological measures as appropriate and evaluate response  - Consider cultural and social influences on pain and pain management  - Notify physician/advanced practitioner if interventions unsuccessful or patient reports new pain  Outcome: Progressing     Problem: DISCHARGE PLANNING  Goal: Discharge to home or other facility with appropriate resources  Description: INTERVENTIONS:  - Identify barriers to discharge w/patient and caregiver  - Arrange for needed discharge resources and transportation as appropriate  - Identify discharge learning needs (meds, wound care, etc.)  - Arrange for interpretive services to assist at discharge as needed  - Refer to Case Management Department for coordinating discharge planning if the patient needs post-hospital services based on physician/advanced practitioner order or complex needs related to functional status, cognitive ability, or social support system  Outcome: Progressing     Problem: GENITOURINARY - ADULT  Goal: Maintains or returns to baseline urinary function  Description: INTERVENTIONS:  - Assess urinary function  - Encourage oral fluids to ensure adequate hydration if ordered  - Administer IV fluids as ordered to ensure adequate hydration  - Administer ordered medications as needed  - Offer frequent toileting  - Follow urinary retention protocol if ordered  Outcome: Progressing  Goal: Absence of urinary retention  Description: INTERVENTIONS:  - Assess patient’s ability to void and empty bladder  - Monitor I/O  - Bladder scan as needed  - Discuss with physician/AP medications to alleviate retention as needed  - Discuss catheterization for  long term situations as appropriate  Outcome: Progressing

## 2024-10-24 NOTE — ANESTHESIA PREPROCEDURE EVALUATION
Procedure:  CYSTOSCOPY URETEROSCOPY WITH LITHOTRIPSY HOLMIUM LASER, RETROGRADE PYELOGRAM AND INSERTION STENT URETERAL (Left: Bladder)    Relevant Problems   ANESTHESIA (within normal limits)      CARDIO  This result has an attachment that is not available.   ·  Stress Findings: A pharmacological stress test was performed using 0.4   mg of regadenoson IV push over 10 seconds. Medication was administered for   10 sec minute(s).   ·  Stress Findings: The patient reached the end of the protocol.   ·  Stress ECG: No ST deviation was noted.     Nuclear portion reported by radiology     Resting ECG   Resting ECG shows no ST-segment deviation. The ECG shows normal sinus rhythm. The ECG axis is normal. normal intervals, The ECG has poor R-wave progression.     Stress Findings   A pharmacological stress test was performed using 0.4 mg of regadenoson IV push over 10 seconds. Medication was administered for 10 sec minute(s). The patient had a maximal HR of 96 bpm (65 % of MPHR) METS. The patient reached the end of the protocol. The patient reported no symptoms during the stress test. Blood pressure demonstrated a normal response and heart rate demonstrated a normal response to stress.     Stress ECG   No ST deviation was noted. Arrhythmias during stress: rare PACs, rare PVCs. There were no arrhythmias during recovery. The ECG was not diagnostic        (+) Hyperlipidemia      ENDO (within normal limits)      /RENAL   (+) Benign localized hyperplasia of prostate with urinary obstruction and lower urinary tract symptoms   (+) Calculus of kidney      MUSCULOSKELETAL   (+) DDD (degenerative disc disease), cervical   (+) DDD (degenerative disc disease), lumbosacral      PULMONARY (within normal limits)      Neurology/Sleep   (+) Peripheral sensory-motor axonal polyneuropathy      Orthopedic/Musculoskeletal   (+) Tear of right rotator cuff      Other   (+) History of renal stent        Physical Exam    Airway    Mallampati score:  II         Dental   No notable dental hx     Cardiovascular  Rhythm: regular, Rate: normal    Pulmonary   Breath sounds clear to auscultation    Other Findings        Anesthesia Plan  ASA Score- 2     Anesthesia Type- general with ASA Monitors.         Additional Monitors:     Airway Plan: LMA.           Plan Factors-Exercise tolerance (METS): >4 METS.    Chart reviewed.   Existing labs reviewed. Patient summary reviewed.    Patient is not a current smoker.      Obstructive sleep apnea risk education given perioperatively.        Induction- intravenous.    Postoperative Plan- Plan for postoperative opioid use.     Perioperative Resuscitation Plan - Level 1 - Full Code.       Informed Consent- Anesthetic plan and risks discussed with patient.

## 2024-10-24 NOTE — ASSESSMENT & PLAN NOTE
Takes Gabapentin 100 mg TID as needed   Currently denies need for medication     Plan:  Provide gabapentin as needed while inpatient

## 2024-10-24 NOTE — H&P
H&P - Urology   Juan Mike 1950, 74 y.o. male MRN: [id number]    Unit/Bed#: ED-11 Encounter: [id number]      Calculus of kidney  Assessment & Plan  History of nephrolithiasis requiring prior surgical intervention  Last episode > 5 years ago   Presented to [facility] ED 10/24 with new onset left flank pain  VSS, afebrile  WBC stable  SARAH with creatinine at 1.99 (baseline ~ 1.00)   Reports urinating normally, no difficulty emptying  UA shows no signs of infection, negative nitrites, negative leukocytes    Plan:   Maintain NPO status  Case request placed for left URS, laser lithotripsy add-on this afternoon with Dr. Ramos  Surgical consent signed at bedside, given to OR holding   Admitted to our service for planned surgical intervention later this evening  Home medications re-ordered for post-op needs    Peripheral sensory-motor axonal polyneuropathy  Assessment & Plan  Takes Gabapentin 100 mg TID as needed   Currently denies need for medication     Plan:  Provide gabapentin as needed while inpatient     Benign localized hyperplasia of prostate with urinary obstruction and lower urinary tract symptoms  Assessment & Plan  On flomax 0.4 mg daily for BPH with urinary frequency   Follows with our team in the outpatient setting - Dr. Banks     Plan:  Continue flomax as prescribed post-op  Return to office as scheduled 11/4 for cystoscopy     Hyperlipidemia  Assessment & Plan  Patient reports takes Rosuvastatin 40 mg daily and Amlodipine 5mg daily   Has NOT taken either medication today     Plan:  Continue Atorvastatin 40 mg daily while inpatient  Amlodipine 5mg daily reordered         Subjective:   Juan is a 74-year-old male with history of nephrolithiasis, HLD, BPH and peripheral polyneuropathy -- who presented to [facility] ED 10/24 with reports of left flank pain since last night.  VSS, afebrile at presentation.  No sign of leukocytosis.  Creatinine is elevated at 1.99 -- 0.99 two weeks ago.  CT stone study reveals  obstructing 6 mm left ureteral stone causing moderate upstream hydronephrosis.  UA negative for nitrates and leukocytes.  Possible distal 1.5 mm stone also in the left ureter.  Patient last ate at 7 PM yesterday.  He did not take any of his at home medications today. Maintain n.p.o. status.  Plan for cystoscopy, left ureteroscopy, laser lithotripsy, left ureteral stent placement this afternoon by Dr. Ramos.  Case discussed with patient including procedure description, possible complications, and postoperative considerations.  Patient wishes to proceed with surgery. Surgical consent signed at bedside and given to OR holding.  Patient will be kept on our service for ongoing observation, plan for discharge either later tonight or tomorrow morning by our team.    Review of Systems   Constitutional:  Negative for activity change, chills, fatigue and fever.   Respiratory:  Negative for apnea, cough and shortness of breath.    Cardiovascular:  Negative for chest pain and leg swelling.   Gastrointestinal:  Negative for abdominal distention, abdominal pain, constipation, diarrhea, nausea and vomiting.   Genitourinary:  Positive for flank pain (left). Negative for decreased urine volume, difficulty urinating, dysuria, frequency, hematuria, penile pain, testicular pain and urgency.   Neurological:  Negative for dizziness and headaches.   Psychiatric/Behavioral: Negative.         Objective:  Vitals: Blood pressure (!) 178/99, pulse 68, temperature 97.5 °F (36.4 °C), temperature source Oral, resp. rate 18, weight 70.8 kg (156 lb 1.4 oz), SpO2 97%.,Body mass index is 23.73 kg/m².    Physical Exam  Vitals and nursing note reviewed.   Constitutional:       General: He is not in acute distress.     Appearance: Normal appearance. He is not ill-appearing.   HENT:      Head: Normocephalic and atraumatic.      Mouth/Throat:      Pharynx: Oropharynx is clear.   Eyes:      Extraocular Movements: Extraocular movements intact.       Conjunctiva/sclera: Conjunctivae normal.   Cardiovascular:      Rate and Rhythm: Normal rate.   Pulmonary:      Effort: Pulmonary effort is normal.   Abdominal:      General: Abdomen is flat. There is no distension.      Palpations: Abdomen is soft.      Tenderness: There is no abdominal tenderness. There is right CVA tenderness. There is no left CVA tenderness.   Musculoskeletal:         General: Normal range of motion.      Cervical back: Normal range of motion.   Skin:     General: Skin is warm and dry.   Neurological:      General: No focal deficit present.      Mental Status: He is alert and oriented to person, place, and time.   Psychiatric:         Mood and Affect: Mood normal.         Behavior: Behavior normal.       Imaging:    CT ABDOMEN AND PELVIS WITHOUT IV CONTRAST - LOW DOSE RENAL STONE    INDICATION: Non-radiating left flank pain, hx of renal stones..    COMPARISON: CT chest abdomen pelvis 5/26/2023.    TECHNIQUE: Low radiation dose thin section CT examination of the abdomen and pelvis was performed without intravenous or oral contrast according to a protocol specifically designed to evaluate for urinary tract calculus. Axial, sagittal, and coronal 2D  reformatted images were created from the source data and submitted for interpretation. Evaluation for pathology in the abdomen and pelvis that is unrelated to urinary tract calculi is limited.    Radiation dose length product (DLP) for this visit: 383 mGy-cm . This examination, like all CT scans performed in the [facility], was performed utilizing techniques to minimize radiation dose exposure, including the use of iterative  reconstruction and automated exposure control.    URINARY TRACT FINDINGS:    RIGHT KIDNEY AND URETER: No hydronephrosis. Numerous (at least 6) renal calculi are seen measuring up to 3-4 mm in dimension. No evidence of ureteral calculi. The distal most portions of the right ureter are obscured due to shadowing from  bilateral hip  arthroplasties. Please note evaluation for solid renal lesions is limited on this noncontrast study.    LEFT KIDNEY AND URETER: There is a calculus within the distal left ureter measuring 6 mm in greatest dimension on both axial and coronal images (2:110, 601:89). Possible adjacent punctate calculus is suggested on coronal images measuring 1.5 mm (601:88).   There is moderate upstream hydronephrosis and hydroureter with perinephric stranding. The left kidney is enlarged as compared to the right. Additional punctate nonobstructing calculi versus vascular calcifications are seen within the left renal midpole  with the largest measuring 4 mm. The distal most portions of the ureter are obscured due to extensive streak artifact.    URINARY BLADDER: Portions of the urinary bladder are obscured due to extensive streak artifact. The visualized portions of the urinary bladder are grossly unremarkable.    ADDITIONAL FINDINGS:    LOWER CHEST: Dependent bibasilar scarring/atelectasis is noted.    SOLID VISCERA: Limited low radiation dose noncontrast CT evaluation demonstrates no clinically significant abnormality of the imaged portions of the spleen, pancreas, or adrenal glands. Mildly hypoattenuated area within segment 3 of the liver corresponds   to portal hepatic shunt which is better demonstrated on prior contrast-enhanced CT. The liver otherwise demonstrates an unremarkable unenhanced appearance.    GALLBLADDER/BILIARY TREE: No calcified gallstones. No pericholecystic inflammatory change. No biliary dilation.    STOMACH AND BOWEL: No evidence of small bowel obstruction. There is colonic diverticulosis without evidence of acute diverticulitis. There is mild wall thickening involving the sigmoid colon which may be attributed to incomplete distention versus wall  hypertrophy from chronic diverticular disease. Please note this does limit evaluation for underlying colonic lesions. Patient is status post right  hemicolectomy. Evaluation of the bowel is limited due to lack of IV or oral contrast    APPENDIX: Surgically absent.    ABDOMINOPELVIC CAVITY: No ascites. No pneumoperitoneum. No lymphadenopathy.    VESSELS: Atherosclerotic calcifications are noted within the abdominal aorta and branching vessels. Evaluation of the vasculature is limited due to lack of IV contrast.    REPRODUCTIVE ORGANS: Prostate is largely obscured by extensive streak artifact but appears enlarged    ABDOMINAL WALL/INGUINAL REGIONS: Small fat-containing left inguinal hernia is noted. The right inguinal canal is also mildly patulous and fat filled.    BONES: No acute fracture or suspicious osseous lesion. Spinal degenerative changes are noted. There is retrolisthesis of L4 on L5 which is stable. There are bilateral L5 pars interarticularis defects with a very mild grade 1 anterolisthesis of L5 on S1,  stable from May 2023. Bilateral hip arthroplasties are noted.   Impression:       1. Obstructing calculus measuring 6 mm within the distal left ureter resulting in moderate upstream hydronephrosis/hydroureter and perinephric fat stranding. Possible adjacent punctate ureteral calculus is seen the distal ureter measuring 1.5 mm. There  is limited visualization of the pelvic structures including the bladder, distal ureters, and prostate secondary to extensive streak artifact from hip arthroplasties.    2. Multiple nonobstructing right renal calculi and punctate nonobstructing calculi versus vascular calcifications within the left kidney.    3. Colonic diverticulosis without evidence of acute diverticulitis. Mild circumferential wall thickening within the sigmoid colon likely represents a combination of incomplete distention and/or wall hypertrophy from chronic diverticular disease. Please  note this does limit evaluation of the sigmoid colon and correlation with screening colonoscopy history is recommended.     Imaging reviewed - both report and images  personally reviewed.     Labs:  Recent Labs     10/24/24  0919   WBC 8.78     Recent Labs     10/24/24  0919   HGB 14.9       Recent Labs     10/24/24  0919   CREATININE 1.99*       History:  Social History     Socioeconomic History    Marital status: /Civil Union     Spouse name: None    Number of children: None    Years of education: None    Highest education level: None   Occupational History    None   Tobacco Use    Smoking status: Former     Current packs/day: 0.00     Types: Cigarettes     Quit date: 1983     Years since quittin.4    Smokeless tobacco: Never   Vaping Use    Vaping status: Never Used   Substance and Sexual Activity    Alcohol use: Yes     Comment: Occasionally    Drug use: No    Sexual activity: None   Other Topics Concern    None   Social History Narrative    None     Social Determinants of Health     Financial Resource Strain: Low Risk  (2023)    Received from [facility]    Overall Financial Resource Strain (CARDIA)     Difficulty of Paying Living Expenses: Not hard at all   Food Insecurity: No Food Insecurity (2023)    Received from [facility]    Hunger Vital Sign     Worried About Running Out of Food in the Last Year: Never true     Ran Out of Food in the Last Year: Never true   Transportation Needs: No Transportation Needs (2023)    Received from [facility]    PRAPARE - Transportation     Lack of Transportation (Medical): No     Lack of Transportation (Non-Medical): No   Physical Activity: Not on file   Stress: No Stress Concern Present (2023)    Received from [facility]    [place] [employer] - Occupational Stress Questionnaire     Feeling of Stress : Only a little   Social Connections: Unknown (2023)    Received from [facility]    Social Connection and Isolation Panel [NHANES]     Frequency of Communication with Friends and Family: Once  a week     Frequency of Social Gatherings with Friends and Family: Not on file     Attends Temple Services: More than 4 times per year     Active Member of Clubs or Organizations: No     Attends Club or Organization Meetings: Never     Marital Status:    Intimate Partner Violence: Not At Risk (7/13/2023)    Received from [facility]    Humiliation, Afraid, Rape, and Kick questionnaire     Fear of Current or Ex-Partner: No     Emotionally Abused: No     Physically Abused: No     Sexually Abused: No   Housing Stability: Not on file       Past Medical History:   Diagnosis Date    Bladder neck contracture     BPH with obstruction/lower urinary tract symptoms     BPH with obstruction/lower urinary tract symptoms 04/27/2017    Last Assessment & Plan:  Off flomax s/p turp 2017 upto date with Dr. Langston    Cancer (HCC)     colon cancer    DDD (degenerative disc disease), cervical     DDD (degenerative disc disease), lumbosacral     Elevated PSA     Elevated PSA 03/05/2018    Elevated serum creatinine 06/22/2018    Hydronephrosis     Hyperlipidemia     Influenza 01/20/2018    Last Assessment & Plan:  Suspect based on Sx Rest and fluids  Begin:    Kidney stone     in past    Nocturia     Wears glasses      Past Surgical History:   Procedure Laterality Date    BACK SURGERY      lumbar x2    COLON SURGERY      COLONOSCOPY      CYSTOSCOPY      CYSTOSCOPY W/ RETROGRADES      EXTRACORPOREAL SHOCK WAVE LITHOTRIPSY      Renal    INGUINAL HERNIA REPAIR Bilateral     MOHS SURGERY  05/2023    nose    ID CYSTO/URETERO W/LITHOTRIPSY &INDWELL STENT INSRT Right 06/07/2018    Procedure: CYSTOSCOPY, RIGHT RETROGRADE PYELOGRAM, RIGHT URETEROSCOPY, LASER LITHOTRIPSY, STENT INSERTION;  Surgeon: Shin Banks MD;  Location: [facility];  Service: Urology    SHOULDER SURGERY Bilateral     rotator cuff    TOTAL HIP ARTHROPLASTY Bilateral     TRANSURETHRAL RESECTION OF PROSTATE       Family History   Problem Relation  Age of Onset    Cancer Father     Breast cancer Mother        Shauna Pickard PA-C  Date: 10/24/2024 Time: 1:33 PM

## 2024-10-24 NOTE — ASSESSMENT & PLAN NOTE
History of nephrolithiasis requiring prior surgical intervention  Last episode > 5 years ago   Presented to [facility] ED 10/24 with new onset left flank pain  VSS, afebrile  WBC stable  SARAH with creatinine at 1.99 (baseline ~ 1.00)   Reports urinating normally, no difficulty emptying  UA shows no signs of infection, negative nitrites, negative leukocytes    Plan:   Maintain NPO status  Case request placed for left URS, laser lithotripsy add-on this afternoon with Dr. Ramos  Surgical consent signed at bedside, given to OR holding   Admitted to our service for planned surgical intervention later this evening  Home medications re-ordered for post-op needs

## 2024-10-24 NOTE — ED PROVIDER NOTES
Time reflects when diagnosis was documented in both MDM as applicable and the Disposition within this note       Time User Action Codes Description Comment    10/24/2024 12:29 PM Shauna Pickard [N20.0] Calculus of kidney     10/24/2024 12:58 PM Rafael Colindres [N20.1] Left ureteral stone     10/24/2024 12:58 PM Rafael Colindres [N17.9] SARAH (acute kidney injury) (HCC)     10/24/2024 12:59 PM Rafael Colindres [K57.90] Diverticulosis           ED Disposition       ED Disposition   Admit    Condition   Stable    Date/Time   Thu Oct 24, 2024  1:00 PM    Comment   Case was discussed with Shauna Pickard and the patient's admission status was agreed to be Admission Status: observation status to the service of Dr. Ramos.               Assessment & Plan       Medical Decision Making  74-year-old male presents the ED for evaluation of left flank pain that began last night.  Patient does have an extensive history of renal stones.  Afebrile in ED with normal vital signs.  Urinalysis showed no signs of acute UTI.  CT renal stone study concerning for 6 mm obstructing stone, possible additional 1.5 mm obstructing stone.  Case was discussed with urology was agreeable to obs admission to their service, likely will take patient to the OR today or tomorrow.  See ED course for additional details.    Amount and/or Complexity of Data Reviewed  Labs: ordered. Decision-making details documented in ED Course.  Radiology: ordered. Decision-making details documented in ED Course.    Risk  Prescription drug management.  Decision regarding hospitalization.        ED Course as of 10/24/24 1703   Thu Oct 24, 2024   1018 Sodium: 142   1026 Creatinine(!): 1.99  Creatinine 0.99 two months prior.  Concerning for SARAH, IV fluids in progress.  Will plan for admission.   1030 Potassium(!): 3.3   1112 CT renal stone study abdomen pelvis without contrast  IMPRESSION:     1. Obstructing calculus measuring 6 mm within the distal left ureter  resulting in moderate upstream hydronephrosis/hydroureter and perinephric fat stranding. Possible adjacent punctate ureteral calculus is seen the distal ureter measuring 1.5 mm. There   is limited visualization of the pelvic structures including the bladder, distal ureters, and prostate secondary to extensive streak artifact from hip arthroplasties.     2. Multiple nonobstructing right renal calculi and punctate nonobstructing calculi versus vascular calcifications within the left kidney.     3. Colonic diverticulosis without evidence of acute diverticulitis. Mild circumferential wall thickening within the sigmoid colon likely represents a combination of incomplete distention and/or wall hypertrophy from chronic diverticular disease. Please   note this does limit evaluation of the sigmoid colon and correlation with screening colonoscopy history is recommended.     1117 Urology paged.       Medications   amLODIPine (NORVASC) tablet 5 mg (has no administration in time range)   gabapentin (NEURONTIN) capsule 100 mg (has no administration in time range)   nortriptyline (PAMELOR) capsule 10 mg (has no administration in time range)   atorvastatin (LIPITOR) tablet 40 mg (has no administration in time range)   tamsulosin (FLOMAX) capsule 0.4 mg (has no administration in time range)   sodium chloride 0.9 % bolus 1,000 mL (0 mL Intravenous Stopped 10/24/24 1208)   ketorolac (TORADOL) injection 15 mg (15 mg Intravenous Given 10/24/24 0919)   potassium chloride 20 mEq IVPB (premix) (0 mEq Intravenous Stopped 10/24/24 1323)       ED Risk Strat Scores                           SBIRT 20yo+      Flowsheet Row Most Recent Value   Initial Alcohol Screen: US AUDIT-C     1. How often do you have a drink containing alcohol? 1 Filed at: 10/24/2024 0844   2. How many drinks containing alcohol do you have on a typical day you are drinking?  0 Filed at: 10/24/2024 0844   3a. Male UNDER 65: How often do you have five or more drinks on one  occasion? 0 Filed at: 10/24/2024 0844   3b. FEMALE Any Age, or MALE 65+: How often do you have 4 or more drinks on one occassion? 0 Filed at: 10/24/2024 0844   Audit-C Score 1 Filed at: 10/24/2024 0844   DANUTA: How many times in the past year have you...    Used an illegal drug or used a prescription medication for non-medical reasons? Never Filed at: 10/24/2024 0844                            History of Present Illness       Chief Complaint   Patient presents with    Flank Pain     Pt reports left flank pain since 11pm yesterday. Hx of kidney stones, states this feels the same        Past Medical History:   Diagnosis Date    Bladder neck contracture     BPH with obstruction/lower urinary tract symptoms     BPH with obstruction/lower urinary tract symptoms 04/27/2017    Last Assessment & Plan:  Off flomax s/p turp 2017 upto date with Dr. Langston    Cancer (HCC)     colon cancer    DDD (degenerative disc disease), cervical     DDD (degenerative disc disease), lumbosacral     Elevated PSA     Elevated PSA 03/05/2018    Elevated serum creatinine 06/22/2018    Hydronephrosis     Hyperlipidemia     Influenza 01/20/2018    Last Assessment & Plan:  Suspect based on Sx Rest and fluids  Begin:    Kidney stone     in past    Nocturia     Wears glasses       Past Surgical History:   Procedure Laterality Date    BACK SURGERY      lumbar x2    COLON SURGERY      COLONOSCOPY      CYSTOSCOPY      CYSTOSCOPY W/ RETROGRADES      EXTRACORPOREAL SHOCK WAVE LITHOTRIPSY      Renal    INGUINAL HERNIA REPAIR Bilateral     MOHS SURGERY  05/2023    nose    NH CYSTO/URETERO W/LITHOTRIPSY &INDWELL STENT INSRT Right 06/07/2018    Procedure: CYSTOSCOPY, RIGHT RETROGRADE PYELOGRAM, RIGHT URETEROSCOPY, LASER LITHOTRIPSY, STENT INSERTION;  Surgeon: Shin Banks MD;  Location: [facility];  Service: Urology    SHOULDER SURGERY Bilateral     rotator cuff    TOTAL HIP ARTHROPLASTY Bilateral     TRANSURETHRAL RESECTION OF PROSTATE        Family  History   Problem Relation Age of Onset    Cancer Father     Breast cancer Mother       Social History     Tobacco Use    Smoking status: Former     Current packs/day: 0.00     Types: Cigarettes     Quit date: 1983     Years since quittin.4    Smokeless tobacco: Never   Vaping Use    Vaping status: Never Used   Substance Use Topics    Alcohol use: Never     Comment: Occasionally    Drug use: No      E-Cigarette/Vaping    E-Cigarette Use Never User       E-Cigarette/Vaping Substances    Nicotine No     THC No     CBD No     Flavoring No     Other No     Unknown No       I have reviewed and agree with the history as documented.     This is a 74-year-old male with a medical history significant for BPH, HLD, colon cancer in remission, renal stones who presents to the ED for evaluation of left flank pain.  Patient reports symptoms began last night.  Reports nonradiating left flank pain and describes it as shooting sensation, states will wax and wane in severity, states this does feel similar to prior kidney stones.  He denies any associated nausea or vomiting, denies any abdominal pain or groin pain.  Denies any burning sensation with urination or hematuria.  Denies any other acute concerns or complaints at this time including recent fevers, chills, headaches, lightheadedness, chest pain, SOB, diarrhea.  He did not take anything at home for pain.  He states he did start Cymbalta approximately 2 weeks prior      Flank Pain  Associated symptoms: no chest pain, no chills, no cough, no diarrhea, no dysuria, no fever, no hematuria, no nausea, no shortness of breath and no vomiting        Review of Systems   Constitutional:  Negative for chills and fever.   HENT: Negative.     Eyes:  Negative for photophobia and visual disturbance.   Respiratory:  Negative for cough and shortness of breath.    Cardiovascular:  Negative for chest pain and palpitations.   Gastrointestinal:  Negative for abdominal pain, diarrhea, nausea  and vomiting.   Genitourinary:  Positive for flank pain. Negative for difficulty urinating, dysuria and hematuria.   Musculoskeletal:  Negative for neck pain and neck stiffness.   Neurological:  Negative for dizziness, syncope, light-headedness and headaches.           Objective       ED Triage Vitals   Temperature Pulse Blood Pressure Respirations SpO2 Patient Position - Orthostatic VS   10/24/24 0844 10/24/24 0844 10/24/24 0844 10/24/24 0844 10/24/24 0844 10/24/24 0844   97.5 °F (36.4 °C) 74 159/89 18 95 % Sitting      Temp Source Heart Rate Source BP Location FiO2 (%) Pain Score    10/24/24 0844 10/24/24 0844 10/24/24 0844 -- 10/24/24 0919    Oral Monitor Right arm  7      Vitals      Date and Time Temp Pulse SpO2 Resp BP Pain Score FACES Pain Rating User   10/24/24 1521 97.6 °F (36.4 °C) 64 98 % 14 155/92 -- -- DII   10/24/24 1356 97.5 °F (36.4 °C) 68 97 % 18 159/99 -- -- SV   10/24/24 1355 -- -- -- -- -- No Pain -- VH   10/24/24 1353 -- -- -- -- -- No Pain -- VH   10/24/24 1342 -- 78 97 % 16 156/95 -- -- LD   10/24/24 1116 -- 68 97 % 18 178/99 -- -- AEN   10/24/24 0919 -- -- -- -- -- 7 -- AEN   10/24/24 0844 97.5 °F (36.4 °C) 74 95 % 18 159/89 -- -- MARCELO            Physical Exam  Vitals and nursing note reviewed.   Constitutional:       General: He is not in acute distress.     Appearance: Normal appearance. He is well-developed. He is not toxic-appearing or diaphoretic.   HENT:      Head: Normocephalic and atraumatic.      Nose: No rhinorrhea.   Eyes:      Conjunctiva/sclera: Conjunctivae normal.   Cardiovascular:      Rate and Rhythm: Normal rate and regular rhythm.      Heart sounds: No murmur heard.  Pulmonary:      Effort: Pulmonary effort is normal. No respiratory distress.      Breath sounds: Normal breath sounds.   Abdominal:      General: There is no distension.      Palpations: Abdomen is soft.      Tenderness: There is no abdominal tenderness. There is left CVA tenderness. There is no right CVA  tenderness or guarding.   Musculoskeletal:         General: No swelling.      Cervical back: Normal range of motion and neck supple.   Skin:     General: Skin is warm and dry.      Capillary Refill: Capillary refill takes less than 2 seconds.   Neurological:      General: No focal deficit present.      Mental Status: He is alert and oriented to person, place, and time.   Psychiatric:         Mood and Affect: Mood normal.         Results Reviewed       Procedure Component Value Units Date/Time    UA w Reflex to Microscopic w Reflex to Culture [[id number]] Collected: 10/24/24 1026    Lab Status: Final result Specimen: Urine, Clean Catch Updated: 10/24/24 1037     Color, UA Colorless     Clarity, UA Clear     Specific Gravity, UA 1.008     pH, UA 7.0     Leukocytes, UA Negative     Nitrite, UA Negative     Protein, UA Negative mg/dl      Glucose, UA Negative mg/dl      Ketones, UA Negative mg/dl      Urobilinogen, UA <2.0 mg/dl      Bilirubin, UA Negative     Occult Blood, UA Negative    Basic metabolic panel [[id number]]  (Abnormal) Collected: 10/24/24 0919    Lab Status: Final result Specimen: Blood from Arm, Left Updated: 10/24/24 0948     Sodium 142 mmol/L      Potassium 3.3 mmol/L      Chloride 109 mmol/L      CO2 26 mmol/L      ANION GAP 7 mmol/L      BUN 33 mg/dL      Creatinine 1.99 mg/dL      Glucose 103 mg/dL      Calcium 9.2 mg/dL      eGFR 31 ml/min/1.73sq m     Narrative:      National Kidney Disease Foundation guidelines for Chronic Kidney Disease (CKD):     Stage 1 with normal or high GFR (GFR > 90 mL/min/1.73 square meters)    Stage 2 Mild CKD (GFR = 60-89 mL/min/1.73 square meters)    Stage 3A Moderate CKD (GFR = 45-59 mL/min/1.73 square meters)    Stage 3B Moderate CKD (GFR = 30-44 mL/min/1.73 square meters)    Stage 4 Severe CKD (GFR = 15-29 mL/min/1.73 square meters)    Stage 5 End Stage CKD (GFR <15 mL/min/1.73 square meters)  Note: GFR calculation is accurate only with a steady state creatinine     CBC and differential [[id number]]  (Abnormal) Collected: 10/24/24 0919    Lab Status: Final result Specimen: Blood from Arm, Left Updated: 10/24/24 0930     WBC 8.78 Thousand/uL      RBC 4.78 Million/uL      Hemoglobin 14.9 g/dL      Hematocrit 42.8 %      MCV 90 fL      MCH 31.2 pg      MCHC 34.8 g/dL      RDW 13.0 %      MPV 9.0 fL      Platelets 178 Thousands/uL      nRBC 0 /100 WBCs      Segmented % 74 %      Immature Grans % 1 %      Lymphocytes % 12 %      Monocytes % 12 %      Eosinophils Relative 1 %      Basophils Relative 0 %      Absolute Neutrophils 6.63 Thousands/µL      Absolute Immature Grans 0.04 Thousand/uL      Absolute Lymphocytes 1.01 Thousands/µL      Absolute Monocytes 1.03 Thousand/µL      Eosinophils Absolute 0.06 Thousand/µL      Basophils Absolute 0.01 Thousands/µL             CT renal stone study abdomen pelvis without contrast   Final Interpretation by Matt Beebe DO (10/24 1021)      1. Obstructing calculus measuring 6 mm within the distal left ureter resulting in moderate upstream hydronephrosis/hydroureter and perinephric fat stranding. Possible adjacent punctate ureteral calculus is seen the distal ureter measuring 1.5 mm. There    is limited visualization of the pelvic structures including the bladder, distal ureters, and prostate secondary to extensive streak artifact from hip arthroplasties.      2. Multiple nonobstructing right renal calculi and punctate nonobstructing calculi versus vascular calcifications within the left kidney.      3. Colonic diverticulosis without evidence of acute diverticulitis. Mild circumferential wall thickening within the sigmoid colon likely represents a combination of incomplete distention and/or wall hypertrophy from chronic diverticular disease. Please    note this does limit evaluation of the sigmoid colon and correlation with screening colonoscopy history is recommended.      The study was marked in EPIC for immediate notification.          Workstation performed: [id number]         FL < 1 hour    (Results Pending)       Procedures    ED Medication and Procedure Management   Prior to Admission Medications   Prescriptions Last Dose Informant Patient Reported? Taking?   amLODIPine (NORVASC) 5 mg tablet 10/23/2024  Yes Yes   gabapentin (NEURONTIN) 100 mg capsule 10/23/2024  Yes Yes   naproxen (NAPROSYN) 500 mg tablet   Yes No   Sig: Take 500 mg by mouth   nortriptyline (PAMELOR) 10 mg capsule 10/23/2024  Yes Yes   Sig: Take 10 mg by mouth   rosuvastatin (CRESTOR) 20 MG tablet 10/23/2024  Yes Yes   Sig: Take 20 mg by mouth daily   tamsulosin (FLOMAX) 0.4 mg 10/23/2024  No Yes   Sig: Take 1 capsule (0.4 mg total) by mouth daily with dinner      Facility-Administered Medications: None     Current Discharge Medication List        CONTINUE these medications which have NOT CHANGED    Details   amLODIPine (NORVASC) 5 mg tablet       gabapentin (NEURONTIN) 100 mg capsule       nortriptyline (PAMELOR) 10 mg capsule Take 10 mg by mouth      rosuvastatin (CRESTOR) 20 MG tablet Take 20 mg by mouth daily      tamsulosin (FLOMAX) 0.4 mg Take 1 capsule (0.4 mg total) by mouth daily with dinner  Qty: 100 capsule, Refills: 1    Associated Diagnoses: Benign localized hyperplasia of prostate with urinary obstruction and lower urinary tract symptoms      naproxen (NAPROSYN) 500 mg tablet Take 500 mg by mouth           No discharge procedures on file.  ED SEPSIS DOCUMENTATION   Time reflects when diagnosis was documented in both MDM as applicable and the Disposition within this note       Time User Action Codes Description Comment    10/24/2024 12:29 PM Shauna Pickard Add [N20.0] Calculus of kidney     10/24/2024 12:58 PM Rafael Colindres [N20.1] Left ureteral stone     10/24/2024 12:58 PM Rafael Colindres [N17.9] SARAH (acute kidney injury) (HCC)     10/24/2024 12:59 PM Rafael Colindres [K57.90] Diverticulosis                  Rafael Colindres PA-C  10/24/24 3954

## 2024-10-24 NOTE — OP NOTE
OPERATIVE REPORT  PATIENT NAME: Juan Mike    :  1950  MRN: [id number]  Pt Location: [facility]    SURGERY DATE: 10/24/2024    Surgeons and Role:     * Gareth Ramos MD - Primary    Preop Diagnosis:  Calculus of kidney [N20.0]    Post-Op Diagnosis Codes:     * Calculus of kidney [N20.0]    Procedure(s):  Left - CYSTOSCOPY URETEROSCOPY WITH LITHOTRIPSY HOLMIUM LASER. RETROGRADE PYELOGRAM. BASKETING AND INSERTION STENT URETERAL. URINE CULTURE    Specimen(s):  ID Type Source Tests Collected by Time Destination   A :  Urine Urine, Cystoscopic URINE CULTURE Gareth Ramos MD 10/24/2024 1823    B : LEFT URETERAL STONE Calculus Ureter, Left STONE ANALYSIS Gareth Ramos MD 10/24/2024 1830        Estimated Blood Loss:   Minimal    Drains:  Ureteral Drain/Stent Right ureter 6 Fr. (Active)   Number of days: 2331       Ureteral Internal Stent Left ureter 6 Fr. (Active)   Number of days: 0       Anesthesia Type:   General    Operative Indications:  Calculus of kidney [N20.0]      Operative Findings:  Large distal left ureteral calculus decimated with holmium laser lithotripsy.  Fragments removed with a [employer]yl[employer] basket.  Left 24-6 Kisw[place]ili double-J ureteral stent placed with string      Complications:   None    Procedure and Technique:      Procedure Description: Juan Mike is a 74 y.o.-year-old male with a history of left flank pain.  CT scan shows a large distal left ureteral calculus with left-sided hydronephrosis.     Risk and benefits of ureteroscopy were discussed and reviewed. Informed consent was obtained.    The patient was brought to the operating room on 2024. After the smooth induction of general LMA anesthesia, the patient was placed in the dorsal lithotomy position.  His genitalia was prepped and draped in a sterile fashion. Intravenous antibiotics were administered. A timeout was performed with all members of the operative team confirmed the patient's identity,  procedure to be performed, and laterality of the case.    A 22 [place] rigid cystoscope with 30° lens was inserted. The bladder was thoroughly inspected. There was no evidence of mucosal abnormalities or lesions. There were no calculi identified. Attention was focused on the left ureteral orifice. A 5 [place] open-ended catheter was inserted and a left retrograde pyelogram was performed. A filling defect in the distal left ureter was identified consistent with the stone. A wire was passed proximal to the stone and secured as a safety. A short semirigid ureteroscope was then inserted adjacent to the wire. The stone was identified and was engaged and decimated with a 365 µ holmium laser fiber. The fragments were removed with a [employer] basket. The ureteroscope was then repassed multiple times to ensure that the ureter was free and clear of any residual stones. Additional contrast was injected as a roadmap for stent insertion.     The ureteroscope was withdrawn.  The wire was backloaded through the cystoscope. The cystoscope was repassed into the bladder. A 24-6 [place] left double-J ureteral stent was then placed without difficulty. The proximal coil was appreciated in the left renal pelvis and the distal coil was visualized within the bladder. The bladder was emptied and the cystoscope was removed. A string was left in place and secured to the dorsum of the phallus with Tegaderm.    Overall the patient tolerated the procedure well and were no complications. The patient was extubated in the operating room and transferred to the PACU in stable condition at the conclusion of the case.      Patient Disposition:  PACU              SIGNATURE: Gareth Ramos MD  DATE: October 24, 2024  TIME: 6:45 PM

## 2024-10-24 NOTE — ASSESSMENT & PLAN NOTE
Patient reports takes Rosuvastatin 40 mg daily and Amlodipine 5mg daily   Has NOT taken either medication today     Plan:  Continue Atorvastatin 40 mg daily while inpatient  Amlodipine 5mg daily reordered

## 2024-10-25 ENCOUNTER — TELEPHONE (OUTPATIENT)
Dept: UROLOGY | Facility: CLINIC | Age: 74
End: 2024-10-25

## 2024-10-25 NOTE — DISCHARGE SUMMARY
Discharge Summary - Urology   Name: Juan Mike 74 y.o. male I MRN: [id number]  Unit/Bed#: E5 -01 I Date of Admission: 10/24/2024   Date of Service: 10/24/2024 I Hospital Day: 0    Discharge Summary - Juan Mike 74 y.o. male MRN: [id number]    Unit/Bed#: E5 -01 Encounter: [id number]    Admission Date: 10/24/2024     Discharge Date: 10/24/24    HPI: Juan Mike is a 74 y.o. male who presented for obstructing dstial left ureteral calculus.     Procedure(s):  CYSTOSCOPY URETEROSCOPY WITH LITHOTRIPSY HOLMIUM LASER, RETROGRADE PYELOGRAM, BASKETING AND INSERTION STENT URETERAL, URINE CULTURE AND STONE EXTRACTION  Surgeon(s):  Gareth Ramos MD  10/24/2024    Hospital Course: Patient presented with an obstructing dstial left ureteral calculus. Patients pain was uncontrolled and he had an elevation in Cr. Patient was admitted to urology service and went to the OR with Dr. Ramos for cystoscopy, ureteroscopy, holmium laser lithotripsy, retrograde pyelogram, insertion of left ureteral stent. Patient voiding, pain controlled and stable for discharge 10/24/2024.    Discharge Diagnosis: Left ureteral calculus    Condition at Discharge: good    Discharge Medications:  See after visit summary for reconciled discharge medications provided to patient and family.  Patient was discharged home on home medications with the addition of norco, cipro.     Discharge instructions/Information to patient and family:   See after visit summary for written and verbal information which has been provided to patient and family.      Provisions for Follow-Up Care:  See after visit summary for information related to follow-up care and any pertinent home health orders.      Disposition: Home    Planned Readmission: No    Discharge Statement   I spent 10 minutes discharging the patient. This time was spent on the day of discharge. I had direct contact with the patient on the day of discharge. Additional documentation is  required if more than 30 minutes were spent on discharge.     Signature:   Rita Gomez PA-C  Date: 10/24/2024 Time: 8:50 PM

## 2024-10-25 NOTE — DISCHARGE INSTR - AVS FIRST PAGE
Your surgery went very well.   You underwent ureteroscopy with basket extraction of stone and placement of a ureteral stent. .     Please remove your stent at home following the directions below on. Please remove your stent Sunday.        Please take your medications as prescribed with caution for comfort.  Most importantly please drink 6-8 glasses of water per day     Please call with any questions or concerns.                  WHAT IS A STENT?  At the end of the procedure, your doctor may place a stent into your ureter. A stent is a thin, flexible piece of plastic that will hold open your ureter while the remaining small pieces of stone pass. This allows your kidney to drain easily and prevents you from having to “pass” these small stone pieces on your own, which could be painful. The stent is about 12 inches long and looks and feels like a thin piece of spaghetti.     AFTER THE PROCEDURE  After the procedure you may experience the following symptoms. All of these are normal and should resolve within 1 or 2 days after your stent is removed.  Urinary frequency (urinating more often than usual)  Urinary urgency (the sensation that you need to urinate right away)  Painful urination (this can be pain in your bladder or in your back when  you urinate)  Blood in your urine ( a stent can irritate the lining of your bladder causing it to bleed)  Back/Flank pain, especially with urination  You will receive a prescription for narcotic pain medication after the procedure. You will also receive a prescription for tamsulosin which you will take once a day for 2 weeks to help relax your ureter and decrease stent discomfort. You will also need to purchase a stool softener (i.e. Colace) or mild laxative (i.e. Miralax) as the narcotic pain medication can make you constipated. This is important as constipation can exacerbate stent related symptoms.      STENT REMOVAL  In some cases, your doctor will leave strings attached to your  stent. The strings will be taped to your skin after the procedure. The strings will allow you to remove the thin flexible stent while you are at home. Normally, the stent can be removed 3-5 days after your procedure; your physician will tell you the specific date after your procedure.      On the day you are supposed to remove your stent, do the following:  When you wake up in the morning, take 1-2 pain pills with food.  Start your antibiotic pill the morning of schedule stent removal if prescribed  One hour later sit on the toilet or in the bath tub.  Take a deep breath in and while exhaling, pull the string.   Dispose of the stent in the garbage.     Alternatively, you will come back for an office procedure to remove the stent by placing a small camera into your bladder to remove the stent.     During the next 4-8 hours after removing your stent, you may experience additional blood in your urine, pain with urination or back/side pain. You should take the pain medication you were prescribed to help you with the pain, as well as continue the Flomax. If the pain is severe, you are vomiting, and/or have a fever > 101.4 please call the clinic.

## 2024-10-25 NOTE — TELEPHONE ENCOUNTER
Yes I would recommend patient keeping scheduled cystoscopy as it was to evaluate for prostate tissue regrowth d/t obstructive voiding symptoms.

## 2024-10-27 LAB — BACTERIA UR CULT: ABNORMAL

## 2024-10-28 NOTE — TELEPHONE ENCOUNTER
Call placed to pt and spoke with him. Informed him of the AP recommendations and plans for upcoming appointment.   Pt is aware and will proceed as planned with cysto

## 2024-11-01 LAB
CALCIUM OXALATE DIHYDRATE MFR STONE IR: 50 %
COLOR STONE: NORMAL
COM MFR STONE: 20 %
COMMENT-STONE3: NORMAL
COMPOSITION: NORMAL
HYDROXYAPATITE 24H ENGDIFF UR: 30 %
LABORATORY COMMENT REPORT: NORMAL
PHOTO: NORMAL
SIZE STONE: NORMAL MM
SPEC SOURCE SUBJ: NORMAL
STONE ANALYSIS-IMP: NORMAL
WT STONE: 14 MG

## 2024-11-04 ENCOUNTER — PROCEDURE VISIT (OUTPATIENT)
Dept: UROLOGY | Facility: MEDICAL CENTER | Age: 74
End: 2024-11-04
Payer: COMMERCIAL

## 2024-11-04 VITALS
BODY MASS INDEX: 23.52 KG/M2 | OXYGEN SATURATION: 96 % | WEIGHT: 155.2 LBS | HEART RATE: 77 BPM | HEIGHT: 68 IN | SYSTOLIC BLOOD PRESSURE: 112 MMHG | DIASTOLIC BLOOD PRESSURE: 70 MMHG

## 2024-11-04 DIAGNOSIS — N40.1 BENIGN LOCALIZED HYPERPLASIA OF PROSTATE WITH URINARY OBSTRUCTION AND LOWER URINARY TRACT SYMPTOMS: Primary | ICD-10-CM

## 2024-11-04 DIAGNOSIS — N13.9 BENIGN LOCALIZED HYPERPLASIA OF PROSTATE WITH URINARY OBSTRUCTION AND LOWER URINARY TRACT SYMPTOMS: Primary | ICD-10-CM

## 2024-11-04 LAB
SL AMB  POCT GLUCOSE, UA: NORMAL
SL AMB LEUKOCYTE ESTERASE,UA: NORMAL
SL AMB POCT BILIRUBIN,UA: NORMAL
SL AMB POCT BLOOD,UA: NORMAL
SL AMB POCT CLARITY,UA: CLEAR
SL AMB POCT COLOR,UA: YELLOW
SL AMB POCT KETONES,UA: NORMAL
SL AMB POCT NITRITE,UA: NORMAL
SL AMB POCT PH,UA: 6
SL AMB POCT SPECIFIC GRAVITY,UA: 1.01
SL AMB POCT URINE PROTEIN: NORMAL
SL AMB POCT UROBILINOGEN: 0.2

## 2024-11-04 PROCEDURE — 99214 OFFICE O/P EST MOD 30 MIN: CPT | Performed by: UROLOGY

## 2024-11-04 PROCEDURE — 52000 CYSTOURETHROSCOPY: CPT | Performed by: UROLOGY

## 2024-11-04 PROCEDURE — 81003 URINALYSIS AUTO W/O SCOPE: CPT | Performed by: UROLOGY

## 2024-11-04 RX ORDER — ROPINIROLE 0.5 MG/1
TABLET, FILM COATED ORAL
COMMUNITY
Start: 2024-09-13

## 2024-11-04 RX ORDER — DULOXETIN HYDROCHLORIDE 20 MG/1
1 CAPSULE, DELAYED RELEASE ORAL DAILY
COMMUNITY
Start: 2024-10-18

## 2024-11-04 RX ORDER — FINASTERIDE 5 MG/1
5 TABLET, FILM COATED ORAL DAILY
Qty: 90 TABLET | Refills: 3 | Status: SHIPPED | OUTPATIENT
Start: 2024-11-04

## 2024-11-04 NOTE — PROGRESS NOTES
Ambulatory Visit  Name: Juan Mike      : 1950      MRN: [id number]  Encounter Provider: Shin Banks MD  Encounter Date: 2024   Encounter department: [facility] UROLOGY [place]    Assessment & Plan  Benign localized hyperplasia of prostate with urinary obstruction and lower urinary tract symptoms  Options were discussed.  He will is already on tamsulosin.  AUA symptom score is 8.  He does wish his stream was better.  I recommended that we add finasteride to his regimen to shrink any prostatic regrowth.  Risks of medication were discussed.  Side effects discussed.  He understands the medication will not work quickly.  I will have him return in 6 months for follow-up.  Orders:    POCT urine dip auto non-scope    finasteride (PROSCAR) 5 mg tablet; Take 1 tablet (5 mg total) by mouth daily      History of Present Illness     Juan Mike is a 74 y.o. male who presents for follow up. He recently underwent ureteroscopy for a left ureteral stone.  The stent has been removed.  He was scheduled today for cystoscopy to evaluate obstructive lower urinary tract symptoms.  The patient did have a TURP in 2017 (BPH).  Reports stream is occasionally weak.  Nocturia 0-3 X.  No gross hematuria or symptoms of infection.  No urgency or incontinence.      Review of Systems   Constitutional:  Negative for chills, diaphoresis, fatigue and fever.   HENT: Negative.     Eyes: Negative.    Respiratory: Negative.     Cardiovascular: Negative.    Endocrine: Negative.    Genitourinary:         See HPI   Musculoskeletal: Negative.    Skin: Negative.    Allergic/Immunologic: Negative.    Neurological: Negative.    Hematological: Negative.    Psychiatric/Behavioral: Negative.             AUA SYMPTOM SCORE      Flowsheet Row Most Recent Value   AUA SYMPTOM SCORE    How often have you had a sensation of not emptying your bladder completely after you finished urinating? 1 (P)     How often have you had to urinate  "again less than two hours after you finished urinating? 2 (P)     How often have you found you stopped and started again several times when you urinate? 1 (P)     How often have you found it difficult to postpone urination? 1 (P)     How often have you had a weak urinary stream? 1 (P)     How often have you had to push or strain to begin urination? 0 (P)     How many times did you most typically get up to urinate from the time you went to bed at night until the time you got up in the morning? 2 (P)     Quality of Life: If you were to spend the rest of your life with your urinary condition just the way it is now, how would you feel about that? 2 (P)     AUA SYMPTOM SCORE 8 (P)            Objective     /70 (BP Location: Left arm, Patient Position: Sitting, Cuff Size: Adult)   Pulse 77   Ht 5' 8\" (1.727 m)   Wt 70.4 kg (155 lb 3.2 oz)   SpO2 96%   BMI 23.60 kg/m²   Physical Exam  Vitals reviewed.   Constitutional:       General: He is not in acute distress.     Appearance: Normal appearance. He is well-developed and normal weight. He is not ill-appearing, toxic-appearing or diaphoretic.   HENT:      Head: Normocephalic and atraumatic.   Eyes:      General: No scleral icterus.     Conjunctiva/sclera: Conjunctivae normal.   Cardiovascular:      Rate and Rhythm: Normal rate.   Pulmonary:      Effort: Pulmonary effort is normal.   Abdominal:      General: Abdomen is flat. There is no distension.      Palpations: There is no mass.      Tenderness: There is no abdominal tenderness. There is no right CVA tenderness, left CVA tenderness, guarding or rebound.      Hernia: No hernia is present.   Genitourinary:     Penis: Normal.       Testes: Normal.   Musculoskeletal:      Cervical back: Neck supple.   Skin:     General: Skin is warm and dry.   Neurological:      Mental Status: He is alert and oriented to person, place, and time.   Psychiatric:         Behavior: Behavior normal.         Thought Content: Thought " content normal.         Judgment: Judgment normal.       Results  Lab Results   Component Value Date    PSA 3.29 06/06/2024    PSA 2.80 06/05/2023    PSA 2.2 07/05/2022     Lab Results   Component Value Date    CALCIUM 9.2 10/24/2024    K 3.3 (L) 10/24/2024    CO2 26 10/24/2024     (H) 10/24/2024    BUN 33 (H) 10/24/2024    CREATININE 1.99 (H) 10/24/2024     Lab Results   Component Value Date    WBC 8.78 10/24/2024    HGB 14.9 10/24/2024    HCT 42.8 10/24/2024    MCV 90 10/24/2024     10/24/2024       Office Urine Dip  Recent Results (from the past 1 hour(s))   POCT urine dip auto non-scope    Collection Time: 11/04/24  1:05 PM   Result Value Ref Range     COLOR,UA yellow     CLARITY,UA clear     SPECIFIC GRAVITY,UA 1.015      PH,UA 6.0     LEUKOCYTE ESTERASE,UA neg     NITRITE,UA neg     GLUCOSE, UA neg     KETONES,UA neg     BILIRUBIN,UA neg     BLOOD,UA neg     POCT URINE PROTEIN neg     SL AMB POCT UROBILINOGEN 0.2       Cystoscopy     Date/Time  11/4/2024 1:00 PM     Performed by  Shin Banks MD   Authorized by  Shin Banks MD     [place] Protocol:  procedure performed by consultantConsent: Verbal consent obtained. Written consent obtained.  Risks and benefits: risks, benefits and alternatives were discussed  Consent given by: patient  Patient understanding: patient states understanding of the procedure being performed  Patient consent: the patient's understanding of the procedure matches consent given  Procedure consent: procedure consent matches procedure scheduled      Procedure Details:  Procedure type: cystoscopy    Patient tolerance: Patient tolerated the procedure well with no immediate complications    Additional Procedure Details:      Patient presents for cystoscopy.  I have discussed the reasons for doing the exam, and the potential risks and complications.  Patient expressed understanding, and signed informed consent document.    The patient was carefully  positioned supine  on the examining table.  Sterile preparation was performed on the urethra.  Xylocaine jelly was instilled and left  Indwelling for the procedure.  The 15 [place] flexible cystoscope was passed with the following findings:      Urethra: No stricture    Prostate:  lateral lobes bilobar prostatic regrowth approximately 30 g.  Median lobe has been resected.  No bladder neck contracture.                  Bladder: No mucosal lesion, orifices normal, tumor, or stones.     Residual urine: Small    Patient tolerated the procedure well and was escorted from the examining table.       Administrative Statements

## 2024-11-04 NOTE — PATIENT INSTRUCTIONS
Patient Education     Finasteride (ed rasmussen)   Brand Names: US Propecia; Proscar   Brand Names: Yao ACH-Finasteride; AG-Finasteride; APO-Finasteride; Auro-Finasteride; BIO-Finasteride; DOM-Finasteride [DSC]; JAMP-Finasteride; M-Finasteride; MINT-Finasteride; PMS-Finasteride; Propecia; Proscar; RAN-Finasteride [DSC]; ERICKSON-Finasteride; SANDOZ Finasteride; SANDOZ Finasteride A; TEVA-Finasteride   What is this drug used for?   It is used to help hair growth in male pattern baldness. It may take 3 months to see the full effect.  It is used to treat the signs of an enlarged prostate. It may take a few months to see the full effect.  It may be given to you for other reasons. Talk with the doctor.  What do I need to tell my doctor BEFORE I take this drug?   If you are allergic to this drug; any part of this drug; or any other drugs, foods, or substances. Tell your doctor about the allergy and what signs you had.  If you are able to get pregnant or breast-feed. This drug is not approved for use in these patients. If you are pregnant, plan on getting pregnant, or are breast-feeding, talk with your doctor.  This drug may interact with other drugs or health problems.  Tell your doctor and pharmacist about all of your drugs (prescription or OTC, natural products, vitamins) and health problems. You must check to make sure that it is safe for you to take this drug with all of your drugs and health problems. Do not start, stop, or change the dose of any drug without checking with your doctor.  What are some things I need to know or do while I take this drug?   For all uses of this drug:   Tell all of your health care providers that you take this drug. This includes your doctors, nurses, pharmacists, and dentists.  Avoid driving and doing other tasks or actions that call for you to be alert until you see how this drug affects you.  To lower the chance of feeling dizzy or passing out, rise slowly if you have been sitting  or lying down. Be careful going up and down stairs.  This drug may affect certain lab tests. Tell all of your health care providers and lab workers that you take this drug.  A very bad form of prostate cancer was seen more often in some men taking drugs like this one than in those who were not. This was seen in a trial of men taking these drugs to lower the chance of prostate cancer. Talk with your doctor.  Rarely, male breast cancer has happened with this drug. Talk with your doctor.  This drug may affect sperm. This may affect being able to father a child. Talk with the doctor.  Pregnant people must not handle crushed or broken tablets. If a pregnant person touches a crushed or broken tablet, wash the area with soap and water right away.  For enlarged prostate:   Have a rectal exam (to check prostate gland) and blood work (PSA test). Talk with your doctor.  What are some side effects that I need to call my doctor about right away?   WARNING/CAUTION: Even though it may be rare, some people may have very bad and sometimes deadly side effects when taking a drug. Tell your doctor or get medical help right away if you have any of the following signs or symptoms that may be related to a very bad side effect:  Signs of an allergic reaction, like rash; hives; itching; red, swollen, blistered, or peeling skin with or without fever; wheezing; tightness in the chest or throat; trouble breathing, swallowing, or talking; unusual hoarseness; or swelling of the mouth, face, lips, tongue, or throat.  A lump in the breast, breast pain or soreness, or nipple discharge.  Enlarged breasts.  Very bad dizziness or passing out.  What are some other side effects of this drug?   All drugs may cause side effects. However, many people have no side effects or only have minor side effects. Call your doctor or get medical help if any of these side effects or any other side effects bother you or do not go away:  Feeling tired or weak.  This  drug may cause lowered interest in sex, ejaculation problems, or trouble getting or keeping an erection. This could go on after you stop this drug. Talk with your doctor if these effects go on or bother you.  These are not all of the side effects that may occur. If you have questions about side effects, call your doctor. Call your doctor for medical advice about side effects.  You may report side effects to your national health agency.  You may report side effects to the FDA at 1-167.453.1363. You may also report side effects at https://www.fda.gov/medwatch.  How is this drug best taken?   Use this drug as ordered by your doctor. Read all information given to you. Follow all instructions closely.  Take with or without food.  Take this drug at the same time of day.  Keep taking this drug as you have been told by your doctor or other health care provider, even if you feel well.  What do I do if I miss a dose?   Skip the missed dose and go back to your normal time.  Do not take 2 doses at the same time or extra doses.  How do I store and/or throw out this drug?   Store tablets in the original container at room temperature. Keep the cap tightly closed.  Protect from light.  Store in a dry place. Do not store in a bathroom.  Keep all drugs in a safe place. Keep all drugs out of the reach of children and pets.  Throw away unused or  drugs. Do not flush down a toilet or pour down a drain unless you are told to do so. Check with your pharmacist if you have questions about the best way to throw out drugs. There may be drug take-back programs in your area.  General drug facts   If your symptoms or health problems do not get better or if they become worse, call your doctor.  Do not share your drugs with others and do not take anyone else's drugs.  Some drugs may have another patient information leaflet. If you have any questions about this drug, please talk with your doctor, nurse, pharmacist, or other health care  provider.  Some drugs may have another patient information leaflet. Check with your pharmacist. If you have any questions about this drug, please talk with your doctor, nurse, pharmacist, or other health care provider.  If you think there has been an overdose, call your poison control center or get medical care right away. Be ready to tell or show what was taken, how much, and when it happened.  Consumer Information Use and Disclaimer   This generalized information is a limited summary of diagnosis, treatment, and/or medication information. It is not meant to be comprehensive and should be used as a tool to help the user understand and/or assess potential diagnostic and treatment options. It does NOT include all information about conditions, treatments, medications, side effects, or risks that may apply to a specific patient. It is not intended to be medical advice or a substitute for the medical advice, diagnosis, or treatment of a health care provider based on the health care provider's examination and assessment of a patient's specific and unique circumstances. Patients must speak with a health care provider for complete information about their health, medical questions, and treatment options, including any risks or benefits regarding use of medications. This information does not endorse any treatments or medications as safe, effective, or approved for treating a specific patient. UpToDate, Inc. and its affiliates disclaim any warranty or liability relating to this information or the use thereof. The use of this information is governed by the Terms of Use, available at https://www.wolterscomment.comuwer.com/en/know/clinical-effectiveness-terms.  Last Reviewed Date   2023-12-11  Copyright   © 2024 UpToDate, Inc. and its affiliates and/or licensors. All rights reserved.    Patient Education     Cystoscopy - Discharge instructions   The Basics   Written by the doctors and editors at [employer]   What are discharge  "instructions? -- Discharge instructions are information about how to take care of yourself after getting medical care for a health problem.  What is a cystoscopy? -- A cystoscopy is a procedure that lets a doctor see inside the bladder and urethra. The urethra is the tube that transports urine out of the bladder (figure 1). During cystoscopy, a doctor puts a thin tube with a tiny camera on the end into the urethra and moves it up into the bladder (figure 2). The tube is called a \"cystoscope.\"  How do I care for myself at home? -- Ask the doctor or nurse what you should do when you go home. Make sure that you understand exactly what you need to do to care for yourself. Ask questions if there is anything you do not understand.  It's important to drink plenty of water:   This helps flush out your bladder and relieves discomfort.   Drink at least 3 to 4 to glasses of water the first day after your procedure.   Urinate when you feel the need.  Some side effects are common after cystoscopy. These should only last for a short time after your procedure. They include:   Some pain or discomfort when urinating   A small amount of blood in your urine  These side effects should stop after you urinate a few times.  What follow-up care do I need? -- Your doctor or nurse will discuss the results of your cystoscopy with you.   If you had a biopsy, your doctor will let you know when your results are ready. They will talk to you about what they mean.   If your cystoscopy shows that you have a medical problem, your doctor will talk to you about your treatment options.  When should I call the doctor? -- Call for advice if you:   Have pain or discomfort when urinating for more than a day or 2   Have \"urinary urgency\" for more than a day or 2 - This is when you feel like you need to urinate suddenly or in a hurry.   See a lot of blood in your urine at once   Still see blood in your urine after 5 days   Have a fever   Have pain in your " belly  All topics are updated as new evidence becomes available and our peer review process is complete.  This topic retrieved from [employer] on: Apr 11, 2024.  Topic 339269 Version 1.0  Release: 32.3.2 - C32.100  © 2024 UpToDate, Inc. and/or its affiliates. All rights reserved.  figure 1: Anatomy of the urinary tract     Urine is made by the kidneys. It passes from the kidneys into the bladder through 2 tubes called the ureters. Then, it leaves the bladder through another tube called the urethra.  Graphic 76749 Version 8.0  figure 2: Cystoscopy     The doctor inserts a long, thin tube with a tiny camera on the end (cystoscope) into the urethra. Then, they thread the tube into the bladder to see inside.  Graphic 213566 Version 1.0  Consumer Information Use and Disclaimer   Disclaimer: This generalized information is a limited summary of diagnosis, treatment, and/or medication information. It is not meant to be comprehensive and should be used as a tool to help the user understand and/or assess potential diagnostic and treatment options. It does NOT include all information about conditions, treatments, medications, side effects, or risks that may apply to a specific patient. It is not intended to be medical advice or a substitute for the medical advice, diagnosis, or treatment of a health care provider based on the health care provider's examination and assessment of a patient's specific and unique circumstances. Patients must speak with a health care provider for complete information about their health, medical questions, and treatment options, including any risks or benefits regarding use of medications. This information does not endorse any treatments or medications as safe, effective, or approved for treating a specific patient. UpToDate, Inc. and its affiliates disclaim any warranty or liability relating to this information or the use thereof.The use of this information is governed by the Terms of Use, available  at https://www.woltersI Am Advertisinguwer.com/en/know/clinical-effectiveness-terms. 2024© [employer] and its affiliates and/or licensors. All rights reserved.  Copyright   © 2024 [employer] and/or its affiliates. All rights reserved.

## 2024-11-04 NOTE — ASSESSMENT & PLAN NOTE
Options were discussed.  He will is already on tamsulosin.  AUA symptom score is 8.  He does wish his stream was better.  I recommended that we add finasteride to his regimen to shrink any prostatic regrowth.  Risks of medication were discussed.  Side effects discussed.  He understands the medication will not work quickly.  I will have him return in 6 months for follow-up.  Orders:    POCT urine dip auto non-scope    finasteride (PROSCAR) 5 mg tablet; Take 1 tablet (5 mg total) by mouth daily

## 2024-11-04 NOTE — LETTER
2024     Isidoro Wetzel DO  [address]  [address]  [facility] [address]    Patient: Juan Mike   YOB: 1950   Date of Visit: 2024       Dear Dr. Wetzel:    Thank you for referring Juan Mike to me for evaluation. Below are my notes for this consultation.    If you have questions, please do not hesitate to call me. I look forward to following your patient along with you.         Sincerely,        Shin Banks MD        CC: No Recipients    Shin Banks MD  2024  1:33 PM  Sign when Signing Visit  Ambulatory Visit  Name: Juan Mike      : 1950      MRN: [id number]  Encounter Provider: Shin Banks MD  Encounter Date: 2024   Encounter department: [facility] [place]    Assessment & Plan  Benign localized hyperplasia of prostate with urinary obstruction and lower urinary tract symptoms  Options were discussed.  He will is already on tamsulosin.  AUA symptom score is 8.  He does wish his stream was better.  I recommended that we add finasteride to his regimen to shrink any prostatic regrowth.  Risks of medication were discussed.  Side effects discussed.  He understands the medication will not work quickly.  I will have him return in 6 months for follow-up.  Orders:  •  POCT urine dip auto non-scope  •  finasteride (PROSCAR) 5 mg tablet; Take 1 tablet (5 mg total) by mouth daily      History of Present Illness    Juan Mike is a 74 y.o. male who presents for follow up. He recently underwent ureteroscopy for a left ureteral stone.  The stent has been removed.  He was scheduled today for cystoscopy to evaluate obstructive lower urinary tract symptoms.  The patient did have a TURP in 2017 (BPH).  Reports stream is occasionally weak.  Nocturia 0-3 X.  No gross hematuria or symptoms of infection.  No urgency or incontinence.      Review of Systems   Constitutional:  Negative for chills, diaphoresis, fatigue and fever.  "  HENT: Negative.     Eyes: Negative.    Respiratory: Negative.     Cardiovascular: Negative.    Endocrine: Negative.    Genitourinary:         See HPI   Musculoskeletal: Negative.    Skin: Negative.    Allergic/Immunologic: Negative.    Neurological: Negative.    Hematological: Negative.    Psychiatric/Behavioral: Negative.             AUA SYMPTOM SCORE      Flowsheet Row Most Recent Value   AUA SYMPTOM SCORE    How often have you had a sensation of not emptying your bladder completely after you finished urinating? 1 (P)     How often have you had to urinate again less than two hours after you finished urinating? 2 (P)     How often have you found you stopped and started again several times when you urinate? 1 (P)     How often have you found it difficult to postpone urination? 1 (P)     How often have you had a weak urinary stream? 1 (P)     How often have you had to push or strain to begin urination? 0 (P)     How many times did you most typically get up to urinate from the time you went to bed at night until the time you got up in the morning? 2 (P)     Quality of Life: If you were to spend the rest of your life with your urinary condition just the way it is now, how would you feel about that? 2 (P)     AUA SYMPTOM SCORE 8 (P)            Objective    /70 (BP Location: Left arm, Patient Position: Sitting, Cuff Size: Adult)   Pulse 77   Ht 5' 8\" (1.727 m)   Wt 70.4 kg (155 lb 3.2 oz)   SpO2 96%   BMI 23.60 kg/m²   Physical Exam  Vitals reviewed.   Constitutional:       General: He is not in acute distress.     Appearance: Normal appearance. He is well-developed and normal weight. He is not ill-appearing, toxic-appearing or diaphoretic.   HENT:      Head: Normocephalic and atraumatic.   Eyes:      General: No scleral icterus.     Conjunctiva/sclera: Conjunctivae normal.   Cardiovascular:      Rate and Rhythm: Normal rate.   Pulmonary:      Effort: Pulmonary effort is normal.   Abdominal:      General: " Abdomen is flat. There is no distension.      Palpations: There is no mass.      Tenderness: There is no abdominal tenderness. There is no right CVA tenderness, left CVA tenderness, guarding or rebound.      Hernia: No hernia is present.   Genitourinary:     Penis: Normal.       Testes: Normal.   Musculoskeletal:      Cervical back: Neck supple.   Skin:     General: Skin is warm and dry.   Neurological:      Mental Status: He is alert and oriented to person, place, and time.   Psychiatric:         Behavior: Behavior normal.         Thought Content: Thought content normal.         Judgment: Judgment normal.       Results  Lab Results   Component Value Date    PSA 3.29 06/06/2024    PSA 2.80 06/05/2023    PSA 2.2 07/05/2022     Lab Results   Component Value Date    CALCIUM 9.2 10/24/2024    K 3.3 (L) 10/24/2024    CO2 26 10/24/2024     (H) 10/24/2024    BUN 33 (H) 10/24/2024    CREATININE 1.99 (H) 10/24/2024     Lab Results   Component Value Date    WBC 8.78 10/24/2024    HGB 14.9 10/24/2024    HCT 42.8 10/24/2024    MCV 90 10/24/2024     10/24/2024       Office Urine Dip  Recent Results (from the past 1 hour(s))   POCT urine dip auto non-scope    Collection Time: 11/04/24  1:05 PM   Result Value Ref Range     COLOR,UA yellow     CLARITY,UA clear     SPECIFIC GRAVITY,UA 1.015      PH,UA 6.0     LEUKOCYTE ESTERASE,UA neg     NITRITE,UA neg     GLUCOSE, UA neg     KETONES,UA neg     BILIRUBIN,UA neg     BLOOD,UA neg     POCT URINE PROTEIN neg     SL AMB POCT UROBILINOGEN 0.2       Cystoscopy     Date/Time  11/4/2024 1:00 PM     Performed by  Shin Banks MD   Authorized by  Shin Banks MD     [place] Protocol:  procedure performed by consultantConsent: Verbal consent obtained. Written consent obtained.  Risks and benefits: risks, benefits and alternatives were discussed  Consent given by: patient  Patient understanding: patient states understanding of the procedure being performed  Patient  consent: the patient's understanding of the procedure matches consent given  Procedure consent: procedure consent matches procedure scheduled      Procedure Details:  Procedure type: cystoscopy    Patient tolerance: Patient tolerated the procedure well with no immediate complications    Additional Procedure Details:      Patient presents for cystoscopy.  I have discussed the reasons for doing the exam, and the potential risks and complications.  Patient expressed understanding, and signed informed consent document.    The patient was carefully  positioned supine on the examining table.  Sterile preparation was performed on the urethra.  Xylocaine jelly was instilled and left  Indwelling for the procedure.  The 15 Khmer flexible cystoscope was passed with the following findings:      Urethra: No stricture    Prostate:  lateral lobes bilobar prostatic regrowth approximately 30 g.  Median lobe has been resected.  No bladder neck contracture.                  Bladder: No mucosal lesion, orifices normal, tumor, or stones.     Residual urine: Small    Patient tolerated the procedure well and was escorted from the examining table.       Administrative Statements

## 2024-12-13 DIAGNOSIS — N40.1 BENIGN LOCALIZED HYPERPLASIA OF PROSTATE WITH URINARY OBSTRUCTION AND LOWER URINARY TRACT SYMPTOMS: ICD-10-CM

## 2024-12-13 DIAGNOSIS — N13.9 BENIGN LOCALIZED HYPERPLASIA OF PROSTATE WITH URINARY OBSTRUCTION AND LOWER URINARY TRACT SYMPTOMS: ICD-10-CM

## 2024-12-13 RX ORDER — TAMSULOSIN HYDROCHLORIDE 0.4 MG/1
0.4 CAPSULE ORAL
Qty: 90 CAPSULE | Refills: 1 | Status: SHIPPED | OUTPATIENT
Start: 2024-12-13

## 2024-12-13 NOTE — TELEPHONE ENCOUNTER
Reason for call:   [x] Refill   [] Prior Auth  [] Other:     Office:   [] PCP/Provider -   [x] Specialty/Provider - Urology     Medication: Tamsulosin     Dose/Frequency: 0.4 mg capsule taken by mouth once daily with dinner     Quantity: 100    Pharmacy: [place] [place] - [address] [place] [address] [phone]     Does the patient have enough for 3 days?   [] Yes   [x] No - Send as HP to POD

## 2025-04-11 ENCOUNTER — OFFICE VISIT (OUTPATIENT)
Dept: UROLOGY | Facility: MEDICAL CENTER | Age: 75
End: 2025-04-11
Payer: COMMERCIAL

## 2025-04-11 VITALS
HEIGHT: 68 IN | HEART RATE: 79 BPM | SYSTOLIC BLOOD PRESSURE: 130 MMHG | DIASTOLIC BLOOD PRESSURE: 80 MMHG | OXYGEN SATURATION: 95 % | BODY MASS INDEX: 24.25 KG/M2 | WEIGHT: 160 LBS

## 2025-04-11 DIAGNOSIS — N20.0 CALCULUS OF KIDNEY: ICD-10-CM

## 2025-04-11 DIAGNOSIS — N13.9 BENIGN LOCALIZED HYPERPLASIA OF PROSTATE WITH URINARY OBSTRUCTION AND LOWER URINARY TRACT SYMPTOMS: Primary | ICD-10-CM

## 2025-04-11 DIAGNOSIS — N40.1 BENIGN LOCALIZED HYPERPLASIA OF PROSTATE WITH URINARY OBSTRUCTION AND LOWER URINARY TRACT SYMPTOMS: Primary | ICD-10-CM

## 2025-04-11 PROBLEM — N20.1 LEFT URETERAL CALCULUS: Status: RESOLVED | Noted: 2024-10-24 | Resolved: 2025-04-11

## 2025-04-11 PROBLEM — IMO0002 HISTORY OF RENAL STENT: Status: RESOLVED | Noted: 2018-06-22 | Resolved: 2025-04-11

## 2025-04-11 LAB — POST-VOID RESIDUAL VOLUME, ML POC: 23 ML

## 2025-04-11 PROCEDURE — 99214 OFFICE O/P EST MOD 30 MIN: CPT | Performed by: UROLOGY

## 2025-04-11 PROCEDURE — 51798 US URINE CAPACITY MEASURE: CPT | Performed by: UROLOGY

## 2025-04-11 RX ORDER — TAMSULOSIN HYDROCHLORIDE 0.4 MG/1
0.4 CAPSULE ORAL
Qty: 90 CAPSULE | Refills: 3 | Status: SHIPPED | OUTPATIENT
Start: 2025-04-11

## 2025-04-11 NOTE — ASSESSMENT & PLAN NOTE
Multiple small right renal stones noted on CT in December 2024.  Presently asymptomatic.  Continue to follow.

## 2025-04-11 NOTE — PROGRESS NOTES
Name: Juan Mike      : 1950      MRN: [id number]  Encounter Provider: Shin Banks MD  Encounter Date: 2025   Encounter department: [facility] [place]  :  Assessment & Plan  Benign localized hyperplasia of prostate with urinary obstruction and lower urinary tract symptoms  He feels he is voiding better on combined medical therapy. He will remain on tamsulosin and finasteride.  AUA SS is now 6. PVR excellent at 23cc.  We will continue present therapy.  Orders:    POCT Measure PVR    tamsulosin (FLOMAX) 0.4 mg; Take 1 capsule (0.4 mg total) by mouth daily with dinner    Calculus of kidney  Multiple small right renal stones noted on CT in 2024.  Presently asymptomatic.  Continue to follow.           History of Present Illness   Juan Mike is a 75 y.o. male who presents for follow-up of his lower urinary tract symptoms.  He also has a history of kidney stones.  At his last visit finasteride was added to his medical regimen.  He has remote history of TURP and has documented prostatic regrowth.  He notes he is voiding better with the finasteride.  He is getting up twice a night to urinate.  Stream is adequate.  He feels he empties well.  There is no gross hematuria or symptoms of infection.  No urgency or incontinence.  No flank pain to suggest recurrent stone symptoms.  AUA SYMPTOM SCORE      Flowsheet Row Most Recent Value   AUA SYMPTOM SCORE    How often have you had a sensation of not emptying your bladder completely after you finished urinating? 0 (P)     How often have you had to urinate again less than two hours after you finished urinating? 1 (P)     How often have you found you stopped and started again several times when you urinate? 1 (P)     How often have you found it difficult to postpone urination? 1 (P)     How often have you had a weak urinary stream? 1 (P)     How often have you had to push or strain to begin urination? 0 (P)     How many times did  "you most typically get up to urinate from the time you went to bed at night until the time you got up in the morning? 2 (P)     Quality of Life: If you were to spend the rest of your life with your urinary condition just the way it is now, how would you feel about that? 2 (P)     AUA SYMPTOM SCORE 6 (P)            Review of Systems   Constitutional:  Negative for chills, diaphoresis, fatigue and fever.   HENT: Negative.     Eyes: Negative.    Respiratory: Negative.     Cardiovascular: Negative.    Endocrine: Negative.    Genitourinary:         See HPI   Musculoskeletal: Negative.    Skin: Negative.    Allergic/Immunologic: Negative.    Neurological: Negative.    Hematological: Negative.    Psychiatric/Behavioral: Negative.            Objective   /80 (BP Location: Left arm, Patient Position: Sitting, Cuff Size: Standard)   Pulse 79   Ht 5' 8\" (1.727 m)   Wt 72.6 kg (160 lb)   SpO2 95%   BMI 24.33 kg/m²     Physical Exam  Vitals reviewed.   Constitutional:       General: He is not in acute distress.     Appearance: Normal appearance. He is well-developed and normal weight. He is not ill-appearing, toxic-appearing or diaphoretic.   HENT:      Head: Normocephalic and atraumatic.   Eyes:      General: No scleral icterus.     Conjunctiva/sclera: Conjunctivae normal.   Cardiovascular:      Rate and Rhythm: Normal rate.   Pulmonary:      Effort: Pulmonary effort is normal.   Abdominal:      General: Bowel sounds are normal. There is no distension.      Palpations: Abdomen is soft. There is no mass.      Tenderness: There is no abdominal tenderness. There is no right CVA tenderness, left CVA tenderness, guarding or rebound.      Hernia: No hernia is present.   Genitourinary:     Penis: Normal. No phimosis or hypospadias.       Testes: Normal.         Right: Mass not present.         Left: Mass not present.      Rectum: Normal.      Comments: Prostate moderately enlarged and palpably benign.  Musculoskeletal:       "   General: Normal range of motion.      Cervical back: Neck supple.   Skin:     General: Skin is warm and dry.   Neurological:      General: No focal deficit present.      Mental Status: He is alert and oriented to person, place, and time.   Psychiatric:         Mood and Affect: Mood normal.         Behavior: Behavior normal.         Thought Content: Thought content normal.         Judgment: Judgment normal.         Results   Lab Results   Component Value Date    PSA 3.29 06/06/2024    PSA 2.80 06/05/2023    PSA 2.2 07/05/2022     Lab Results   Component Value Date    CALCIUM 9.3 12/06/2024    K 3.4 (L) 12/06/2024    CO2 28 12/06/2024     12/06/2024    BUN 17 12/06/2024    CREATININE 0.98 12/06/2024     Lab Results   Component Value Date    WBC 8.78 10/24/2024    HGB 14.9 10/24/2024    HCT 42.8 10/24/2024    MCV 90 10/24/2024     10/24/2024       Office Urine Dip  Recent Results (from the past hour)   POCT Measure PVR    Collection Time: 04/11/25  9:42 AM   Result Value Ref Range    POST-VOID RESIDUAL VOLUME, ML POC 23 mL

## 2025-04-11 NOTE — ASSESSMENT & PLAN NOTE
He feels he is voiding better on combined medical therapy. He will remain on tamsulosin and finasteride.  AUA SS is now 6. PVR excellent at 23cc.  We will continue present therapy.  Orders:    POCT Measure PVR    tamsulosin (FLOMAX) 0.4 mg; Take 1 capsule (0.4 mg total) by mouth daily with dinner

## 2025-04-11 NOTE — PATIENT INSTRUCTIONS
"  Patient Education     Kidney stones in adults   The Basics   Written by the doctors and editors at [facility]   What are kidney stones? -- Kidney stones are just what they sound like: small stones that form inside the kidneys. They form when salts and minerals that are normally in urine build up and harden.  Kidney stones usually get carried out of the body when you urinate. But sometimes, they can get stuck on the way out (figure 1). If that happens, the stones can cause:   Pain in your side or in the lower part of your belly   Blood in your urine (which can make urine pink or red)   Nausea or vomiting   Pain when you urinate   Needing to urinate in a hurry  How do I know if I have kidney stones? -- If your doctor or nurse thinks that you have kidney stones, they can order an imaging test that can show the stones. (Imaging tests create pictures of the inside of the body.)  How are kidney stones treated? -- Each person's treatment is a little different. The right treatment for you will depend on:   The size, type, and location of your stone   How much pain you have   How much you are vomiting  If your stone is small and causes only mild symptoms, you might be able to stay home and wait for it to pass in your urine. If you are going to try this, your doctor will tell you what to do. This usually includes:   Drinking lots of fluids   Taking pain medicines or medicines that make it easier to pass the stone   Urinating through a strainer so you can catch the stone when it comes out  If your stone is big or causes severe symptoms, you might need treatment in the hospital. Kidney stones that do not pass on their own can be treated with:   \"Shock wave lithotripsy\" - A machine uses sound waves to break up stones into smaller pieces. This procedure does not involve surgery, but it can be painful.   \"Percutaneous nephrolithotomy\" - This is a special kind of surgery in which a doctor makes very small holes in your skin. The " "doctor passes tiny tools through the holes and into your kidney. Then, they remove the stone.   \"Ureteroscopy\" - A doctor puts a thin tube into your body the same way urine comes out. They use tools at the end of the tube to break up or remove stones.  What problems should I watch for? -- If you are trying to pass a kidney stone at home, call your doctor or nurse for advice if:   You do not urinate for more than 8 hours.   You have a fever of 100.4°F (38°C) or higher, or chills.   Your urine is cloudy, smells bad, or has more blood in it than before.   The pain from your kidney stone gets very bad, and taking pain medicine doesn't help.   You are vomiting and can't keep liquids down.   Your pain does not go away after 1 to 2 weeks  What can I do to prevent getting kidney stones again? -- Drink plenty of water. You might also need to change what you eat, depending on what your kidney stones were made of. If so, your doctor or nurse can tell you which foods to avoid. Your doctor or nurse might also prescribe you new medicines to keep you from having another kidney stone.  All topics are updated as new evidence becomes available and our peer review process is complete.  This topic retrieved from [employer] on: May 09, 2024.  Topic 62282 Version 19.0  Release: 32.4.3 - C32.128  © 2024 UpToDate, Inc. and/or its affiliates. All rights reserved.  figure 1: Anatomy of the urinary tract     Urine is made by the kidneys. It passes from the kidneys into the bladder through 2 tubes called the ureters. Then, it leaves the bladder through another tube called the urethra.  Graphic 69606 Version 8.0  Consumer Information Use and Disclaimer   Disclaimer: This generalized information is a limited summary of diagnosis, treatment, and/or medication information. It is not meant to be comprehensive and should be used as a tool to help the user understand and/or assess potential diagnostic and treatment options. It does NOT include all " information about conditions, treatments, medications, side effects, or risks that may apply to a specific patient. It is not intended to be medical advice or a substitute for the medical advice, diagnosis, or treatment of a health care provider based on the health care provider's examination and assessment of a patient's specific and unique circumstances. Patients must speak with a health care provider for complete information about their health, medical questions, and treatment options, including any risks or benefits regarding use of medications. This information does not endorse any treatments or medications as safe, effective, or approved for treating a specific patient. UpToDate, Inc. and its affiliates disclaim any warranty or liability relating to this information or the use thereof.The use of this information is governed by the Terms of Use, available at https://www.[employer].com/en/know/clinical-effectiveness-terms. 2024© UpToDate, Inc. and its affiliates and/or licensors. All rights reserved.  Copyright   © 2024 UpToDate, Inc. and/or its affiliates. All rights reserved.

## 2025-04-25 ENCOUNTER — TELEPHONE (OUTPATIENT)
Age: 75
End: 2025-04-25

## 2025-04-25 NOTE — TELEPHONE ENCOUNTER
Call placed to pt and spoke with him.   Pt states that he had a personal favor to ask Dr. Banks.   His grandson has been dealing with some urological issues and would like to know if he would see him.     Advised pt to please have his grandson call the office and the RN can help triage his symptoms and speak with MD to see if appointment is needed.     He was thankful for the information and will have his grandson call next week to further evaluate.

## 2025-04-25 NOTE — TELEPHONE ENCOUNTER
Pt under care of:   Office Location: [place]    Insurance   Current Insurance? [employer]    Insurance E-verified? Yes    History  Last Seen (include Follow Up recommendations of last visit- see Office Visit - Instructions): 4/11/2025 - Return in about 1 year (around 4/11/2026) for annual recheck, Follow-up visit with Advanced Practice Provider.    Pt calling to request a direct call back from , he stated he has been a pt of his for the past 37 years and has a few questions he would like to ask him directly.     Call back: [phone]

## (undated) DEVICE — BAG URINE DRAINAGE LEG

## (undated) DEVICE — CATH FOLEY 22FR 5ML 2 WAY SILICONE ELASTIMER

## (undated) DEVICE — BAG URINE DRAINAGE 2000ML ANTI RFLX LF

## (undated) DEVICE — UROLOGIC DRAIN BAG: Brand: UNBRANDED

## (undated) DEVICE — INVIEW CLEAR LEGGINGS: Brand: CONVERTORS

## (undated) DEVICE — 3M™ STERI-STRIP™ COMPOUND BENZOIN TINCTURE 40 BAGS/CARTON 4 CARTONS/CASE C1544: Brand: 3M™ STERI-STRIP™

## (undated) DEVICE — GUIDEWIRE STRGHT TIP 0.035 IN  SOLO PLUS

## (undated) DEVICE — FIBER STD QUANTA 365 MICRON

## (undated) DEVICE — CATH URETERAL 5FR X 70 CM FLEX TIP POLYUR BARD

## (undated) DEVICE — PACK TUR

## (undated) DEVICE — SINGLE PORT MANIFOLD: Brand: NEPTUNE 2

## (undated) DEVICE — ENDOSCOPIC VALVE WITH ADAPTER.: Brand: SURSEAL® II

## (undated) DEVICE — 3M™ TEGADERM™ TRANSPARENT FILM DRESSING FRAME STYLE, 1624W, 2-3/8 IN X 2-3/4 IN (6 CM X 7 CM), 100/CT 4CT/CASE: Brand: 3M™ TEGADERM™

## (undated) DEVICE — LASER FIBER HOLMIUM 272MICRON

## (undated) DEVICE — Device

## (undated) DEVICE — GLOVE SRG BIOGEL 7.5

## (undated) DEVICE — TUBING SUCTION 5MM X 12 FT

## (undated) DEVICE — SPECIMEN CONTAINER STERILE PEEL PACK

## (undated) DEVICE — SCD SEQUENTIAL COMPRESSION COMFORT SLEEVE MEDIUM KNEE LENGTH: Brand: KENDALL SCD

## (undated) DEVICE — SHEATH URETERAL ACCESS 10/12FR 45CM PROXIS

## (undated) DEVICE — GLOVE INDICATOR PI UNDERGLOVE SZ 8 BLUE

## (undated) DEVICE — UROCATCH BAG